# Patient Record
Sex: FEMALE | Race: WHITE | NOT HISPANIC OR LATINO | Employment: FULL TIME | URBAN - METROPOLITAN AREA
[De-identification: names, ages, dates, MRNs, and addresses within clinical notes are randomized per-mention and may not be internally consistent; named-entity substitution may affect disease eponyms.]

---

## 2023-04-18 LAB
ESTRADIOL (PG/ML) IN SER/PLAS: NORMAL
THYROTROPIN (MIU/L) IN SER/PLAS BY DETECTION LIMIT <= 0.05 MIU/L: NORMAL

## 2023-04-24 LAB
ESTRADIOL (PG/ML) IN SER/PLAS: 86 PG/ML
THYROTROPIN (MIU/L) IN SER/PLAS BY DETECTION LIMIT <= 0.05 MIU/L: 1.05 MIU/L (ref 0.44–3.98)

## 2023-05-15 ENCOUNTER — TELEPHONE (OUTPATIENT)
Dept: PRIMARY CARE | Facility: CLINIC | Age: 43
End: 2023-05-15
Payer: COMMERCIAL

## 2023-05-15 DIAGNOSIS — R39.9 UTI SYMPTOMS: Primary | ICD-10-CM

## 2023-05-15 NOTE — TELEPHONE ENCOUNTER
Patient has symptoms of a UTI and would like an order for a urinalysis. Please advise at home number.

## 2023-05-18 ENCOUNTER — LAB (OUTPATIENT)
Dept: LAB | Facility: LAB | Age: 43
End: 2023-05-18
Payer: COMMERCIAL

## 2023-05-18 DIAGNOSIS — R39.9 UTI SYMPTOMS: ICD-10-CM

## 2023-05-18 LAB
APPEARANCE, URINE: ABNORMAL
BACTERIA, URINE: ABNORMAL /HPF
BILIRUBIN, URINE: NEGATIVE
BLOOD, URINE: NEGATIVE
COLOR, URINE: ABNORMAL
GLUCOSE, URINE: NEGATIVE MG/DL
KETONES, URINE: NEGATIVE MG/DL
LEUKOCYTE ESTERASE, URINE: ABNORMAL
NITRITE, URINE: NEGATIVE
PH, URINE: 6 (ref 5–8)
PROTEIN, URINE: NEGATIVE MG/DL
RBC, URINE: 1 /HPF (ref 0–5)
SPECIFIC GRAVITY, URINE: 1 (ref 1–1.03)
SQUAMOUS EPITHELIAL CELLS, URINE: 2 /HPF
UROBILINOGEN, URINE: <2 MG/DL (ref 0–1.9)
WBC, URINE: 12 /HPF (ref 0–5)

## 2023-05-18 PROCEDURE — 87086 URINE CULTURE/COLONY COUNT: CPT

## 2023-05-18 PROCEDURE — 81001 URINALYSIS AUTO W/SCOPE: CPT

## 2023-05-19 LAB — URINE CULTURE: NORMAL

## 2023-05-22 ENCOUNTER — TELEPHONE (OUTPATIENT)
Dept: PRIMARY CARE | Facility: CLINIC | Age: 43
End: 2023-05-22
Payer: COMMERCIAL

## 2023-05-22 DIAGNOSIS — N39.0 URINARY TRACT INFECTION WITHOUT HEMATURIA, SITE UNSPECIFIED: Primary | ICD-10-CM

## 2023-05-22 NOTE — TELEPHONE ENCOUNTER
----- Message from Dougie Hartley DO sent at 5/21/2023  5:33 PM EDT -----  Can you call patient to see if still having UTI symptoms? I can send in an antibiotic if still symptomatic. Urinalysis mildly suggestive of UTI but urine culture has not grown anything.

## 2023-05-22 NOTE — TELEPHONE ENCOUNTER
Pt has questions on her urinary test results with the white blood count and wanted to know if she should be concerned. Please call

## 2023-06-21 PROBLEM — M54.12 CERVICAL RADICULOPATHY, ACUTE: Status: ACTIVE | Noted: 2023-06-21

## 2023-06-21 PROBLEM — R20.2 HAND TINGLING: Status: ACTIVE | Noted: 2023-06-21

## 2023-06-21 PROBLEM — R20.9 SKIN SENSATION DISTURBANCE: Status: ACTIVE | Noted: 2023-03-16

## 2023-06-21 PROBLEM — M54.50 LOW BACK PAIN: Status: ACTIVE | Noted: 2023-06-21

## 2023-06-21 PROBLEM — R20.2 TINGLING OF BOTH FEET: Status: ACTIVE | Noted: 2023-06-21

## 2023-06-21 PROBLEM — K59.89 OTHER SPECIFIED FUNCTIONAL INTESTINAL DISORDERS: Status: ACTIVE | Noted: 2023-03-29

## 2023-06-21 PROBLEM — R20.2 PARESTHESIA: Status: ACTIVE | Noted: 2023-03-29

## 2023-06-21 PROBLEM — M54.2 NECK PAIN: Status: ACTIVE | Noted: 2023-03-16

## 2023-06-21 PROBLEM — R76.8 ANA POSITIVE: Status: ACTIVE | Noted: 2023-03-29

## 2023-06-21 PROBLEM — E03.8 SUBCLINICAL HYPOTHYROIDISM: Status: ACTIVE | Noted: 2023-06-21

## 2023-06-21 PROBLEM — E78.5 HYPERLIPIDEMIA: Status: ACTIVE | Noted: 2023-06-21

## 2023-06-21 PROBLEM — E28.319 EARLY MENOPAUSE: Status: ACTIVE | Noted: 2023-06-21

## 2023-06-21 PROBLEM — E28.39 PREMATURE OVARIAN FAILURE: Status: ACTIVE | Noted: 2023-03-29

## 2023-06-21 PROBLEM — Z98.890 HISTORY OF LOOP ELECTRICAL EXCISION PROCEDURE (LEEP): Status: ACTIVE | Noted: 2017-07-27

## 2023-06-21 PROBLEM — F54 PSYCHOLOGICAL AND BEHAVIORAL FACTORS ASSOCIATED WITH DISORDERS OR DISEASES CLASSIFIED ELSEWHERE: Status: ACTIVE | Noted: 2023-06-21

## 2023-06-21 PROBLEM — R92.8 ABNORMAL MAMMOGRAM: Status: ACTIVE | Noted: 2023-06-21

## 2023-06-21 PROBLEM — F41.9 ANXIETY: Status: ACTIVE | Noted: 2023-06-21

## 2023-06-21 PROBLEM — M54.17 LUMBOSACRAL RADICULITIS: Status: ACTIVE | Noted: 2023-06-21

## 2023-06-21 PROBLEM — R39.15 URINARY URGENCY: Status: ACTIVE | Noted: 2023-06-21

## 2023-06-23 ENCOUNTER — OFFICE VISIT (OUTPATIENT)
Dept: PRIMARY CARE | Facility: CLINIC | Age: 43
End: 2023-06-23
Payer: COMMERCIAL

## 2023-06-23 VITALS
HEART RATE: 98 BPM | BODY MASS INDEX: 26.56 KG/M2 | OXYGEN SATURATION: 96 % | WEIGHT: 155.6 LBS | SYSTOLIC BLOOD PRESSURE: 126 MMHG | DIASTOLIC BLOOD PRESSURE: 82 MMHG | TEMPERATURE: 98.9 F | HEIGHT: 64 IN

## 2023-06-23 DIAGNOSIS — F40.243 ANXIETY WITH FLYING: Primary | ICD-10-CM

## 2023-06-23 PROBLEM — R20.2 TINGLING IN EXTREMITIES: Status: ACTIVE | Noted: 2023-03-29

## 2023-06-23 PROCEDURE — 1036F TOBACCO NON-USER: CPT | Performed by: NURSE PRACTITIONER

## 2023-06-23 PROCEDURE — 99213 OFFICE O/P EST LOW 20 MIN: CPT | Performed by: NURSE PRACTITIONER

## 2023-06-23 RX ORDER — ESTRADIOL 0.1 MG/D
1 FILM, EXTENDED RELEASE TRANSDERMAL 2 TIMES WEEKLY
COMMUNITY
End: 2023-09-08 | Stop reason: ALTCHOICE

## 2023-06-23 RX ORDER — LORAZEPAM 1 MG/1
1 TABLET ORAL AS NEEDED
COMMUNITY
Start: 2022-01-11 | End: 2023-06-23 | Stop reason: ALTCHOICE

## 2023-06-23 RX ORDER — VIT C/E/ZN/COPPR/LUTEIN/ZEAXAN 250MG-90MG
25 CAPSULE ORAL DAILY
COMMUNITY
Start: 2022-04-07

## 2023-06-23 RX ORDER — LEVOTHYROXINE SODIUM 50 UG/1
50 TABLET ORAL DAILY
COMMUNITY
End: 2023-10-11 | Stop reason: SDUPTHER

## 2023-06-23 RX ORDER — PROGESTERONE 200 MG/1
200 CAPSULE ORAL
COMMUNITY

## 2023-06-23 RX ORDER — LORAZEPAM 0.5 MG/1
0.5 TABLET ORAL AS NEEDED
Qty: 6 TABLET | Refills: 0 | Status: SHIPPED | OUTPATIENT
Start: 2023-06-23 | End: 2023-09-01 | Stop reason: SDUPTHER

## 2023-06-23 ASSESSMENT — PATIENT HEALTH QUESTIONNAIRE - PHQ9
SUM OF ALL RESPONSES TO PHQ9 QUESTIONS 1 AND 2: 0
2. FEELING DOWN, DEPRESSED OR HOPELESS: NOT AT ALL
1. LITTLE INTEREST OR PLEASURE IN DOING THINGS: NOT AT ALL

## 2023-06-23 NOTE — PROGRESS NOTES
"Subjective   Patient ID: Jocelyn Dee is a 43 y.o. female who presents for Follow up on medication for flying .    Fear of flying. Has a flight coming up on Monday.  Has used ativan in the past. Usually 1 mg for panic attacks. Has not required in the past year.           Review of Systems  otherwise negative aside from what was mentioned above in HPI.    Objective   /82 (BP Location: Right arm, Patient Position: Sitting, BP Cuff Size: Large adult)   Pulse 98   Temp 37.2 °C (98.9 °F) (Temporal)   Ht 1.626 m (5' 4\")   Wt 70.6 kg (155 lb 9.6 oz)   SpO2 96%   BMI 26.71 kg/m²     Physical Exam  Constitutional:       Appearance: Normal appearance.   Cardiovascular:      Rate and Rhythm: Normal rate.   Pulmonary:      Effort: Pulmonary effort is normal.   Abdominal:      General: Abdomen is flat.   Neurological:      General: No focal deficit present.      Mental Status: She is alert and oriented to person, place, and time. Mental status is at baseline.   Psychiatric:         Mood and Affect: Mood normal.         Behavior: Behavior normal.         Thought Content: Thought content normal.         Judgment: Judgment normal.         Assessment/Plan   Problem List Items Addressed This Visit    None  Visit Diagnoses       Anxiety with flying    -  Primary    Relevant Medications    LORazepam (Ativan) 0.5 mg tablet        Discussed use 30 mins prior to flight.       "

## 2023-07-17 LAB
ABO GROUP (TYPE) IN BLOOD: NORMAL
ANTIBODY SCREEN: NORMAL
RH FACTOR: NORMAL
SYPHILIS TOTAL AB: NONREACTIVE

## 2023-07-18 LAB
CHLAMYDIA TRACH., AMPLIFIED: NEGATIVE
HEPATITIS B VIRUS SURFACE AG PRESENCE IN SERUM: NONREACTIVE
HEPATITIS C VIRUS AB PRESENCE IN SERUM: NONREACTIVE
HIV 1/ 2 AG/AB SCREEN: NONREACTIVE
N. GONORRHEA, AMPLIFIED: NEGATIVE

## 2023-08-25 LAB
ESTRADIOL (PG/ML) IN SER/PLAS: 560 PG/ML
PROGESTERONE (NG/ML) IN SER/PLAS: 0.3 NG/ML

## 2023-08-28 LAB
ESTRADIOL (PG/ML) IN SER/PLAS: 506 PG/ML
PROGESTERONE (NG/ML) IN SER/PLAS: 0.2 NG/ML

## 2023-09-01 ENCOUNTER — TELEPHONE (OUTPATIENT)
Dept: PRIMARY CARE | Facility: CLINIC | Age: 43
End: 2023-09-01
Payer: COMMERCIAL

## 2023-09-01 DIAGNOSIS — F40.243 ANXIETY WITH FLYING: ICD-10-CM

## 2023-09-01 LAB
ESTRADIOL (PG/ML) IN SER/PLAS: 296 PG/ML
PROGESTERONE (NG/ML) IN SER/PLAS: 22.4 NG/ML

## 2023-09-01 RX ORDER — LORAZEPAM 0.5 MG/1
0.5 TABLET ORAL AS NEEDED
Qty: 6 TABLET | Refills: 0 | Status: SHIPPED | OUTPATIENT
Start: 2023-09-01 | End: 2024-05-06 | Stop reason: SDUPTHER

## 2023-09-01 NOTE — TELEPHONE ENCOUNTER
PT SET UP AN APPT ON 9/8/23 WITH DR RIVERA TO SEE HER SINCE THERE WAS AN OPENING. JUST AN FYI TO ADD TO THIS MSG.

## 2023-09-01 NOTE — TELEPHONE ENCOUNTER
PT OF DR RIVERA'S.   ATIVAN 10MG FOR AN UPCOMING FLIGHT FOR HER ANXIETY.  USING [facility] ON [facility] IN [place]   PT FLYING ON 9/12/23.  PLEASE ADVISE PT DOESN'T KNOW IF SHE IS DUE FOR AN APT OR NOT. SHE HAD LAST OFFICE VISIT ON 6/23/23 WITH CHHAYA.

## 2023-09-05 LAB — PROGESTERONE (NG/ML) IN SER/PLAS: 39.1 NG/ML

## 2023-09-08 ENCOUNTER — TELEMEDICINE (OUTPATIENT)
Dept: PRIMARY CARE | Facility: CLINIC | Age: 43
End: 2023-09-08
Payer: COMMERCIAL

## 2023-09-08 ENCOUNTER — TELEPHONE (OUTPATIENT)
Dept: PRIMARY CARE | Facility: CLINIC | Age: 43
End: 2023-09-08

## 2023-09-08 VITALS — WEIGHT: 150 LBS | BODY MASS INDEX: 25.75 KG/M2

## 2023-09-08 DIAGNOSIS — F41.9 ANXIETY: Primary | ICD-10-CM

## 2023-09-08 PROCEDURE — 99212 OFFICE O/P EST SF 10 MIN: CPT | Performed by: INTERNAL MEDICINE

## 2023-09-08 ASSESSMENT — PATIENT HEALTH QUESTIONNAIRE - PHQ9
1. LITTLE INTEREST OR PLEASURE IN DOING THINGS: NOT AT ALL
2. FEELING DOWN, DEPRESSED OR HOPELESS: NOT AT ALL
SUM OF ALL RESPONSES TO PHQ9 QUESTIONS 1 AND 2: 0

## 2023-09-08 NOTE — TELEPHONE ENCOUNTER
Pt of Dr Addison's. She missed her OV today in regards to filling a script for Ativan for her anxiety on flights. She is going to be gone 9/12-9/16 will be back for that short period of time and then gone again 9/19-9/21. Please advise if you would like pt to be added on. Thank you!

## 2023-09-08 NOTE — PROGRESS NOTES
"Subjective   Patient ID: Jocelyn Dee is a 43 y.o. female who presents for Ativan renewal .    Pt had emryo transfer on Tuesday and will be traveling on 9/12 for 1.5 hours,she recently had IVF and asking if she can take her Ativan,she feels nervous with traveling. I told her that she should not take it while pregnant(category D).  She will check with her OB and ask if she can take benadryl,It can also make her sleepy and \"little relaxed\",or do deep breathing and relaxation technics during the flight.         Review of Systems   Genitourinary:         As HPI.   Psychiatric/Behavioral:          As HPI.       Objective   Wt 68 kg (150 lb)   BMI 25.75 kg/m²     Physical Exam    Assessment/Plan   Problem List Items Addressed This Visit       Anxiety - Primary     It is unsafe to take Ativan during pregnancy.  Can take benadryl,but check with your OB.  Return for follow up on general medical condition.             Virtual or Telephone Consent    An interactive audio and video telecommunication system which permits real time communications between the patient (at the originating site) and provider (at the distant site) was utilized to provide this telehealth service.   Verbal consent was requested and obtained from Jocelyn Dee on this date, 09/08/23 for a telehealth visit.    "

## 2023-09-08 NOTE — ASSESSMENT & PLAN NOTE
It is unsafe to take Ativan during pregnancy.  Can take benadryl,but check with your OB.  Return for follow up on general medical condition.

## 2023-09-15 LAB — CHORIOGONADOTROPIN (MIU/ML) IN SER/PLAS: <2 MIU/ML

## 2023-09-26 ENCOUNTER — OFFICE VISIT (OUTPATIENT)
Dept: PRIMARY CARE | Facility: CLINIC | Age: 43
End: 2023-09-26
Payer: COMMERCIAL

## 2023-09-26 VITALS
DIASTOLIC BLOOD PRESSURE: 76 MMHG | HEIGHT: 64 IN | SYSTOLIC BLOOD PRESSURE: 124 MMHG | TEMPERATURE: 97.8 F | BODY MASS INDEX: 26.5 KG/M2 | OXYGEN SATURATION: 97 % | HEART RATE: 101 BPM | WEIGHT: 155.2 LBS

## 2023-09-26 DIAGNOSIS — E78.49 OTHER HYPERLIPIDEMIA: ICD-10-CM

## 2023-09-26 DIAGNOSIS — M54.12 CERVICAL RADICULOPATHY, ACUTE: ICD-10-CM

## 2023-09-26 DIAGNOSIS — F41.9 ANXIETY: Primary | ICD-10-CM

## 2023-09-26 PROCEDURE — 1036F TOBACCO NON-USER: CPT | Performed by: INTERNAL MEDICINE

## 2023-09-26 PROCEDURE — 99214 OFFICE O/P EST MOD 30 MIN: CPT | Performed by: INTERNAL MEDICINE

## 2023-09-26 RX ORDER — IVERMECTIN 10 MG/G
1 CREAM TOPICAL DAILY
COMMUNITY
Start: 2023-09-06

## 2023-09-26 RX ORDER — HYDROXYZINE HYDROCHLORIDE 25 MG/1
25 TABLET, FILM COATED ORAL 2 TIMES DAILY
Qty: 60 TABLET | Refills: 0 | Status: SHIPPED | OUTPATIENT
Start: 2023-09-26 | End: 2023-10-25 | Stop reason: SDUPTHER

## 2023-09-26 RX ORDER — NORGESTIMATE AND ETHINYL ESTRADIOL 0.25-0.035
1 KIT ORAL DAILY
COMMUNITY
Start: 2023-09-16 | End: 2023-11-08 | Stop reason: ALTCHOICE

## 2023-09-26 ASSESSMENT — PATIENT HEALTH QUESTIONNAIRE - PHQ9
1. LITTLE INTEREST OR PLEASURE IN DOING THINGS: NOT AT ALL
2. FEELING DOWN, DEPRESSED OR HOPELESS: SEVERAL DAYS
10. IF YOU CHECKED OFF ANY PROBLEMS, HOW DIFFICULT HAVE THESE PROBLEMS MADE IT FOR YOU TO DO YOUR WORK, TAKE CARE OF THINGS AT HOME, OR GET ALONG WITH OTHER PEOPLE: NOT DIFFICULT AT ALL
SUM OF ALL RESPONSES TO PHQ9 QUESTIONS 1 AND 2: 1

## 2023-09-26 NOTE — PROGRESS NOTES
"Subjective   Patient ID: Jocelyn Dee is a 43 y.o. female who presents for Follow-up.    Here to follow up on tingling legs,was seen by neurologist Dr Virgen and , Dr Dong and another one at [facility] and an I.D,had work up.records reviewed.  Pt has been seen at fertility clinic and has been feeling overwhelmed,has an embryo,has spent lots of money but unsure if she is ready now to attempt to get pregnant.  Has been anxious,was started on OC,has been very emotional.  She has been working at [employer],working 4 hours per day because unable to stand up for 8 hours.  She c/o neck pain,tingling of left arm and left sciatica.she will be going for PT and massage therapy.         Review of Systems   Neurological:         Tingling left arm and legs.       Objective   /76 (BP Location: Left arm, Patient Position: Sitting, BP Cuff Size: Adult)   Pulse 101   Temp 36.6 °C (97.8 °F) (Temporal)   Ht 1.626 m (5' 4\")   Wt 70.4 kg (155 lb 3.2 oz)   SpO2 97%   BMI 26.64 kg/m²     Physical Exam  Constitutional:       Appearance: Normal appearance.   HENT:      Head: Normocephalic and atraumatic.   Eyes:      Extraocular Movements: Extraocular movements intact.      Pupils: Pupils are equal, round, and reactive to light.   Cardiovascular:      Rate and Rhythm: Normal rate and regular rhythm.      Heart sounds: Normal heart sounds.   Pulmonary:      Effort: Pulmonary effort is normal.      Breath sounds: Normal breath sounds. No wheezing or rhonchi.   Abdominal:      General: Abdomen is flat. Bowel sounds are normal. There is no distension.      Palpations: Abdomen is soft.   Musculoskeletal:         General: Normal range of motion.      Cervical back: Normal range of motion and neck supple.      Right lower leg: No edema.      Left lower leg: No edema.   Skin:     General: Skin is warm.   Neurological:      General: No focal deficit present.      Mental Status: She is alert and oriented to person, place, " and time.   Psychiatric:         Mood and Affect: Mood normal.      Comments: Tearful at time.         Assessment/Plan   Problem List Items Addressed This Visit             ICD-10-CM    Anxiety - Primary F41.9    Relevant Medications    hydrOXYzine HCL (Atarax) 25 mg tablet    Cervical radiculopathy, acute M54.12     See PT.         Hyperlipidemia E78.5     Follow low fat diet.

## 2023-10-04 ENCOUNTER — TELEPHONE (OUTPATIENT)
Dept: ENDOCRINOLOGY | Facility: CLINIC | Age: 43
End: 2023-10-04
Payer: COMMERCIAL

## 2023-10-04 DIAGNOSIS — Z01.812 ENCOUNTER FOR PREPROCEDURAL LABORATORY EXAMINATION: ICD-10-CM

## 2023-10-04 DIAGNOSIS — N84.0 ENDOMETRIAL POLYP: Primary | ICD-10-CM

## 2023-10-04 NOTE — TELEPHONE ENCOUNTER
Called patient to review recent hysteroscopy findings  Reviewed with Dr. Chambers and we agreed she should proceed with hysteroscopy with Aveta biopsy vs. Endometrial biopsy with pipelle pending findings  She would like to do this awake to avoid anesthesia  She will stay on OCP until procedure; will have procedure team contact patient for scheduling.  SAMANTA HILLIARD on 10/4/23 at 12:45 PM.     Prior notes reviewed:    Procedure Note Dr. Chambers 9/29/2023  Findings:   Normally shaped cavity with diffusely irregular endometrial lining, no discrete lesions or adhesions  Bilateral normal appearing ostia seen    Patient and images reviewed with Dr. Hilliard, recommend endometrial biopsy with Aveta hysteroscopy/focal sampling. Dr. Hilliard will reach out to these patient regarding findings.    9/28/2023  Called patient to review possible endometrioma seen on US  She does endorse significant dysmenorrhea and nausea    Discussed options for testing and treatment for possible endometriosis  DIscussed laparoscopy, BCL6 biopsy as testing options  Laparoscopy, stopping HRT or adding Lupron/.Aygestin as treatment options    As patient already has POI and has been on HRT, Lupron is likely not necessary for endometriosis suppression  Recommend presumptive treatment with Aygestin 5 mg daily x 1 month  After Aygestin suppression can proceed with another programmed FET    If still no success can consider other diagnostic/treatment options for possible endometriosis.  Samanta Hilliard MD 09/28/2023 16:32     9/19/2023  Called patient following negative hCG in donor egg FET    She did have spotting during the cycle, resolved with P4    Recommend:  1. She is planning to start OCPs, okay to proceed with menses, schedule diagnostic hysteroscopy while on OCPs  2. For next cycle: Programmed FET with estrace patches and IM P4 100 mg (2mL) + vaginal progesterone 200 mg BID - to start morning after IM P4 start  Baby ASA to start with  FET  -transfer 1 blast

## 2023-10-05 ENCOUNTER — PREP FOR PROCEDURE (OUTPATIENT)
Dept: ENDOCRINOLOGY | Facility: CLINIC | Age: 43
End: 2023-10-05
Payer: COMMERCIAL

## 2023-10-05 ENCOUNTER — DOCUMENTATION (OUTPATIENT)
Dept: ENDOCRINOLOGY | Facility: CLINIC | Age: 43
End: 2023-10-05
Payer: COMMERCIAL

## 2023-10-05 DIAGNOSIS — N93.9 ABNORMAL UTERINE BLEEDING: Primary | ICD-10-CM

## 2023-10-05 RX ORDER — KETOROLAC TROMETHAMINE 30 MG/ML
30 INJECTION, SOLUTION INTRAMUSCULAR; INTRAVENOUS ONCE AS NEEDED
Status: CANCELLED | OUTPATIENT
Start: 2023-10-05 | End: 2023-10-10

## 2023-10-05 RX ORDER — ACETAMINOPHEN 325 MG/1
650 TABLET ORAL ONCE AS NEEDED
Status: CANCELLED | OUTPATIENT
Start: 2023-10-05

## 2023-10-06 ENCOUNTER — HOSPITAL ENCOUNTER (OUTPATIENT)
Dept: ENDOCRINOLOGY | Facility: CLINIC | Age: 43
End: 2023-10-06
Payer: COMMERCIAL

## 2023-10-06 ENCOUNTER — APPOINTMENT (OUTPATIENT)
Dept: ENDOCRINOLOGY | Facility: CLINIC | Age: 43
End: 2023-10-06
Payer: COMMERCIAL

## 2023-10-06 VITALS
WEIGHT: 156.09 LBS | RESPIRATION RATE: 17 BRPM | SYSTOLIC BLOOD PRESSURE: 135 MMHG | DIASTOLIC BLOOD PRESSURE: 89 MMHG | HEART RATE: 100 BPM | HEIGHT: 64 IN | OXYGEN SATURATION: 98 % | BODY MASS INDEX: 26.65 KG/M2 | TEMPERATURE: 97.5 F

## 2023-10-06 DIAGNOSIS — Z31.41 FERTILITY TESTING: Primary | ICD-10-CM

## 2023-10-06 DIAGNOSIS — N93.9 ABNORMAL UTERINE BLEEDING: ICD-10-CM

## 2023-10-06 LAB — PREGNANCY TEST URINE, POC: NEGATIVE

## 2023-10-06 PROCEDURE — 88305 TISSUE EXAM BY PATHOLOGIST: CPT | Performed by: PATHOLOGY

## 2023-10-06 PROCEDURE — 88305 TISSUE EXAM BY PATHOLOGIST: CPT | Mod: TC,SUR,CMCLAB | Performed by: STUDENT IN AN ORGANIZED HEALTH CARE EDUCATION/TRAINING PROGRAM

## 2023-10-06 PROCEDURE — 81025 URINE PREGNANCY TEST: CPT | Performed by: STUDENT IN AN ORGANIZED HEALTH CARE EDUCATION/TRAINING PROGRAM

## 2023-10-06 RX ORDER — KETOROLAC TROMETHAMINE 30 MG/ML
30 INJECTION, SOLUTION INTRAMUSCULAR; INTRAVENOUS ONCE AS NEEDED
Status: DISCONTINUED | OUTPATIENT
Start: 2023-10-06 | End: 2023-10-08 | Stop reason: HOSPADM

## 2023-10-06 RX ORDER — ACETAMINOPHEN 325 MG/1
650 TABLET ORAL ONCE AS NEEDED
Status: DISCONTINUED | OUTPATIENT
Start: 2023-10-06 | End: 2023-10-08 | Stop reason: HOSPADM

## 2023-10-06 ASSESSMENT — PAIN - FUNCTIONAL ASSESSMENT
PAIN_FUNCTIONAL_ASSESSMENT: 0-10
PAIN_FUNCTIONAL_ASSESSMENT: 0-10

## 2023-10-06 ASSESSMENT — PAIN SCALES - GENERAL
PAINLEVEL_OUTOF10: 0 - NO PAIN
PAINLEVEL_OUTOF10: 1

## 2023-10-06 NOTE — PROGRESS NOTES
Patient ID: Jocelyn Dee is a 43 y.o. female.    Hysteroscopy dilation and curettage    Date/Time: 10/6/2023 3:19 PM    Performed by: Adriana Johnson MD  Authorized by: Adriana Johnson MD    Consent:     Consent obtained:  Verbal and written    Consent given by:  Patient    Risks, benefits, and alternatives were discussed: yes      Risks discussed:  Bleeding, infection and pain  Universal protocol:     Procedure explained and questions answered to patient or proxy's satisfaction: yes      Relevant documents present and verified: yes      Test results available: yes      Imaging studies available: yes      Required blood products, implants, devices, and special equipment available: yes      Immediately prior to procedure, a time out was called: yes      Patient identity confirmed:  Verbally with patient, arm band and hospital-assigned identification number  Pre-procedure details:     Skin preparation:  Povidone-iodine  Sedation:     Sedation type:  None  Anesthesia:     Anesthesia method:  Local infiltration    Local anesthetic:  Lidocaine 1% w/o epi  Procedure specific details:      Procedure: Hysteroscopic Dilation and Curettage  Preop diagnosis: Fertility testing  Post op diagnosis: Same, sp hysteroscopic dilation and curettage  Attending: MD Trish  Assistant: Fellow    Anesthesia: None   IV: None   EBL: 3 cc  Specimens: None   Complications: None   Risks benefits and alternatives of the procedure explained to the patient and informed consent was obtained. Urine pregnancy test was performed and was negative. Time out was performed. The patient was placed in the dorsal lithotomy position and a sterile speculum was placed in the vagina. The cervix was sterilized with Betadine x3. Paracervical block with lidocaine 1% was administered.   Tenaculum: at 12 o'clock  Dilation: No  The hysteroscope was placed in the cervix and advanced into the uterine cavity. Normal saline was used for distension media. The Luis Eduardoeta  was placed in the uterus. The resector was used to remove the irregular proliferative endometrium. Images were obtained and findings noted as below.   All instruments were then removed. Good hemostasis was noted. Patient tolerated the procedure well returned to the recovery area in stable condition. .   Findings:   Cavity: irregular endometrium, now resected  Ostia: Bilateral tubal ostia visualized  Additional Notes: n/a    Adriana Johnson MD PGY 5  Reproductive Endocrinology and Infertility Fellow                Post-procedure details:     Procedure completion:  Tolerated well, no immediate complications

## 2023-10-09 ENCOUNTER — PHARMACY VISIT (OUTPATIENT)
Dept: PHARMACY | Facility: CLINIC | Age: 43
End: 2023-10-09
Payer: COMMERCIAL

## 2023-10-09 ENCOUNTER — TELEPHONE (OUTPATIENT)
Dept: ENDOCRINOLOGY | Facility: CLINIC | Age: 43
End: 2023-10-09
Payer: COMMERCIAL

## 2023-10-09 NOTE — PROGRESS NOTES
Phone call to patient to schedule hysteroscopic ploypectomy with anesthesia on 10/6/23 at 1030.     Instructed to arrive 15 minutes early with a full bladder, take ibuprofen or tylenol up to hour before procedure.     Questions and concerns addressed.

## 2023-10-09 NOTE — TELEPHONE ENCOUNTER
Returned call to patient regarding OCPs, patient will be on her last OCP tomorrow, plan was to start aygestin x1 month, patient was not sure if she should just switch to aygestin or stop her pills, get a period, and start aygestin with that period. Pt had biopsy done 10/6 and awaiting results. Will confirm with Dr. Chong and let patient know plan once I hear back.    10/09/23 at 5:13 PM - Catie Melchor RN

## 2023-10-09 NOTE — TELEPHONE ENCOUNTER
Patient would like a call back - she's about to run out of birth control and isn't sure if she should start a new pack, get her period, or switch to a different medication

## 2023-10-10 ENCOUNTER — TELEPHONE (OUTPATIENT)
Dept: ENDOCRINOLOGY | Facility: CLINIC | Age: 43
End: 2023-10-10
Payer: COMMERCIAL

## 2023-10-10 NOTE — TELEPHONE ENCOUNTER
Patient returned call, told patient Dr. Chong would like her to stop her birth control, get a period, then start Aygestin. Pt agreeable with plan, will touch base with us towards the end of the 30 days to discuss FET set up. Patient aware last med start date for FET is 11/20 prior to the temporary lab closure in December.    Per Dr. Chong, ok to go directly into FET from Aygestin suppression, will not need to get menses. Pending biopsy results may need to add in other medication if needed.    10/10/23 at 10:21 AM - Catie Melchor RN

## 2023-10-11 DIAGNOSIS — E03.8 SUBCLINICAL HYPOTHYROIDISM: Primary | ICD-10-CM

## 2023-10-11 RX ORDER — LEVOTHYROXINE SODIUM 50 UG/1
50 TABLET ORAL DAILY
Qty: 30 TABLET | Refills: 2 | Status: SHIPPED | OUTPATIENT
Start: 2023-10-11 | End: 2024-01-18 | Stop reason: SDUPTHER

## 2023-10-11 NOTE — TELEPHONE ENCOUNTER
Returned call to patient, needs refill of levothyroxine 50mcg, placed new order to be signed off by provider. Instructed to call if she does not get it by the weekend.    10/11/23 at 11:01 AM - Catie Melchor RN

## 2023-10-13 ENCOUNTER — TELEPHONE (OUTPATIENT)
Dept: ENDOCRINOLOGY | Facility: CLINIC | Age: 43
End: 2023-10-13
Payer: COMMERCIAL

## 2023-10-13 NOTE — TELEPHONE ENCOUNTER
Returned patient's phone call. Patient stated she received a message from the pharmacy stating her Synthroid will not be ready for  until 10/16 and she leaves to go OOT 10/15. Patient advised to call pharmacy and see if they are able to have it ready earlier, or if there is another pharmacy we can send Rx to so patient can have medications on her trip. Patient will call back to confirm.    PEPE LLAMAS on 10/13/23 at 2:10 PM.

## 2023-10-13 NOTE — TELEPHONE ENCOUNTER
Patient would like a call back - Patient's pharmacy won't have her meds ready until 10/16 and she goes out of town on 10/15

## 2023-10-16 NOTE — TELEPHONE ENCOUNTER
Called patient back. Patient notifying team that she will miss one dose of synthroid because she is traveling. Patient also wants to know if her results are back from her biopsy. Patient had hysteroscopic D&C with biopsy done on 10/6 and she also wants to know if she can submerge in water and resume normal activities 10 days post hysteroscopy. Once results are back will forward chart to patient's provider to discuss results.   SATISH PRICE on 10/16/23 at 4:49 PM.      Called patient back because results are still not back. Let patient know that she should get a call once results are in and if she doesn't hear anything by next week she should call back.   SATISH PRICE on 10/18/23 at 4:31 PM.

## 2023-10-19 LAB
LABORATORY COMMENT REPORT: NORMAL
PATH REPORT.FINAL DX SPEC: NORMAL
PATH REPORT.GROSS SPEC: NORMAL
PATH REPORT.RELEVANT HX SPEC: NORMAL
PATH REPORT.TOTAL CANCER: NORMAL

## 2023-10-23 ENCOUNTER — TELEPHONE (OUTPATIENT)
Dept: ENDOCRINOLOGY | Facility: CLINIC | Age: 43
End: 2023-10-23
Payer: COMMERCIAL

## 2023-10-23 NOTE — TELEPHONE ENCOUNTER
Patient is calling to talk to a nurse about her test result   She also wants to check if she can swim  Please call her

## 2023-10-23 NOTE — TELEPHONE ENCOUNTER
Returned call to patient, patient was hoping to discuss results of biopsy with Dr. Chong, briefly spoke with Dr. Johnson last week regarding results since Dr. Chong was out of the office. Patient also asking if she can swim now since she does that for exercise. Biopsy was 10/6- told patient she is ok to swim (pool). Patient requesting call from Dr. Chong to review when she has a few minutes, will send her a message to call patient. Patient currently on Aygestin (will be for 30 days prior t next FET set up).    10/23/23 at 11:36 AM - Catie Melchor RN

## 2023-10-25 DIAGNOSIS — F41.9 ANXIETY: ICD-10-CM

## 2023-10-25 RX ORDER — NORGESTIMATE AND ETHINYL ESTRADIOL 0.25-0.035
1 KIT ORAL DAILY
Qty: 84 TABLET | OUTPATIENT
Start: 2023-10-25

## 2023-10-25 RX ORDER — HYDROXYZINE HYDROCHLORIDE 25 MG/1
25 TABLET, FILM COATED ORAL 2 TIMES DAILY
Qty: 60 TABLET | Refills: 0 | Status: SHIPPED | OUTPATIENT
Start: 2023-10-25 | End: 2023-12-06 | Stop reason: SDUPTHER

## 2023-10-25 NOTE — TELEPHONE ENCOUNTER
Called patient to review recent hysteroscopy/endometrial biopsy results  Normal secretory endometrium, resection of endometrium performed.    Patient currently on Aygestin- will stay on this until FET set up    Plan for next FET:  Recommend:  Stop Aygestin when ready to proceed, do not need to wait for menses to start estrace patches  2. For next cycle: Programmed FET with estrace patches and IM P4 100 mg (2mL) + vaginal progesterone 200 mg BID - to start morning after IM P4 start  Baby ASA to start with FET  -transfer 1 SAMANTA Amos on 10/25/23 at 2:37 PM.

## 2023-10-25 NOTE — TELEPHONE ENCOUNTER
----- Message from Catie Melchor RN sent at 10/23/2023 11:30 AM EDT -----  Regarding: next steps  Hi Jocelyn Conrad knew you were out last week, she briefly spoke to Dr. Johnson while you were gone regarding her results but she was hoping to just chat with you real quick to discuss and what your thoughts were. She is currently on the Aygestin for 1 month prior to another FET, you  mentioned possibly adding something in pending these results. She was just requested a quick call from you when you have a few minutes.    Thank you

## 2023-10-26 ENCOUNTER — DOCUMENTATION (OUTPATIENT)
Dept: ENDOCRINOLOGY | Facility: CLINIC | Age: 43
End: 2023-10-26
Payer: COMMERCIAL

## 2023-10-26 NOTE — PROGRESS NOTES
Called patient to discuss timing of FET after talking to Dr. Chong, wanting to confirm when patient started Aygestin (Dr. Chong wanted at least 1 month), left voicemail letting patient know we would need to start FET meds by 11/20 in order to get in before the end of the year. Told patient to call back tomorrow or next week to touch base.    10/26/23 at 4:55 PM - Catie Melchor RN

## 2023-10-27 ENCOUNTER — TELEPHONE (OUTPATIENT)
Dept: ENDOCRINOLOGY | Facility: CLINIC | Age: 43
End: 2023-10-27
Payer: COMMERCIAL

## 2023-10-27 NOTE — TELEPHONE ENCOUNTER
Returned patient's phone call. Patient started her Aygestin on 10/14, and is aware she needs to be on for at least one month before FET cycle. Patient is going to call for Nica on 11/15 to discuss FET set up and dates, and when to switch over to Estrace patches. Patient is aware last date for FET med starts is on 11/20.    PEPE LLAMAS on 10/27/23 at 4:45 PM.

## 2023-10-31 NOTE — PROGRESS NOTES
[place] Interval FET Checklist    Age: 43 y.o.    Provider: Jaymie Chong MD  Primary RN: Catie Melchor  Reasons for Treatment: POI  Last BMI  10/06/23 : 26.79 kg/m²       Past Medical History:   Diagnosis Date    Papillomavirus as the cause of diseases classified elsewhere     HPV in female    Personal history of other complications of pregnancy, childbirth and the puerperium     History of spontaneous     Personal history of other diseases of the female genital tract     History of infertility    Personal history of other infectious and parasitic diseases     History of infection due to human papilloma virus (HPV)     Ectopic Risk: N    Date Done Consultation Results/Comments   10/25/23 Medication Protocol Programmed FET with estrace patches and IM P4 100 mg (2mL) + vaginal progesterone 200 mg BID - to start morning after IM P4 start  Baby ASA to start with FET   23 FET Treatment Form Enroll in EngagedMD: Yes (Catie Melchor RN)  Received and in chart: Yes (Catie Melchor RN)   23 IVF Consent Form Enroll in EngagedMD: Yes (Catie Melchor RN)  Received and in chart: Yes (Catie Melchor RN)     [place] (in) Form Enroll in EngagedMD: Yes (Catie Melchor RN)  Received and in chart: n/a-  Storage at this time    [employer] Transfer Authorization Form Enroll in EngagedMD: Yes (Catie Melchor RN)  Received and in chart: n/a- old consents    Embryo Ship In N/A    Insurance Coverage: NO   7/15/22 Financial Consult Yes    Genetic Carrier Screening Clearance Yes   N/A MFM Consult Okay to proceed? N/A   N/A Psych Consult Okay to proceed? Yes- when created embryos   N/A Genetics Consult Okay to proceed? N/A    Other    Date Done Female Labs Results/Comments   2023 T&S (Q 1 Year) ABO: A  Rh: POS  Antibody: NEG   2023 Hep B sAg NONREACTIVE   2023 Hep C AB NONREACTIVE   2023 HIV NONREACTIVE   2023 Syphilis NONREACTIVE   2023 GC/CT GC: NEGATIVE  CT: NEGATIVE    7/21/2022 Rubella (Q 5 Years) Equivocal  Booster date: 9/16/22 7/21/2022 Varicella (Q 5 Years) POSITIVE   4/24/2023 TSH 1.05 (Ref range: 0.44 - 3.98 mIU/L)   No results found for requested labs within last 365 days. HgbA1C No results found for requested labs within last 365 days.   10/6/23 Uterine Cavity Eval HSG: N/A    SIS: N/A    Hyster: (10/6/2023) Procedure: Hysteroscopic Dilation and Curettage  Preop diagnosis: Fertility testing  Post op diagnosis: Same, sp hysteroscopic dilation and curettage  Attending: MD Trish  Assistant: Fellow    Anesthesia: None   IV: None   EBL: 3 cc  Specimens: None   Complications: None   Risks benefits and alternatives of the procedure explained to the patient and informed consent was obtained. Urine pregnancy test was performed and was negative. Time out was performed. The patient was placed in the dorsal lithotomy position and a sterile speculum was placed in the vagina. The cervix was sterilized with Betadine x3. Paracervical block with lidocaine 1% was administered.   Tenaculum: at 12 o'clock  Dilation: No  The hysteroscope was placed in the cervix and advanced into the uterine cavity. Normal saline was used for distension media. The Aveta was placed in the uterus. The resector was used to remove the irregular proliferative endometrium. Images were obtained and findings noted as below.   All instruments were then removed. Good hemostasis was noted. Patient tolerated the procedure well returned to the recovery area in stable condition. .   Findings:   Cavity: irregular endometrium, now resected  Ostia: Bilateral tubal ostia visualized  Additional Notes: n/a    Adriana Johnson MD PGY 5  Reproductive Endocrinology and Infertility Fellow              Biopsy results:  Normal secretory endometrium, resection of endometrium performed   6/2021 Pap Smear (Q 5 Years) NEGATIVE FOR INTRAEPITHELIAL LESION OR MALIGNANCY   12/2022 Mammogram ( > 40) (Q 1 Year) NORMAL- repeat 1 yr   Date  Done Miscellaneous Results/Comments    BMI Checklist  BMI > 40 or < 18 Added to chart:   No    >= 45 Checklist  Added to chart:   No   **Does not need to be completed prior to placing on IVF calendar**

## 2023-11-08 ENCOUNTER — OFFICE VISIT (OUTPATIENT)
Dept: PRIMARY CARE | Facility: CLINIC | Age: 43
End: 2023-11-08
Payer: COMMERCIAL

## 2023-11-08 VITALS
TEMPERATURE: 97.7 F | OXYGEN SATURATION: 99 % | SYSTOLIC BLOOD PRESSURE: 127 MMHG | WEIGHT: 158 LBS | HEART RATE: 93 BPM | DIASTOLIC BLOOD PRESSURE: 89 MMHG | BODY MASS INDEX: 27.12 KG/M2

## 2023-11-08 DIAGNOSIS — R05.1 ACUTE COUGH: ICD-10-CM

## 2023-11-08 DIAGNOSIS — J22 ACUTE RESPIRATORY INFECTION: Primary | ICD-10-CM

## 2023-11-08 PROBLEM — R10.2 ACUTE PAIN IN FEMALE PELVIS: Status: ACTIVE | Noted: 2023-11-08

## 2023-11-08 PROBLEM — N97.9 FEMALE INFERTILITY: Status: ACTIVE | Noted: 2023-11-08

## 2023-11-08 PROBLEM — M47.812 CERVICAL SPONDYLOSIS WITHOUT MYELOPATHY: Status: ACTIVE | Noted: 2023-10-03

## 2023-11-08 PROBLEM — N94.6 DYSMENORRHEA: Status: ACTIVE | Noted: 2023-11-08

## 2023-11-08 PROBLEM — M47.817 LUMBOSACRAL SPONDYLOSIS WITHOUT MYELOPATHY: Status: ACTIVE | Noted: 2023-10-03

## 2023-11-08 PROBLEM — N83.8 DIMINISHED OVARIAN RESERVE DUE TO ADVANCED MATERNAL AGE: Status: ACTIVE | Noted: 2023-11-08

## 2023-11-08 PROCEDURE — 87637 SARSCOV2&INF A&B&RSV AMP PRB: CPT

## 2023-11-08 PROCEDURE — 99213 OFFICE O/P EST LOW 20 MIN: CPT | Performed by: INTERNAL MEDICINE

## 2023-11-08 PROCEDURE — 1036F TOBACCO NON-USER: CPT | Performed by: INTERNAL MEDICINE

## 2023-11-08 RX ORDER — DOXYCYCLINE 100 MG/1
100 CAPSULE ORAL 2 TIMES DAILY
Qty: 14 CAPSULE | Refills: 0 | Status: SHIPPED | OUTPATIENT
Start: 2023-11-08 | End: 2023-11-15

## 2023-11-08 RX ORDER — NORETHINDRONE 5 MG/1
5 TABLET ORAL DAILY
COMMUNITY
End: 2024-03-13 | Stop reason: ALTCHOICE

## 2023-11-08 RX ORDER — BENZONATATE 200 MG/1
200 CAPSULE ORAL 3 TIMES DAILY PRN
Qty: 42 CAPSULE | Refills: 0 | Status: SHIPPED | OUTPATIENT
Start: 2023-11-08 | End: 2023-12-08

## 2023-11-08 RX ORDER — FLUTICASONE PROPIONATE 50 MCG
1 SPRAY, SUSPENSION (ML) NASAL DAILY
Qty: 16 G | Refills: 5 | Status: SHIPPED | OUTPATIENT
Start: 2023-11-08 | End: 2024-06-10 | Stop reason: WASHOUT

## 2023-11-08 ASSESSMENT — PATIENT HEALTH QUESTIONNAIRE - PHQ9
2. FEELING DOWN, DEPRESSED OR HOPELESS: NOT AT ALL
1. LITTLE INTEREST OR PLEASURE IN DOING THINGS: NOT AT ALL
SUM OF ALL RESPONSES TO PHQ9 QUESTIONS 1 AND 2: 0

## 2023-11-08 NOTE — PROGRESS NOTES
Subjective   Patient ID: Jocelyn Dee is a 43 y.o. female who presents for URI, COVID negative , Headache, Fever, Low grade fever , Sore Throat, Cough, Green mucus , and Symptoms started Sunday morning .    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         Review of Systems   Constitutional:  Positive for fatigue and fever.   HENT:  Positive for congestion, ear pain, sinus pressure and sore throat.    Eyes:  Negative for discharge and redness.   Respiratory:  Positive for cough. Negative for shortness of breath.    Cardiovascular:  Negative for chest pain.   Gastrointestinal:  Negative for nausea and vomiting.   Neurological:  Positive for headaches.       Objective   /89 (BP Location: Left arm, Patient Position: Sitting, BP Cuff Size: Adult)   Pulse 93   Temp 36.5 °C (97.7 °F) (Temporal)   Wt 71.7 kg (158 lb)   SpO2 99%   BMI 27.12 kg/m²     Physical Exam  Constitutional:       General: She is not in acute distress.     Appearance: Normal appearance.   HENT:      Head: Normocephalic and atraumatic.      Right Ear: Tympanic membrane normal.      Left Ear: Tympanic membrane normal.      Ears:      Comments: Pain over maxillary sinus area with palpation.     Nose: Congestion present.      Mouth/Throat:      Pharynx: Posterior oropharyngeal erythema present. No oropharyngeal exudate.   Eyes:      General: No scleral icterus.     Extraocular Movements: Extraocular movements intact.      Pupils: Pupils are equal, round, and reactive to light.   Cardiovascular:      Rate and Rhythm: Normal rate and regular rhythm.      Heart sounds: Normal heart sounds.   Pulmonary:      Effort: Pulmonary effort is normal.      Breath  sounds: Normal breath sounds. No wheezing or rhonchi.   Abdominal:      General: Abdomen is flat. Bowel sounds are normal. There is no distension.      Palpations: Abdomen is soft.   Musculoskeletal:         General: Normal range of motion.      Cervical back: Normal range of motion and neck supple.      Right lower leg: No edema.      Left lower leg: No edema.   Skin:     General: Skin is warm.   Neurological:      General: No focal deficit present.      Mental Status: She is alert and oriented to person, place, and time.   Psychiatric:         Mood and Affect: Mood normal.         Behavior: Behavior normal.         Assessment/Plan   Problem List Items Addressed This Visit             ICD-10-CM    Acute respiratory infection - Primary J22     Suspect acute sinusitis but we will check for COVID RSV and flu swab was done today.  Rapid strep was negative.  Will treat with Docycycline,Flonase..Benzonates(Pt not pregnant).  Rest,push fluid.  Call in 4 days if not better         Relevant Medications    fluticasone (Flonase) 50 mcg/actuation nasal spray    doxycycline (Vibramycin) 100 mg capsule    Other Relevant Orders    Sars-CoV-2 PCR, Symptomatic (Completed)    RSV PCR (Completed)    Influenza A, and B PCR (Completed)    Acute cough R05.1    Relevant Medications    benzonatate (Tessalon) 200 mg capsule

## 2023-11-09 LAB
FLUAV RNA RESP QL NAA+PROBE: NOT DETECTED
FLUBV RNA RESP QL NAA+PROBE: NOT DETECTED
RSV RNA RESP QL NAA+PROBE: NOT DETECTED
SARS-COV-2 RNA RESP QL NAA+PROBE: NOT DETECTED

## 2023-11-09 ASSESSMENT — ENCOUNTER SYMPTOMS
FATIGUE: 1
EYE REDNESS: 0
SINUS PRESSURE: 1
SHORTNESS OF BREATH: 0
VOMITING: 0
NAUSEA: 0
HEADACHES: 1
COUGH: 1
FEVER: 1
EYE DISCHARGE: 0
SORE THROAT: 1

## 2023-11-09 NOTE — ASSESSMENT & PLAN NOTE
Suspect acute sinusitis but we will check for COVID RSV and flu swab was done today.  Rapid strep was negative.  Will treat with Docycycline,Flonase..Benzonates(Pt not pregnant).  Rest,push fluid.  Call in 4 days if not better

## 2023-11-14 ENCOUNTER — TELEPHONE (OUTPATIENT)
Dept: ENDOCRINOLOGY | Facility: CLINIC | Age: 43
End: 2023-11-14
Payer: COMMERCIAL

## 2023-11-14 NOTE — TELEPHONE ENCOUNTER
Returned patient's phone call. Patient was calling to discuss next steps for IVF cycle with Nica. Told patient Nica will be back in the office tomorrow. Will send RN a message, and patient will call tomorrow to review next steps.    PEPE LLAMAS on 11/14/23 at 12:57 PM.

## 2023-11-15 ENCOUNTER — DOCUMENTATION (OUTPATIENT)
Dept: ENDOCRINOLOGY | Facility: CLINIC | Age: 43
End: 2023-11-15
Payer: COMMERCIAL

## 2023-11-15 NOTE — PROGRESS NOTES
Called patient to discuss cycle after calling the office the other day. Patient currently on Aygestin for about a month now, per Dr. Chong, plan was to go directly into FET set up from last pill (does not need to wait for menses). Patient unsure at this time if she wants to proceed prior to the last start date 11/20, states she has a lot going on with work and getting over being sick and has some stressors going on. Told patient that she can continue this medication and can be on longer than a month if desired. Patient is going to discuss with  and let us know.    Discussed potential dates for FET set up.  If patient wants to proceed prior to  lab closure can stop aygestin 11/19 and start patches 11/20, will plan for FET on 12/13 with Dr. Chong. If patient wants to wait until January for transfer, could start Estrace patches 12/11-12/13 and plan for FET 1/2 or 1/4 with Dr. Chong.  Patient states the Aygestin has been helping with her bowel movements (wondering if she does have endometriosis, told patient it is possible it could be helping with those symptoms).    Patient aware she needs repeat mammogram in December when she is due, if she delays would need to get updated for a January cycle. Pt agreeable. Also aware if she waits until February when she is 44 there is additional testing she would need to complete.    11/15/23 at 11:41 AM - Catie Melchor RN

## 2023-11-20 ENCOUNTER — TELEPHONE (OUTPATIENT)
Dept: ENDOCRINOLOGY | Facility: CLINIC | Age: 43
End: 2023-11-20
Payer: COMMERCIAL

## 2023-11-20 DIAGNOSIS — Z01.812 ENCOUNTER FOR PREPROCEDURAL LABORATORY EXAMINATION: ICD-10-CM

## 2023-11-20 DIAGNOSIS — Z31.83 ENCOUNTER FOR ASSISTED REPRODUCTIVE FERTILITY CYCLE: ICD-10-CM

## 2023-11-20 DIAGNOSIS — N97.9 FEMALE INFERTILITY: ICD-10-CM

## 2023-11-20 NOTE — TELEPHONE ENCOUNTER
Returned call to patient- states she is going to hold off on her transfer until January, will plan to continue Aygestin for now and plan to take last pill 12/12 and start patches 12/13. Plan for FET on 1/4 with Dr. Chong. Can plan lining check 12/26 or 12/27.   Patient will call to touch base with us on 12/12 as a reminder that she is taking her last pill.  Patient is going to send us the medication her rheumatologist may want her to start on (safe in pregnancy but wanted to double check it with Dr. Chong, patient cannot remember the name, will send via [email or web address] and can send to Dr. Chong), states her provider is thinking she may  be having some autoimmune responses.    Patient will need to update mammogram in December.    11/20/23 at 10:20 AM - Catie Melchor RN

## 2023-12-04 ENCOUNTER — TELEPHONE (OUTPATIENT)
Dept: ENDOCRINOLOGY | Facility: CLINIC | Age: 43
End: 2023-12-04
Payer: COMMERCIAL

## 2023-12-04 DIAGNOSIS — Z31.69 ENCOUNTER FOR PRECONCEPTION CONSULTATION: Primary | ICD-10-CM

## 2023-12-04 NOTE — PROGRESS NOTES
Called patient to let her know Dr. Chong's recommendations. Patient stated she has never met with MFM, and does not necessarily want to take this medication. She would be interested in meeting with MFM to discuss. Order placed for referral. Patient is going to decide if she wants to schedule consult.    PEPE LLAMAS on 12/4/23 at 3:16 PM.        MD Pepe Swartz RN  Likely it will be okay for her to start with the following caveats: I would make sure the patient tells her physician that she is planning pregnancy.  Has she seen MFM before?  If not would recommend consult to review.  If she has I can send the MFM a note to confirm that they would be okay with Plaquenil      ----- Message -----  From: Pepe Llamas RN  Sent: 12/4/2023   2:51 PM EST  To: Jaymie Chong MD  Subject: Medication questions                            This patient is calling and wanted to know your recommendation of her being on Plaquenil. She stated it was a possibility to be placed on. She had a +LEELEE, and has lots of aches and pains she said that she could just take tylenol for, but wanted to know your recommendation of this medication with fertility.

## 2023-12-06 ENCOUNTER — SPECIALTY PHARMACY (OUTPATIENT)
Dept: PHARMACY | Facility: CLINIC | Age: 43
End: 2023-12-06

## 2023-12-06 DIAGNOSIS — F41.9 ANXIETY: ICD-10-CM

## 2023-12-06 PROCEDURE — RXMED WILLOW AMBULATORY MEDICATION CHARGE

## 2023-12-06 RX ORDER — HYDROXYZINE HYDROCHLORIDE 25 MG/1
25 TABLET, FILM COATED ORAL 2 TIMES DAILY
Qty: 60 TABLET | Refills: 0 | Status: SHIPPED | OUTPATIENT
Start: 2023-12-06 | End: 2024-02-15 | Stop reason: SDUPTHER

## 2023-12-11 ENCOUNTER — TELEPHONE (OUTPATIENT)
Dept: ENDOCRINOLOGY | Facility: CLINIC | Age: 43
End: 2023-12-11
Payer: COMMERCIAL

## 2023-12-11 ENCOUNTER — PHARMACY VISIT (OUTPATIENT)
Dept: PHARMACY | Facility: CLINIC | Age: 43
End: 2023-12-11
Payer: COMMERCIAL

## 2023-12-11 ENCOUNTER — TELEPHONE (OUTPATIENT)
Dept: ENDOCRINOLOGY | Facility: CLINIC | Age: 43
End: 2023-12-11

## 2023-12-11 NOTE — TELEPHONE ENCOUNTER
For Nica:: pt spoke to you, said the specialty pharmacy was to call her, she cannot get hold of them needs to start her meds tomorrow.

## 2023-12-11 NOTE — TELEPHONE ENCOUNTER
Called patient back, let her know that I reached out to Khadijah at [facility] and she will reach out to her today. Patient was planning on taking last aygestin tomorrow and starting patches Wednesday (will call us to set up cycle), hoping for FET 1/4. Will let me know if she has any issues getting her meds and need to delay cycle at all. Pt aware she does need mammogram this month, has been having issues getting ahold of the , will try again today. Told patient she should try to get in ASAP to have this done prior to transfer, patient agreeable.    12/11/23 at 11:05 AM - Catie Melchor RN      Spoke to patient again- is going to call mammogram office again to see what the hold up is-- patient aware we could just continue on Aygestin for now since we dont even know when she would be scheduled. Pt states her results have always come back very quickly and sometimes they do a follow up ultrasound same time as the mammogram. Told patient we would need to start patches by Friday to do FET on 1/4. Pt really wants to stick with these dates.  Also asking about acupuncture through , see's acupuncturist in [place] but day of transfer would like to see Pavithra Bryant if possible- will send email and inquire but told patient likely not since she is not a patient of her yet and short notice. Pt will update me regarding mammogram.    12/11/23 at 1:21 PM - Catie eMlchor RN

## 2023-12-12 ENCOUNTER — APPOINTMENT (OUTPATIENT)
Dept: MATERNAL FETAL MEDICINE | Facility: CLINIC | Age: 43
End: 2023-12-12
Payer: COMMERCIAL

## 2023-12-13 NOTE — PROGRESS NOTES
"Spoke to pt - she had her mammogram this AM    results in EPIC from today's visit 12/13/2023:  MAMMOGRAM FINDINGS: at [facility] -  \"The breast tissue is heterogeneously dense, which may obscure detection of small masses.    No suspicious masses, calcifications, architectural distortion or other abnormalities are seen in either breast.  The mammogram demonstrates that this patient has dense breasts. An additional screening examination with ABUS (automated breast ultrasound) may be considered to aid in the detection of mammographically occult cancers. ABUS exam should be obtained within 6 months of current screening mammogram unless patient is having ABUS per a high risk based screening protocol.  IMPRESSION:  There is no mammographic evidence of malignancy in either breast.  Routine screening mammogram in 1 year is recommended.\"    PT would like to start patch protocol today; nurse will review BP with Dr Chong; pt is on the calendar for 1/4/23 FET.   Nurse will call pt back to confirm start. Last Aygestin was last night.    12/13/23 at 10:59 AM - Rosie Rivero RN      "

## 2023-12-13 NOTE — PROGRESS NOTES
Boarding Pass Interval FET Checklist     Age: 43 y.o.    Provider: Jaymie Chong MD  Primary RN: Catie Melchor  Reasons for Treatment: POI  Last BMI  10/06/23 : 26.79 kg/m²         Medical History        Past Medical History:   Diagnosis Date    Papillomavirus as the cause of diseases classified elsewhere       HPV in female    Personal history of other complications of pregnancy, childbirth and the puerperium       History of spontaneous     Personal history of other diseases of the female genital tract       History of infertility    Personal history of other infectious and parasitic diseases       History of infection due to human papilloma virus (HPV)         Ectopic Risk: N     Date Done Consultation Results/Comments   10/25/23 Medication Protocol Programmed FET with estrace patches and IM P4 100 mg (2mL) + vaginal progesterone 200 mg BID - to start morning after IM P4 start  Baby ASA to start with FET   23 FET Treatment Form Enroll in EngagedMD: Yes (Catie Melchor RN)  Received and in chart: Yes (Catie Melchor RN)   23 IVF Consent Form Enroll in EngagedMD: Yes (Catie Melchor RN)  Received and in chart: Yes (Catie Melchor RN)      [place] (in) Form Enroll in EngagedMD: Yes (Catie Melchor RN)  Received and in chart: n/a-  Storage at this time     [employer] Transfer Authorization Form Enroll in EngagedMD: Yes (Catie Melchor RN)  Received and in chart: n/a- old consents     Embryo Ship In N/A     Insurance Coverage: NO   7/15/22 Financial Consult Yes     Genetic Carrier Screening Clearance Yes   N/A MFM Consult Okay to proceed? N/A   N/A Psych Consult Okay to proceed? Yes- when created embryos   N/A Genetics Consult Okay to proceed? N/A     Other     Date Done Female Labs Results/Comments   2023 T&S (Q 1 Year) ABO: A  Rh: POS  Antibody: NEG   2023 Hep B sAg NONREACTIVE   2023 Hep C AB NONREACTIVE   2023 HIV NONREACTIVE   2023 Syphilis NONREACTIVE    7/17/2023 GC/CT GC: NEGATIVE  CT: NEGATIVE   7/21/2022 Rubella (Q 5 Years) Equivocal  Booster date: 9/16/22 7/21/2022 Varicella (Q 5 Years) POSITIVE   4/24/2023 TSH 1.05 (Ref range: 0.44 - 3.98 mIU/L)   No results found for requested labs within last 365 days. HgbA1C No results found for requested labs within last 365 days.   10/6/23 Uterine Cavity Eval HSG: N/A     SIS: N/A     Hyster: (10/6/2023) Procedure: Hysteroscopic Dilation and Curettage  Preop diagnosis: Fertility testing  Post op diagnosis: Same, sp hysteroscopic dilation and curettage  Attending: MD Trish  Assistant: Fellow     Anesthesia: None   IV: None   EBL: 3 cc  Specimens: None   Complications: None   Risks benefits and alternatives of the procedure explained to the patient and informed consent was obtained. Urine pregnancy test was performed and was negative. Time out was performed. The patient was placed in the dorsal lithotomy position and a sterile speculum was placed in the vagina. The cervix was sterilized with Betadine x3. Paracervical block with lidocaine 1% was administered.   Tenaculum: at 12 o'clock  Dilation: No  The hysteroscope was placed in the cervix and advanced into the uterine cavity. Normal saline was used for distension media. The Aveta was placed in the uterus. The resector was used to remove the irregular proliferative endometrium. Images were obtained and findings noted as below.   All instruments were then removed. Good hemostasis was noted. Patient tolerated the procedure well returned to the recovery area in stable condition. .   Findings:   Cavity: irregular endometrium, now resected  Ostia: Bilateral tubal ostia visualized  Additional Notes: n/a     Adriana Johnson MD PGY 5  Reproductive Endocrinology and Infertility Fellow                    Biopsy results:  Normal secretory endometrium, resection of endometrium performed   6/2021 Pap Smear (Q 5 Years) NEGATIVE FOR INTRAEPITHELIAL LESION OR MALIGNANCY    12/2023  [facility] Mammogram ( > 40) (Q 1 Year) NORMAL- repeat 1 yr   Date Done Miscellaneous Results/Comments     BMI Checklist  BMI > 40 or < 18 Added to chart:   No     >= 45 Checklist  Added to chart:   No   **Does not need to be completed prior to placing on IVF calendar**  Reviewed and approved by JOHN BRIONES on 12/13/23 at 12:51 PM.

## 2023-12-13 NOTE — PROGRESS NOTES
"Spoke to pt - she had her mammogram this AM    results in EPIC from today's visit 12/13/2023:  MAMMOGRAM FINDINGS: at [facility] -  \"The breast tissue is heterogeneously dense, which may obscure detection of small masses.    No suspicious masses, calcifications, architectural distortion or other abnormalities are seen in either breast.  The mammogram demonstrates that this patient has dense breasts. An additional screening examination with ABUS (automated breast ultrasound) may be considered to aid in the detection of mammographically occult cancers. ABUS exam should be obtained within 6 months of current screening mammogram unless patient is having ABUS per a high risk based screening protocol.  IMPRESSION:  There is no mammographic evidence of malignancy in either breast.  Routine screening mammogram in 1 year is recommended.\"    PT would like to start patch protocol today; nurse will review BP with Dr Chong; pt is on the calendar for 1/4/23 FET.   Nurse will call pt back to confirm start. Last Aygestin was last night.    12/13/23 at 10:59 AM - Rosie Rivero RN    Patient for FET setup.   LMP: N/A (2 months of suppression)  Treatment Plan: For next cycle: Programmed FET with estrace patches and IM P4 100 mg (2mL) + vaginal progesterone 200 mg BID - to start morning after IM P4 start  Baby ASA to start with FET  Tentative lining check: 12/27/23  Tentative progesterone start: 12/30/23  Tentative transfer date: 1/4/24  Number of days of progesterone for transfer: 6 days  Treatment plan: Received by lab  Embryo # confirmed: 8 blastocysts (donor egg)  Embryo # to transfer: 1 blast  Reviewed and approved by JOHN BRIONES on 12/13/23 at 12:56 PM.    Reviewed and ok to proceed. Boarding pass signed.   MD John Espinal MD    TC to pt - reviewed MD plan above; reviewed patch administration and timing; Pt would like to move her IM ROBB sites at lining check - had issues with her sites after last " FET after about 3 weeks of injections, had pain in sciatic area and down her legs. May consider moving sites ventral and doing Z-track. Pt states that she has appt. With MFM on 12/20 for consult.  Pt expresses understanding of plan and is agreeable.    12/13/23 at 1:36 PM - Rosie Rivero RN

## 2023-12-20 ENCOUNTER — INITIAL PRENATAL (OUTPATIENT)
Dept: MATERNAL FETAL MEDICINE | Facility: CLINIC | Age: 43
End: 2023-12-20
Payer: COMMERCIAL

## 2023-12-20 VITALS
WEIGHT: 163.3 LBS | HEIGHT: 64 IN | HEART RATE: 90 BPM | DIASTOLIC BLOOD PRESSURE: 96 MMHG | BODY MASS INDEX: 27.88 KG/M2 | SYSTOLIC BLOOD PRESSURE: 135 MMHG

## 2023-12-20 DIAGNOSIS — Z31.69 ENCOUNTER FOR PRECONCEPTION CONSULTATION: ICD-10-CM

## 2023-12-20 LAB — TSH SERPL-ACNC: 1.54 MIU/L (ref 0.44–3.98)

## 2023-12-20 PROCEDURE — 36415 COLL VENOUS BLD VENIPUNCTURE: CPT | Performed by: STUDENT IN AN ORGANIZED HEALTH CARE EDUCATION/TRAINING PROGRAM

## 2023-12-20 PROCEDURE — 99215 OFFICE O/P EST HI 40 MIN: CPT | Performed by: STUDENT IN AN ORGANIZED HEALTH CARE EDUCATION/TRAINING PROGRAM

## 2023-12-20 PROCEDURE — 84443 ASSAY THYROID STIM HORMONE: CPT | Performed by: STUDENT IN AN ORGANIZED HEALTH CARE EDUCATION/TRAINING PROGRAM

## 2023-12-20 ASSESSMENT — PAIN - FUNCTIONAL ASSESSMENT: PAIN_FUNCTIONAL_ASSESSMENT: 0-10

## 2023-12-20 ASSESSMENT — PAIN SCALES - GENERAL: PAINLEVEL_OUTOF10: 6

## 2023-12-20 NOTE — PROGRESS NOTES
"Jocelyn Dee is a 44 yo  who presents for a preconception consult for advanced maternal age and subclinical hypothyroidism.    Jocelyn is currently being followed by [facility] with a plan for embryo transfer in the future (donor eggs).     Jocelyn is doing ok today. She reports she was diagnosed with subclinical hypothyroidism during her IVF work-up. She is currently taking Synthroid 50 mcg daily.     Jocelyn reports some anxiety due to the her recent failed IVF. She also has joint pain and is following Rheumatology but has no known diagnosis at this time.    PMHX: celiac disease  PSHX: tonsillectomy, hysteroscopy  OBHX: , EAB x1, SAB x1  GYN: denies a history of STIs, reports a hx of a LEEP and cold knife cone  MEDS: Synthroid 50 mcg  ALL: NKDA  SOCIAL: denies tobacco/alcohol/illicit drug use  FAMILY: reviewed and noncontributory     O: BP (!) 135/96 (BP Location: Right arm, Patient Position: Sitting)   Pulse 90   Ht 1.626 m (5' 4\")   Wt 74.1 kg (163 lb 4.8 oz)   BMI 28.03 kg/m²   Gen: NAD  Resp: nonlabored breathing  Cardiac: good peripheral perfusion  Psych: appropriate mood and affect    A/P: Jocelyn Dee is a 44 yo  who presents for a preconception consult for advanced maternal age and subclinical hypothyroidism.    Advanced maternal age: We discussed that there is an increased risk of chromosomal abnormalities for women who are conceiving at advanced maternal age, however, as Jocelyn is using a donor egg, we discussed that the risk of aneuploidy would be that of the age of the egg donor. We discussed screening and diagnostic modalities for chromosomal abnormalities that are available to all pregnant women regardless of age.       Joeclyn understands that the risk of several pregnancy complications such as preeclampsia and gestational diabetes increase with age. We would recommend monitoring for these cardiovascular and metabolic complications and to intervene as obstetrically " indicated. There is also data that suggests women over age 40 have an increased risk of stillbirth compared to a younger obstetric population. We discussed that there is no universal surveillance recommendations, but consideration could be given to obtaining growth ultrasounds at 30 and 36 weeks gestation and to begin weekly  testing at 34 weeks.     Subclinical Hypothyroidism: We discussed that subclinical hypothyroidism has not been associated with adverse effects in pregnancy. Treatment during pregnancy is generally not recommended, however, it is not unreasonable to continue her Synthroid following her IVF cycle.    Joint Pain: Jocelyn reports a history of joint pain of which she follows with Rheumatology. She has not been given a diagnosis but states her Rheumatologist is considering starting her on Plaquenil. We discussed that Plaquenil is considered to be safe in pregnancy and this would be reasonable to start taking.    Anxiety: I have recommended that Jocelyn be seen by Psychiatry. We will help coordinate an appointment for her.    Thank you for the consultation. We would be happy to see Jocelyn again once she becomes pregnant.    Darrin Kumar MD  Maternal-Fetal Medicine

## 2023-12-27 ENCOUNTER — ANCILLARY PROCEDURE (OUTPATIENT)
Dept: ENDOCRINOLOGY | Facility: CLINIC | Age: 43
End: 2023-12-27
Payer: COMMERCIAL

## 2023-12-27 DIAGNOSIS — N97.9 FEMALE INFERTILITY: ICD-10-CM

## 2023-12-27 LAB
ESTRADIOL SERPL-MCNC: 698 PG/ML
PROGEST SERPL-MCNC: 0.3 NG/ML

## 2023-12-27 PROCEDURE — 76857 US EXAM PELVIC LIMITED: CPT | Performed by: OBSTETRICS & GYNECOLOGY

## 2023-12-27 PROCEDURE — 84144 ASSAY OF PROGESTERONE: CPT

## 2023-12-27 PROCEDURE — 76857 US EXAM PELVIC LIMITED: CPT

## 2023-12-27 PROCEDURE — 82670 ASSAY OF TOTAL ESTRADIOL: CPT

## 2023-12-27 PROCEDURE — 36415 COLL VENOUS BLD VENIPUNCTURE: CPT

## 2023-12-27 RX ORDER — ESTRADIOL 0.1 MG/D
FILM, EXTENDED RELEASE TRANSDERMAL
Qty: 8 PATCH | Refills: 3 | Status: SHIPPED | OUTPATIENT
Start: 2023-12-27 | End: 2024-01-05 | Stop reason: SDUPTHER

## 2023-12-27 RX ORDER — PROGESTERONE 200 MG/1
200 CAPSULE ORAL 2 TIMES DAILY
Qty: 60 CAPSULE | Refills: 2 | Status: SHIPPED | OUTPATIENT
Start: 2023-12-27 | End: 2024-03-26

## 2023-12-27 RX ORDER — PROGESTERONE 50 MG/ML
100 INJECTION, SOLUTION INTRAMUSCULAR DAILY
Qty: 50 ML | Refills: 3
Start: 2023-12-27 | End: 2024-03-26

## 2023-12-27 RX ORDER — ESTRADIOL 0.1 MG/D
3 FILM, EXTENDED RELEASE TRANSDERMAL EVERY OTHER DAY
Qty: 48 PATCH | Refills: 2
Start: 2023-12-27 | End: 2024-01-05 | Stop reason: SDUPTHER

## 2023-12-27 NOTE — PROGRESS NOTES
CYCLING NOTE    Here for US and/or lab monitoring; relevant findings reviewed.    Patient stayed for nurse visit. Pain is 0/10 Patient had lining check today, discussed US. Patient will plan on starting progesterone in oil 100mg on 12/30 and will plan on vaginal progesterone 200 mg BID starting 12/31. Tentative transfer date on 1/4/24. Sites marked and ROBB instructions given.   Team will contact patient later today with results and plan.  Component      Latest Ref Rng 12/27/2023   Progesterone      ng/mL 0.3    Estradiol      pg/mL 698          Danielle Young  12/27/2023  8:22 AM    HUDDLE PROVIDER NOTE  Ultrasound and/or labs reviewed at [facility].   Results for orders placed or performed in visit on 12/27/23 (from the past 96 hour(s))   Progesterone   Result Value Ref Range    Progesterone 0.3 ng/mL   Estradiol   Result Value Ref Range    Estradiol 698 pg/mL     Sat 12/30 (Day 18)   progesterone injection: Inject 100 mg once daily into the muscle    Sun 12/31 (Day 19)   progesterone capsule: Insert 200 mg 2 times a day into the vagina   progesterone injection: Inject 100 mg once daily into the muscle    Mon 1/1 (Day 20)   progesterone capsule: Insert 200 mg 2 times a day into the vagina   progesterone injection: Inject 100 mg once daily into the muscle    Tue 1/2 (Day 21)   progesterone capsule: Insert 200 mg 2 times a day into the vagina   progesterone injection: Inject 100 mg once daily into the muscle    Wed 1/3 (Day 22)   progesterone capsule: Insert 200 mg 2 times a day into the vagina   progesterone injection: Inject 100 mg once daily into the muscle    Thu 1/4 (Day 23)   progesterone capsule: Insert 200 mg 2 times a day into the vagina   progesterone injection: Inject 100 mg once daily into the muscle    Continue estrace patch every other day as schedule.    RTC in  1/4/2023  for  Embryo transfer  and Progesterone.   Jaymie Chong  12/27/2023  1:04 PM    Called patient with plan. Patient will await  for a call from the lab for a transfer time and will drink two glasses of water prior to her transfer. Pt to start ROBB on 12/30 and start pv prog on 12/31 bid. Patient agreeable to plan. Patient will start baby asa with transfer.   SATISH PRICE on 12/27/23 at 1:56 PM.

## 2023-12-29 ENCOUNTER — TELEPHONE (OUTPATIENT)
Dept: ENDOCRINOLOGY | Facility: CLINIC | Age: 43
End: 2023-12-29
Payer: COMMERCIAL

## 2023-12-29 NOTE — TELEPHONE ENCOUNTER
Patient is calling to check with a nurse if she can swim if she is on Progesterone vag suppository Rx    She wanted to know if she could still swim while using the vaginal suppositories (yes as long as she is comfortable) and also wanted to know what time the lab would call her re: her FET time - typically in the morning before 9 or 0930. Patient verbalized understanding.   Priscila Mi RN 12/29/23 8:55 AM

## 2024-01-02 ENCOUNTER — TELEPHONE (OUTPATIENT)
Dept: ENDOCRINOLOGY | Facility: CLINIC | Age: 44
End: 2024-01-02
Payer: COMMERCIAL

## 2024-01-02 NOTE — TELEPHONE ENCOUNTER
TC returned to pt. She is on day #4 of ROBB injections and scheduled for FET on 1/4/24. Pt states that she is having numbness at the injection site and down her leg, only on the right side; reports that the left side is doing fine; she did have this problem last cycle, but not until after the injections ended.  Pt will come tomorrow @ 0640 AM for ROBB injection and assessment of sites;  is coming with her.     01/02/24 at 9:58 AM - Rosie Rivero RN

## 2024-01-03 ENCOUNTER — PREP FOR PROCEDURE (OUTPATIENT)
Dept: ENDOCRINOLOGY | Facility: CLINIC | Age: 44
End: 2024-01-03

## 2024-01-03 ENCOUNTER — CLINICAL SUPPORT (OUTPATIENT)
Dept: ENDOCRINOLOGY | Facility: CLINIC | Age: 44
End: 2024-01-03
Payer: COMMERCIAL

## 2024-01-03 RX ORDER — ACETAMINOPHEN 325 MG/1
650 TABLET ORAL ONCE AS NEEDED
Status: CANCELLED | OUTPATIENT
Start: 2024-01-03

## 2024-01-03 NOTE — PROGRESS NOTES
Pt here for ROBB injection site assessment. Pt reports onset of sciatica pain on right side which started after her 2nd injection on that side 2 days ago; states that she felt the pain at the time of the injection and now has pain and numbness down into her right thigh; pt does have a h/o lower back arthritis and the same issue with injections on the left side last cycle (which has now resolved). Injection sites without erythema; right side with small yellowing bruise (has done last two injections on the left side).      ROBB sites remarked with surgical pen; reviewed Z-track with  as option if new sites have same issues; 100 mg ROBB given to pt by this RN today in Left VG site; pt tolerated well (barely felt it).  Will plan tomorrow's injection in Left hip again and RN will review with MD today today and notify pt of any change to plan. Pt advised to call if no improvement in numbness/ pain of right side.    01/03/24 at 7:16 AM - Rosie Rivero RN    Reviewed at noon meeting- having pain and numbness on her right side. Had this on the left last time per RN. Has hx lower back pain and arthritis. Can do injections for a few days on the left side. Can consider ethyl oleate as next step. Can also cancel and try with a natural cycle. Rosie will reach out to discuss further and I can call her if needed.     01/03/24 at 12:40 PM - Catie Lira MD    D/w DR Chong and Pankaj - AMH undetectable, so stimmed FET not likely. Per Dr. Chong, she will review other options for luteal support with pt tomorrow at the FET - she does not want to cancel FET.   No change to plan as RN  left with pt this AM.    01/03/24 at 2:20 PM - Rosie Rivero RN

## 2024-01-04 ENCOUNTER — HOSPITAL ENCOUNTER (OUTPATIENT)
Dept: ENDOCRINOLOGY | Facility: CLINIC | Age: 44
Discharge: HOME | End: 2024-01-04
Payer: COMMERCIAL

## 2024-01-04 DIAGNOSIS — Z31.83 ENCOUNTER FOR ASSISTED REPRODUCTIVE FERTILITY CYCLE: ICD-10-CM

## 2024-01-04 DIAGNOSIS — N97.9 FEMALE INFERTILITY: ICD-10-CM

## 2024-01-04 DIAGNOSIS — Z32.00 UNCONFIRMED PREGNANCY: Primary | ICD-10-CM

## 2024-01-04 LAB — PROGEST SERPL-MCNC: 51 NG/ML

## 2024-01-04 PROCEDURE — 36415 COLL VENOUS BLD VENIPUNCTURE: CPT

## 2024-01-04 PROCEDURE — 58974 EMBRYO TRANSFER INTRAUTERINE: CPT | Performed by: OBSTETRICS & GYNECOLOGY

## 2024-01-04 PROCEDURE — 89255 PREPARE EMBRYO FOR TRANSFER: CPT | Performed by: OBSTETRICS & GYNECOLOGY

## 2024-01-04 PROCEDURE — 89253 EMBRYO HATCHING: CPT | Performed by: OBSTETRICS & GYNECOLOGY

## 2024-01-04 PROCEDURE — 36415 COLL VENOUS BLD VENIPUNCTURE: CPT | Performed by: OBSTETRICS & GYNECOLOGY

## 2024-01-04 PROCEDURE — 89352 THAWING CRYOPRESRVED EMBRYO: CPT | Performed by: OBSTETRICS & GYNECOLOGY

## 2024-01-04 PROCEDURE — 76998 US GUIDE INTRAOP: CPT | Performed by: OBSTETRICS & GYNECOLOGY

## 2024-01-04 PROCEDURE — 84144 ASSAY OF PROGESTERONE: CPT

## 2024-01-04 PROCEDURE — 84144 ASSAY OF PROGESTERONE: CPT | Performed by: OBSTETRICS & GYNECOLOGY

## 2024-01-04 RX ORDER — ACETAMINOPHEN 325 MG/1
650 TABLET ORAL ONCE AS NEEDED
Status: DISCONTINUED | OUTPATIENT
Start: 2024-01-04 | End: 2024-01-05 | Stop reason: HOSPADM

## 2024-01-04 NOTE — PROGRESS NOTES
Patient ID: Jocelyn Dee is a 43 y.o. female.    Embryo Transfer    Date/Time: 1/4/2024 12:20 PM    Performed by: Jaymie Chong MD  Authorized by: Jaymie Chong MD    Consent:     Consent obtained:  Written    Consent given by:  Patient    Procedure risks and benefits discussed: yes      Patient questions answered: yes      Patient agrees, verbalizes understanding, and wants to proceed: yes      Educational handouts given: yes      Instructions and paperwork completed: yes    Procedure:     Pelvic exam performed: No      Cervix cleaned and prepped: Yes      Speculum placed in vagina: Yes      Ultrasound guidance: Yes      Catheter type:  Sure View Balderrama    Uterine position:  Retroverted    Uterine visualization complete: Yes      Difficulty: easy      Estimated blood loss:  None    Embryos transferred:  1    Catheter navigation notes:  Bladder drained of 200 ml prior to transfer  Easy, Afterload  Post-procedure:     Patient tolerated procedure well: yes    Comments:      Preop diagnosis: Infertility  Post op diagnosis: Same  Assistant: Elizabeth  Depth: - cm  Curve: 30 deg  Distance from fundus: 14 mm  Embryo stage at day of transfer: day 5  Embryo notes: blastocyst   Additional Notes:  Anesthesia: None    IV Fluids: None  UOP: Not recorded  Specimens: None  Complications: None  This replaces an operative report.

## 2024-01-04 NOTE — DISCHARGE INSTRUCTIONS
[facility]  [address]. [place]  [address]  Tel: [phone]   Fax: [phone]    Home Going Instructions after Embryo Transfer:     After completing your embryo transfer please treat your body as though you are pregnant.  No smoking, alcohol, or recreational drugs.  Make sure you are taking a prenatal vitamin daily.   Continue all medications currently prescribed for embryo transfer until otherwise instructed by your physician, even if you experience spotting or bleeding and even if you have a negative home pregnancy test.   Medications to avoid in pregnancy: Advil, Motrin, Ibuprofen, Aleve, Excedrin, Miroslava-[place], Sudafed, and Pepto-Bismol. Avoid aspirin unless you are taking this daily at the direction of your physician.   Medications that are generally safe in pregnancy: Tylenol, Benadryl, Plain Robitussin, Zyrtec, Claritin, Pepcid, Tums, Rolaids, Mylanta, Nasonex.   Foods to avoid:   Seafood that is high in mercury; you can have canned tuna twice a week.   Deli meats, deli salads, hot dogs, and unpasteurized cheeses due to the risk of Listeria.  Activities to avoid:   No hot tubs, whirlpools, or saunas.  Avoid starting new exercise routines that you have not done previously.  Avoid lifting > 30 lbs.  No cleaning litter boxes.  No intercourse until you test for pregnancy.   You do not need to be on bed rest following the transfer. It is healthy, normal, and recommended to go about routine physical activity.   We advise against taking a home pregnancy test due to the possibility of false negative and false positive results.   You will test for pregnancy in approximately 10 days, at which point you will be about 4 weeks pregnant.   If blood work was drawn on the day of your transfer, you can expect a phone that day with the results of your bloodwork. We expect a progesterone level greater than or equal to 16. If you level is less than 16  we will adjust your progesterone dose and repeat your level in 2 days.     Analy Benitez    11:24 AM

## 2024-01-05 ENCOUNTER — TELEPHONE (OUTPATIENT)
Dept: ENDOCRINOLOGY | Facility: CLINIC | Age: 44
End: 2024-01-05
Payer: COMMERCIAL

## 2024-01-05 ENCOUNTER — PHARMACY VISIT (OUTPATIENT)
Dept: PHARMACY | Facility: CLINIC | Age: 44
End: 2024-01-05
Payer: MEDICARE

## 2024-01-05 ENCOUNTER — SPECIALTY PHARMACY (OUTPATIENT)
Dept: PHARMACY | Facility: CLINIC | Age: 44
End: 2024-01-05

## 2024-01-05 DIAGNOSIS — N97.9 FEMALE INFERTILITY: ICD-10-CM

## 2024-01-05 PROCEDURE — RXMED WILLOW AMBULATORY MEDICATION CHARGE

## 2024-01-05 RX ORDER — ESTRADIOL 0.1 MG/D
3 FILM, EXTENDED RELEASE TRANSDERMAL EVERY OTHER DAY
Qty: 135 PATCH | Refills: 0 | Status: SHIPPED | OUTPATIENT
Start: 2024-01-05 | End: 2024-03-13 | Stop reason: ALTCHOICE

## 2024-01-05 RX ORDER — ESTRADIOL 0.1 MG/D
3 FILM, EXTENDED RELEASE TRANSDERMAL EVERY OTHER DAY
Qty: 136 PATCH | Refills: 0 | Status: SHIPPED | OUTPATIENT
Start: 2024-01-05 | End: 2024-03-13 | Stop reason: ALTCHOICE

## 2024-01-05 RX ORDER — ESTRADIOL 0.1 MG/D
3 FILM, EXTENDED RELEASE TRANSDERMAL EVERY OTHER DAY
Qty: 135 PATCH | Refills: 0 | Status: SHIPPED | OUTPATIENT
Start: 2024-01-05 | End: 2024-01-05 | Stop reason: SDUPTHER

## 2024-01-05 NOTE — TELEPHONE ENCOUNTER
Returned patient's call. Patient is running low on her estrace patches, and would like a refill. Patient has it previously filled at [facility], but would like to try to order through [employer] 90 day supply as her insurance will cover that. Will verify with pharmacy if they have in stock, and send over so patient has it before the weekend.    PEPE LLAMAS on 1/5/24 at 1:56 PM.

## 2024-01-05 NOTE — PROGRESS NOTES
Pt here for ROBB injection site assessment. Pt reports onset of sciatica pain on right side which started after her 2nd injection on that side 2 days ago; states that she felt the pain at the time of the injection and now has pain and numbness down into her right thigh; pt does have a h/o lower back arthritis and the same issue with injections on the left side last cycle (which has now resolved). Injection sites without erythema; right side with small yellowing bruise (has done last two injections on the left side).      ROBB sites remarked with surgical pen; reviewed Z-track with  as option if new sites have same issues; 100 mg ROBB given to pt by this RN today in Left VG site; pt tolerated well (barely felt it).  Will plan tomorrow's injection in Left hip again and RN will review with MD today today and notify pt of any change to plan. Pt advised to call if no improvement in numbness/ pain of right side.    01/05/24 at 10:13 AM - Rosie Rivero RN    Reviewed at noon meeting- having pain and numbness on her right side. Had this on the left last time per RN. Has hx lower back pain and arthritis. Can do injections for a few days on the left side. Can consider ethyl oleate as next step. Can also cancel and try with a natural cycle. Rosie will reach out to discuss further and I can call her if needed.     01/05/24 at 10:13 AM - Rosie Rivero RN    D/w DR Chong and Pankaj - AMH undetectable, so stimmed FET not likely. Per Dr. Chong, she will review other options for luteal support with pt tomorrow at the FET - she does not want to cancel FET.   No change to plan as RN  left with pt this AM.    01/05/24 at 10:13 AM - Rosie Rivero RN

## 2024-01-05 NOTE — TELEPHONE ENCOUNTER
Returned patient's call. Patient tried calling [facility] pharmacy, and they do have estrace patches in stock, but no order was sent in yet. Message sent to NP of the day to sign off patches. Patient will order one Rx from [facility] to get in time before the weekend. Patient does have enough patches to replace on Sunday, but will need more next week. Patient aware if she does not get order sent in by today, to call on Monday to make sure she gets more before Tuesday.    PEPE LLAMAS on 1/5/24 at 3:20 PM.

## 2024-01-05 NOTE — TELEPHONE ENCOUNTER
Patient would like a call back - said she called earlier asking about getting her meds ordered and is concerned because she hasn't heard back yet.

## 2024-01-08 ENCOUNTER — TELEPHONE (OUTPATIENT)
Dept: ENDOCRINOLOGY | Facility: CLINIC | Age: 44
End: 2024-01-08
Payer: COMMERCIAL

## 2024-01-08 NOTE — TELEPHONE ENCOUNTER
Patient had transfer and she has a cold sore and wants to know what medication can she use for it

## 2024-01-11 DIAGNOSIS — E03.8 SUBCLINICAL HYPOTHYROIDISM: ICD-10-CM

## 2024-01-11 DIAGNOSIS — N97.9 FEMALE INFERTILITY: ICD-10-CM

## 2024-01-11 RX ORDER — LIDOCAINE AND PRILOCAINE 25; 25 MG/G; MG/G
CREAM TOPICAL
Qty: 60 G | Refills: 2 | Status: SHIPPED | OUTPATIENT
Start: 2024-01-11 | End: 2024-06-10 | Stop reason: WASHOUT

## 2024-01-11 NOTE — TELEPHONE ENCOUNTER
"Called patient back, patient asking if it is normal for her to \"lose pregnancy symptoms\" when using the estrace patches, asked patient if she has taken a pregnancy test or what she means by having pregnancy symptoms (no confirmed pregnancy yet, had FET a week ago), patient states she has not taken a test but she has been having some breast tenderness and other symptoms similar to pregnancy, reassured patient this is likely hormonal from the medications (estrace patches and Progesterone) and symptoms can fluctuate, symptoms for patient keep to disappear the day she switches her patches, told patient this may be because she is at her trough level when she is replacing the patches and that is why she may have a decrease in these symptoms. Patient verbalizes understanding and reassured, patient wanted to make sure she is not doing anything wrong.   Patient also states she has been having to give her injection in the same leg every day still due to not being able to tolerate on the other side, told patient this should be ok as long as the site looks ok. Will send in for refill of lidocaine. Has [facility] Monday.    01/11/24 at 12:52 PM - Catie Melchor RN    "

## 2024-01-15 ENCOUNTER — LAB (OUTPATIENT)
Dept: LAB | Facility: LAB | Age: 44
End: 2024-01-15
Payer: COMMERCIAL

## 2024-01-15 ENCOUNTER — TELEPHONE (OUTPATIENT)
Dept: ENDOCRINOLOGY | Facility: CLINIC | Age: 44
End: 2024-01-15

## 2024-01-15 DIAGNOSIS — Z32.00 UNCONFIRMED PREGNANCY: ICD-10-CM

## 2024-01-15 LAB — B-HCG SERPL-ACNC: <2 MIU/ML

## 2024-01-15 PROCEDURE — 36415 COLL VENOUS BLD VENIPUNCTURE: CPT

## 2024-01-15 PROCEDURE — 84702 CHORIONIC GONADOTROPIN TEST: CPT

## 2024-01-15 NOTE — PROGRESS NOTES
Initial HCG:  <2  Patient's [facility] Provider: Jaymie Chong MD  Infertility dx: Primary Ovarian Insufficiency  Achieved pregnancy by: Embryo transfer  Fertility medications used this cycle: luteal progesterone  LMP: Patient's last menstrual period was N/A- suppression prior to transfer (~few months)  EGA based on (IUI date, ET ): embryo transfer date 1- 4w2d    **September had negative BHCG post donor egg FET    Updated G/P with this pregnancy:   OB HX:    1. -ETOP age 33  2. 2018-SAB, 1st trimester, missed ab. Conceived easily. Passed with cytotec. Age 37  -Both of the above with different partners    Type & Screen: No results found for requested labs within last 365 days.  History of miscarriage: Yes, 2  History of pelvic/abdominal surgeries: No  History of STDs/PID: No  Hx of ectopic: No  Hx of tubal disease/ blockage: No    Risk for ectopic: No      Current medications:     Current Outpatient Medications:     cholecalciferol (Vitamin D-3) 25 MCG (1000 UT) capsule, Take 1 capsule (25 mcg) by mouth once daily., Disp: , Rfl:     estradiol (Vivelle-Dot) 0.1 mg/24 hr patch, Apply three (3) patches to lower stomach once every other day as directed per provider., Disp: 136 patch, Rfl: 0    estradiol (Vivelle-DOT) 0.1 mg/24 hr patch, Place 3 patches on the skin every other day., Disp: 135 patch, Rfl: 0    fluticasone (Flonase) 50 mcg/actuation nasal spray, Administer 1 spray into each nostril once daily. Shake gently. Before first use, prime pump. After use, clean tip and replace cap., Disp: 16 g, Rfl: 5    hydrOXYzine HCL (Atarax) 25 mg tablet, Take 1 tablet (25 mg) by mouth 2 times a day., Disp: 60 tablet, Rfl: 0    levothyroxine (Synthroid, Levoxyl) 50 mcg tablet, Take 1 tablet (50 mcg) by mouth once daily., Disp: 30 tablet, Rfl: 2    lidocaine-prilocaine (Emla) 2.5-2.5 % cream, APPLY TO TREATMENT AREA 1 HOUR PRIOR TO INJECTION., Disp: 60 g, Rfl: 2    LORazepam (Ativan) 0.5 mg tablet, Take 1  tablet (0.5 mg) by mouth if needed for anxiety (flight anxiety; take prior to flight) for up to 6 doses., Disp: 6 tablet, Rfl: 0    norethindrone (Aygestin) 5 mg tablet, Take 1 tablet (5 mg) by mouth once daily., Disp: , Rfl:     prenat.vits,shellie,min-iron-folic tablet, Take 1 tablet by mouth once daily., Disp: , Rfl:     progesterone (Prometrium) 200 mg capsule, Take 1 capsule (200 mg) by mouth. TAKE 1 CAPSULE BY MOUTH EVERY DAY FOR THE FIRST 12 DAYS OF EACH MONTH, Disp: , Rfl:     progesterone (Prometrium) 200 mg capsule, Insert 1 capsule (200 mg) into the vagina 2 times a day., Disp: 60 capsule, Rfl: 2    progesterone 50 mg/mL injection, DRAW UP 2 ML (100 MG) AND INJECT INTO THE MUSCLE AT THE SAME TIME EACH MORNING AS DIRECTED PER PROVIDER., Disp: 60 mL, Rfl: 2    progesterone 50 mg/mL injection, Inject 2 mL (100 mg) into the muscle once daily. Draw up 1.5 mL (75mg) and inject into the muscle at the same time each morning as directed per provider., Disp: 50 mL, Rfl: 3    Soolantra 1 % cream, 1 Application once daily. APPLY TOPICALLY TO FACE 1 TIME IN THE MORNING, Disp: , Rfl:     PNV: Yes  Vitamin D 2000 IUs: Yes  Luteal support: IM ROBB 100 mg daily  Additional medications: Estrace patches, 200mg Progesterone BID, 81mg Baby ASA    Labs ordered/Plan:   [facility] ordered. Team will reach out to patient with results and plan.   Discussed early pregnancy versus miscarriage versus ectopic. Precautions given.   Patient expresses understanding of plan and is agreeable.    Catie Ruiz  01/15/2024  9:37 AM    Neg HCG after donor egg embryo transfer. Did 3 months of aygestin suppression and this is her first transfer coming off suppression. She had neg HCG prior to this as well. Stop meds and anticipate a period. She has seven embryos left in cryo. Consider PGT of remaining embryos. Consider prednisone with a future ET given hx pos LEELEE.   Catie Lira MD  01/15/24      Called patient with results, told patient she can go  ahead and stop her medications and will get a period, will send message to Dr. Chong to follow up when she is back in town, asked patient if she would like a call from Dr. Lira and patient would like a call from her- will send her a message to reach out within the next couple days.    01/15/24 at 2:05 PM - Catie Melchor RN

## 2024-01-18 DIAGNOSIS — E03.8 SUBCLINICAL HYPOTHYROIDISM: ICD-10-CM

## 2024-01-18 RX ORDER — LEVOTHYROXINE SODIUM 50 UG/1
50 TABLET ORAL DAILY
Qty: 30 TABLET | Refills: 2 | Status: SHIPPED | OUTPATIENT
Start: 2024-01-18 | End: 2024-04-12

## 2024-01-18 RX ORDER — LEVOTHYROXINE SODIUM 50 UG/1
50 TABLET ORAL DAILY
Qty: 30 TABLET | Refills: 2 | Status: SHIPPED | OUTPATIENT
Start: 2024-01-18 | End: 2024-01-18 | Stop reason: SDUPTHER

## 2024-01-19 ENCOUNTER — DOCUMENTATION (OUTPATIENT)
Dept: ENDOCRINOLOGY | Facility: CLINIC | Age: 44
End: 2024-01-19
Payer: COMMERCIAL

## 2024-01-19 ENCOUNTER — TELEPHONE (OUTPATIENT)
Dept: ENDOCRINOLOGY | Facility: CLINIC | Age: 44
End: 2024-01-19
Payer: COMMERCIAL

## 2024-01-19 DIAGNOSIS — Z31.41 FERTILITY TESTING: ICD-10-CM

## 2024-01-19 DIAGNOSIS — N97.9 FEMALE INFERTILITY: Primary | ICD-10-CM

## 2024-01-19 NOTE — PROGRESS NOTES
Called patient to review negative hCG following donor egg FET.    Agency donor- 23 eggs, 17 mature, 15 2pn  9 blasts frozen    FET #1- Programmed FET with oral estrace and IM P4 75 mg--> had spotting prior to FET, negative hCG    FET #2- Aygestin pre-treatment x 1 month, Estrace patches and IM P4 100 mg+ vaginal prometrium 200 mg BID--> negative hCG    7 donor egg blasts remaining    We discussed several options for moving forward.  Plan:  This month plan mock cycle with Receptiva (BCL6 and ) as well as ERA testing.  For mock cycle- Estrace patches, IM P4 100 mg+ vaginal prometrium 200 mg BID (starting 1 day after IM P4)  Will address any abnormal testing found on above  Patient will consider starting plaqeunil for join pain and plans anti-inflammatory diet.  Will consider prednisone with next transfer pending Rheumatology recommendations.    SAMANTA HILLIARD on 1/19/24 at 2:55 PM.

## 2024-01-19 NOTE — TELEPHONE ENCOUNTER
----- Message from Jaymie Chong MD sent at 1/19/2024  2:55 PM EST -----  Regarding: RE: follow up after -BHCG  I spoke to her and we planned mock cycle this month- please see my note from today with plan and let me know if you have questions  ----- Message -----  From: Catie Melchor RN  Sent: 1/18/2024  10:59 AM EST  To: Jaymie Chong MD  Subject: follow up after -BHCG                            Hi Dr. Chong,    Welcome back!    Dr. Lira said she would follow up with this patient earlier this week while you were out since the patient was wanting to speak to someone, I dont think she was able to get to her, the patient said she hasn't spoken with anyone yet. Are you able to follow up with her today or tomorrow? I believe this is her second -BHCG post FET with donor egg embryos. She did a few months of Aygestin leading in. Not sure what next steps are.    I apologize if I sent you this already, I couldn't find it in my sent messages and wanted to make sure she got followed up on.      TC to pt:  Spoke to pt re:mock cycle. Will plan to go into with regular flow - pt is spotting; she will do patch protocol - has at home; will do med inventory and call on Monday to let us know what day she started her period to get scheduled for lining check. (EMB is for Era, BCL6 AND  - aware of cost.    01/19/24 at 4:37 PM - Rosie Rievro RN

## 2024-01-22 NOTE — TELEPHONE ENCOUNTER
Returned patient call, patient LMP 1/20, started estrace patches (3) and will remove and replace every other day. Will plan for MOCK cycle lining check on 2/1 and a ERA and Receptica ( and BCL6) biopsy with Sailaja Ibarra on 2/9 at [place].  Patient will touch base with rheumatologist regarding prednisone.  Patient asking if there are any other immune testing we would recommend her having done at this time, is willing to do any testing to avoid this from occurring again and has some immune issues so open to other testing. Will send message to Dr. Chong to find out her recommendation.    01/22/24 at 3:53 PM - Catie Melchor RN

## 2024-01-23 DIAGNOSIS — Z00.00 HEALTHCARE MAINTENANCE: ICD-10-CM

## 2024-01-23 DIAGNOSIS — N97.9 FEMALE INFERTILITY: ICD-10-CM

## 2024-01-23 DIAGNOSIS — Z31.83 ENCOUNTER FOR ASSISTED REPRODUCTIVE FERTILITY CYCLE: ICD-10-CM

## 2024-01-23 DIAGNOSIS — N96 RECURRENT PREGNANCY LOSS WITHOUT CURRENT PREGNANCY: ICD-10-CM

## 2024-01-23 DIAGNOSIS — Z11.3 SCREENING FOR STDS (SEXUALLY TRANSMITTED DISEASES): ICD-10-CM

## 2024-01-23 DIAGNOSIS — Z32.00 PREGNANCY EXAMINATION OR TEST, PREGNANCY UNCONFIRMED: ICD-10-CM

## 2024-01-23 DIAGNOSIS — Z13.6 SCREENING FOR HEART DISEASE: ICD-10-CM

## 2024-01-23 DIAGNOSIS — Z01.812 ENCOUNTER FOR PREPROCEDURAL LABORATORY EXAMINATION: ICD-10-CM

## 2024-01-23 DIAGNOSIS — Z13.1 SCREENING FOR DIABETES MELLITUS: ICD-10-CM

## 2024-01-23 DIAGNOSIS — Z31.41 FERTILITY TESTING: ICD-10-CM

## 2024-01-23 DIAGNOSIS — E03.8 SUBCLINICAL HYPOTHYROIDISM: Primary | ICD-10-CM

## 2024-01-23 NOTE — PROGRESS NOTES
Boarding Pass Interval FET Checklist     Age: 43 y.o.    Provider: Jaymie Chong MD  Primary RN: Catie Melchor  Reasons for Treatment: POI  Last BMI  10/06/23 : 26.79 kg/m²       Primary Ovarian Insufficiency  44 y.o.  , Primary Ovarian Insufficiency and the following pertinent medical issues:   Planning another donor egg FET; history of +LEELEE, +BCL6 failed donor egg FET x2,  .     Medical History           Past Medical History:   Diagnosis Date    Papillomavirus as the cause of diseases classified elsewhere       HPV in female    Personal history of other complications of pregnancy, childbirth and the puerperium       History of spontaneous     Personal history of other diseases of the female genital tract       History of infertility    Personal history of other infectious and parasitic diseases       History of infection due to human papilloma virus (HPV)         Ectopic Risk: N     Date Done Consultation Results/Comments   10/25/23 Medication Protocol Programmed FET with estrace patches and IM P4 100 mg (2mL) + vaginal progesterone 200 mg BID - to start morning after IM P4 start  Baby ASA to start with FET    ADDENDUM  9/3/2024  Fertility Plan Update:  Lupron + Letrozole x 2 months prior to FET- follow up P4 level today, if negatives start Provera 10 mg daily  x 7 days and start Lupron and Letrozole same time  For next FET-  Programmed FET  - Estrace patches, IM P4 100 mg+ vaginal prometrium 200 mg BID (starting 1 day after IM P4)  Prednisone  5 mg to start with progesterone per protocol (dose of 5 mg requested by rheumatology)   Stay on Plaquenil per rheumatology recommendations   Jaymie Chong  2024  1:10 PM   2024 FET Treatment Form Enroll in EngagedMD: Yes (Catie Melchor RN)  Received and in chart: Yes (Catie Melchor RN)     2024 IVF Consent Form Enroll in EngagedMD: Yes (Catie Melchor RN)  Received and in chart: Yes (Catie Melchor RN)    Updated:reminder sent  10/8/2024      [place]avier (in) Form Enroll in EngagedMD: Yes (Catie Melchor RN)  Received and in chart: n/a-  Storage at this time         [employer] Transfer Authorization Form Enroll in EngagedMD: Yes (Catie Melchor RN)  Received and in chart: n/a- old consents     Embryo Ship In N/A     embryos - at  per lab 10/8/24     Insurance Coverage: NO   2024 Financial Consult Yes     Genetic Carrier Screening Clearance Yes   2023 MFM Consult Okay to proceed? yes   N/A Psych Consult Okay to proceed? Yes- when created embryos   N/A Genetics Consult Okay to proceed? N/A     Other     Date Done Female Labs Results/Comments   2024 T&S (Q 1 Year) ABO: A  Rh: POS  Antibody: NEG   9/3/2024 Hep B sAg NONREACTIVE   9/3/2024 Hep C AB NONREACTIVE   9/3/2024 HIV NONREACTIVE   9/3/2024 Syphilis NONREACTIVE   9/3/2024 GC/CT GC: NEGATIVE  CT: NEGATIVE   2022 Rubella (Q 5 Years) Equivocal  Booster date: 22 Varicella (Q 5 Years) POSITIVE   9/3/2024 TSH 0.91 (Ref range: 0.44 - 3.98 mIU/L)   2024 HgbA1C 5.2%   10/6/23 Uterine Cavity Eval HS2024  Uterus:  normal contour without filling defects  Tubes:  bilateral patency with free spill of dye, normal tubal architecture noted, and no loculations present.  Based on these findings, my recommendation is: No further follow up required.      SIS: N/A     Hyster: (10/6/2023) Procedure: Hysteroscopic Dilation and Curettage  Preop diagnosis: Fertility testing  Post op diagnosis: Ford, cair hysteroscopic dilation and curettage  Attending: MD Trish  Assistant: Fellow     Anesthesia: None   IV: None   EBL: 3 cc  Specimens: None   Complications: None   Risks benefits and alternatives of the procedure explained to the patient and informed consent was obtained. Urine pregnancy test was performed and was negative. Time out was performed. The patient was placed in the dorsal lithotomy position and a sterile speculum was placed in the vagina. The cervix was  sterilized with Betadine x3. Paracervical block with lidocaine 1% was administered.   Tenaculum: at 12 o'clock  Dilation: No  The hysteroscope was placed in the cervix and advanced into the uterine cavity. Normal saline was used for distension media. The Aveta was placed in the uterus. The resector was used to remove the irregular proliferative endometrium. Images were obtained and findings noted as below.   All instruments were then removed. Good hemostasis was noted. Patient tolerated the procedure well returned to the recovery area in stable condition. .   Findings:   Cavity: irregular endometrium, now resected  Ostia: Bilateral tubal ostia visualized  Additional Notes: n/a     Adriana Johnson MD PGY 5  Reproductive Endocrinology and Infertility Fellow     Biopsy results 2/9/2024:  Normal secretory endometrium, resection of endometrium performed  BCL6: Positive for over expression  : Negative for endometritis     Pelvic MRI 6/27/2024  Narrative & Impression   Interpreted By:  Michael Jorge,  and Jann Tse   STUDY:  MR PELVIS W AND WO IV CONTRAST;  6/27/2024 10:04 am      INDICATION:  Signs/Symptoms:ENdometriosis protocol.      COMPARISON:  None.      ACCESSION NUMBER(S):  [id number]      ORDERING CLINICIAN:  NANCY BEASLEY      TECHNIQUE:  Multiplanar MRI of the pelvis was obtained including axial, sagittal  and coronal T2 weighted SSFSE, (para)axial, (para)coronal and  sagittal T2 FSE , axial DWI, pre and post gadolinium dynamic T1 GRE  sequences in 3 planes.  15 mL of Dotarem was administered intravenously without complication.      FINDINGS:  UTERUS:  The uterus is retroverted and measures 5.6 x 2.8 x 7.2 cm. There is a  subserosal fibroid in the anterior uterine wall near the fundus  measuring 1.8 x 1 cm. The thickness of the junctional zone is within  normal limits.   The endometrium has normal thickness and appearance.  There are small nabothian cysts. The vagina and vulva are  otherwise  unremarkable. Note is made of T2 hypointense asymmetric thickening of  the left round ligament. No additional T2 hypointense plaques or  thickening of the pelvic ligaments is noted.      OVARIES/ADNEXA:      RIGHT:  The right ovary is normal in size and appears unremarkable.   There  is no cystic or solid mass or endometriosis. There is  no  hydrosalpinx.      LEFT:  The left ovary is normal in size and appears unremarkable.   There is  no cystic or solid mass or endometriosis. There is  no hydrosalpinx.      PERITONEAL FLUID:  No  free or loculated fluid collections are evident in the pelvis.      PELVIC LYMPH NODES:  No abnormally enlarged pelvic lymph nodes are identified.      BOWEL:  Unremarkable.      BONES:  No focal lesions are noted in the bone.      IMPRESSION:  1.  T2 hypointense asymmetric thickening of the left round ligament.  Findings are nonspecific with deep infiltrating endometriosis not  excluded. However, no definite evidence of nodules or plaques in the  pelvis.  2. Note is made of a small subserosal fibroid in the anterior uterine  wall. Otherwise, grossly unremarkable uterine MRI    HYSTEROSCOPY:10/28/2024  Findings:   Cavity: Smooth cavity no lesions noted, findings suggestive of endometritis  Ostia: Bilateral tubal ostia visualized  Additional Notes:  doxycycline 100 mg bid x 14 days sent to patient's pharmacy          4/1/2024 Pap Smear (Q 5 Years) NEGATIVE FOR INTRAEPITHELIAL LESION OR MALIGNANCY  Endometrial cells are present   The presence of endometrial cells is reported at age 45 or older.  Their presence, particularly out of phase or after menopause,   may be associated with benign endometrium, hormonal alterations and less commonly, endometrial/uterine abnormalities.  Clinical correlation is recommended.     HPV: Negative    12/2023  METRO Mammogram ( > 40) (Q 1 Year) NORMAL- repeat 1 yr   due December 14 2024   Date Done Miscellaneous Results/Comments     BMI  Checklist  BMI > 40 or < 18 Added to chart:   No    2024 >= 45 Checklist  Added to chart:   YES  Boarding Pass 45 and Older Checklist    Date Done Testing Results   5/4/2024 CBC Plt: 351 (Ref range: 150 - 450 x10*3/uL)  Hct: 46.1 (H; Ref range: 36.0 - 46.0 %)   5/4/2024 CMP BUN: 9 (Ref range: 6 - 23 mg/dL)  Cre: 0.69 (Ref range: 0.50 - 1.05 mg/dL)  AST: 15 (Ref range: 9 - 39 U/L)  ALT: 17 (Ref range: 7 - 45 U/L)   4/22/2024 Lipid Panel Cholesterol: 212 (H; Ref range: 0 - 199 mg/dL)  HDL: 45.7  Cholesterol/HDL Ratio: 4.6  LDL: 150 (H; Ref range: <=99 mg/dL)  VLDL: 17 (Ref range: 0 - 40 mg/dL)  Triglycerides: 84 (Ref range: 0 - 149 mg/dL)  Non HDL Cholesterol: 166 (H; Ref range: 0 - 149 mg/dL)   4/22/2024 HgbA1C 5.2 (Ref range: see below %)   9/3/2024 TSH (with reflex to T4) TSH: 0.91 (Ref range: 0.44 - 3.98 mIU/L)  T4: No results found for requested labs within last 365 days.   5/6/2024 EKG Sinus Rhythm    12/14/2023 Mammogram ( > 40) (Q 1 Year) NORMAL- repeat 1 yr     12/20/23 MFM Clearance Clearance Letter-Provider Reviewed: TA    PCP/Internal Medicine Clearance (if required) Clearance Letter-Provider Reviewed: n/a    Colonoscopy No results found for this or any previous visit.    Transfer of Care (when pregnant) MFM      **Does not need to be completed prior to placing on IVF calendar**  Reviewed and approved by JOHN BRIONES on 12/13/23 at 12:51 PM.  9/3/2024 per RW:   MD Completion:  Ectopic Risk: No  Medically Complex: No    Approved to proceed with FET  Jaymie Chong 11/06/24 3:05 PM

## 2024-01-23 NOTE — PROGRESS NOTES
Patient called with menses for MOCK CYCLE RECEPTIVA ( AND BCL6) AND ERA    LMP: 1/20  Plan: Estrace patches, IM P4 100 mg+ vaginal prometrium 200 mg BID (starting 1 day after IM P4)   Tentative lining check: 2/2  Tentative progesterone start: 2/4  Tentative biopsy date: 2/9  Number of days of progesterone for transfer/biopsy: 6  Embryo # confirmed: n/a (7 donor egg embryos remaining)  Embryo # to transfer: n/a      01/23/24 at 10:14 AM - Catie Melchor RN      Plan for ERA and Receptiva (BCL6 and )  Reviewed and approved by SAMANTA HILLIARD on 1/23/24 at 12:54 PM.

## 2024-01-26 ENCOUNTER — SPECIALTY PHARMACY (OUTPATIENT)
Dept: PHARMACY | Facility: CLINIC | Age: 44
End: 2024-01-26

## 2024-01-26 ENCOUNTER — PHARMACY VISIT (OUTPATIENT)
Dept: PHARMACY | Facility: CLINIC | Age: 44
End: 2024-01-26
Payer: MEDICARE

## 2024-01-26 PROCEDURE — RXMED WILLOW AMBULATORY MEDICATION CHARGE

## 2024-02-02 ENCOUNTER — ANCILLARY PROCEDURE (OUTPATIENT)
Dept: ENDOCRINOLOGY | Facility: CLINIC | Age: 44
End: 2024-02-02
Payer: COMMERCIAL

## 2024-02-02 DIAGNOSIS — N97.9 FEMALE INFERTILITY: ICD-10-CM

## 2024-02-02 LAB
ESTRADIOL SERPL-MCNC: 966 PG/ML
PROGEST SERPL-MCNC: 0.1 NG/ML

## 2024-02-02 PROCEDURE — 76857 US EXAM PELVIC LIMITED: CPT | Performed by: OBSTETRICS & GYNECOLOGY

## 2024-02-02 PROCEDURE — 76857 US EXAM PELVIC LIMITED: CPT

## 2024-02-02 PROCEDURE — 82670 ASSAY OF TOTAL ESTRADIOL: CPT

## 2024-02-02 PROCEDURE — 84144 ASSAY OF PROGESTERONE: CPT

## 2024-02-02 PROCEDURE — 36415 COLL VENOUS BLD VENIPUNCTURE: CPT

## 2024-02-02 NOTE — PROGRESS NOTES
[facility] PROVIDER NOTE  Ultrasound and/or labs reviewed at [facility].   Results for orders placed or performed in visit on 02/02/24 (from the past 96 hour(s))   Estradiol   Result Value Ref Range    Estradiol 966 pg/mL   Progesterone   Result Value Ref Range    Progesterone 0.1 ng/mL     Sat 2/3 (Day 15)   estradiol patch: Place 3 patch every other day on the skin    Sun 2/4 (Day 16)   progesterone injection: Inject 100 mg once daily into the muscle    Mon 2/5 (Day 17)   progesterone capsule: Insert 200 mg 2 times a day into the vagina   progesterone injection: Inject 100 mg once daily into the muscle   estradiol patch: Place 3 patch every other day on the skin    Tue 2/6 (Day 18)   progesterone capsule: Insert 200 mg 2 times a day into the vagina   progesterone injection: Inject 100 mg once daily into the muscle    Wed 2/7 (Day 19)   progesterone capsule: Insert 200 mg 2 times a day into the vagina   progesterone injection: Inject 100 mg once daily into the muscle    Thu 2/8 (Day 20)   progesterone capsule: Insert 200 mg 2 times a day into the vagina   progesterone injection: Inject 100 mg once daily into the muscle   estradiol patch: Place 3 patch every other day on the skin    Fri 2/9 (Day 21)   progesterone capsule: Insert 200 mg 2 times a day into the vagina   progesterone injection: Inject 100 mg once daily into the muscle    RTC in  2/9  for  Endometrial biopsy  and  No labs .   Jaymie Chong  02/02/2024  1:20 PM    TC to pt  - LM for pt with detailed medication protocol and appointment for 3:00 PM on Friday 2/9  for EMB; does not need  blood work that day. Please call back if any questions.    02/02/24 at 2:07 PM - Rosie Rivero RN

## 2024-02-05 ENCOUNTER — SPECIALTY PHARMACY (OUTPATIENT)
Dept: PHARMACY | Facility: CLINIC | Age: 44
End: 2024-02-05

## 2024-02-06 ENCOUNTER — PHARMACY VISIT (OUTPATIENT)
Dept: PHARMACY | Facility: CLINIC | Age: 44
End: 2024-02-06
Payer: MEDICARE

## 2024-02-06 PROCEDURE — RXMED WILLOW AMBULATORY MEDICATION CHARGE

## 2024-02-09 ENCOUNTER — PROCEDURE VISIT (OUTPATIENT)
Dept: ENDOCRINOLOGY | Facility: CLINIC | Age: 44
End: 2024-02-09
Payer: COMMERCIAL

## 2024-02-09 DIAGNOSIS — Z31.41 FERTILITY TESTING: Primary | ICD-10-CM

## 2024-02-09 PROCEDURE — 58100 BIOPSY OF UTERUS LINING: CPT | Performed by: NURSE PRACTITIONER

## 2024-02-09 NOTE — PROGRESS NOTES
Urine pregancy test negative in office today prior to EMB.    02/09/24 at 4:06 PM - Rosie Rivero RN

## 2024-02-15 DIAGNOSIS — F41.9 ANXIETY: ICD-10-CM

## 2024-02-15 RX ORDER — HYDROXYZINE HYDROCHLORIDE 25 MG/1
25 TABLET, FILM COATED ORAL 2 TIMES DAILY
Qty: 180 TABLET | Refills: 3 | Status: SHIPPED | OUTPATIENT
Start: 2024-02-15 | End: 2024-06-10 | Stop reason: WASHOUT

## 2024-02-16 ENCOUNTER — TELEPHONE (OUTPATIENT)
Dept: ENDOCRINOLOGY | Facility: CLINIC | Age: 44
End: 2024-02-16
Payer: COMMERCIAL

## 2024-02-16 DIAGNOSIS — N93.9 ABNORMAL UTERINE BLEEDING: Primary | ICD-10-CM

## 2024-02-16 NOTE — TELEPHONE ENCOUNTER
TC to pt  - asking if needs to fast for her lab work - yes, 12 hours for her Lipid panel. She will plan to go next week.    02/16/24 at 10:14 AM - Rosie Rivero RN

## 2024-02-19 ENCOUNTER — TELEPHONE (OUTPATIENT)
Dept: ENDOCRINOLOGY | Facility: CLINIC | Age: 44
End: 2024-02-19
Payer: COMMERCIAL

## 2024-02-19 DIAGNOSIS — Z01.812 ENCOUNTER FOR PREPROCEDURAL LABORATORY EXAMINATION: ICD-10-CM

## 2024-02-19 DIAGNOSIS — Z31.41 FERTILITY TESTING: Primary | ICD-10-CM

## 2024-02-19 NOTE — TELEPHONE ENCOUNTER
CAROLINE PT  PT RECEIVED 2 SEPARATE E MAILS W CONTACT INFORMATION REGARDING A BIOPSY AND SHE WOULD LIKE TO KNOW WHICH ONE IS ACCURATE BC THEY BOTH CONTAIN DIFFERENT CONTACT INFORMATION

## 2024-02-19 NOTE — PROGRESS NOTES
Procedure: Endometrial Biopsy    Pt is a 44 year old female presenting today for endometrial biopsy.     The cervix was stabilized with a single toothed tenaculum. The cervix required no dilation. The endometrial biopsy was completed using standard technique. An adequate amount of blood and tissue were obtained.     Biopsy complete pt tolerated well without complications. Samples sent to [facility] and [facility].

## 2024-02-22 NOTE — TELEPHONE ENCOUNTER
Called patient to review Receptiva results    +BCL6  Negative     ERA pending    Reviewed options with patient-  Laparoscopy  Empiric treatment with Lupron- reviewed this may not make sense in her situation given POI.   Can consider no HRT x 2-3 months or Aygestin   HSG for tubal assessment    Plan:  HSG  Await ERA results  Aygestin  x 2 months  For next transfer will likely start Plaquenil and Prednisone as recommended by Rheumatologist     SAMANTA HILLIARD on 2/22/24 at 2:08 PM.

## 2024-03-13 ENCOUNTER — OFFICE VISIT (OUTPATIENT)
Dept: PRIMARY CARE | Facility: CLINIC | Age: 44
End: 2024-03-13
Payer: COMMERCIAL

## 2024-03-13 VITALS
SYSTOLIC BLOOD PRESSURE: 127 MMHG | HEART RATE: 92 BPM | BODY MASS INDEX: 28.08 KG/M2 | WEIGHT: 163.6 LBS | OXYGEN SATURATION: 95 % | DIASTOLIC BLOOD PRESSURE: 89 MMHG

## 2024-03-13 DIAGNOSIS — D17.24 LIPOMA OF LEFT LOWER EXTREMITY: Primary | ICD-10-CM

## 2024-03-13 DIAGNOSIS — M35.9 UNDIFFERENTIATED CONNECTIVE TISSUE DISEASE (MULTI): ICD-10-CM

## 2024-03-13 DIAGNOSIS — F54 PSYCHOLOGICAL AND BEHAVIORAL FACTORS ASSOCIATED WITH DISORDERS OR DISEASES CLASSIFIED ELSEWHERE: ICD-10-CM

## 2024-03-13 PROBLEM — E66.9 OBESITY: Status: ACTIVE | Noted: 2024-03-13

## 2024-03-13 PROBLEM — Z86.19 HISTORY OF INFECTION DUE TO HUMAN PAPILLOMA VIRUS (HPV): Status: ACTIVE | Noted: 2024-03-13

## 2024-03-13 PROBLEM — M25.50 ARTHRALGIA: Status: ACTIVE | Noted: 2024-03-13

## 2024-03-13 PROBLEM — R20.2 TINGLING OF SKIN: Status: ACTIVE | Noted: 2023-06-21

## 2024-03-13 PROBLEM — Z86.39 HISTORY OF ELEVATED LIPIDS: Status: ACTIVE | Noted: 2024-03-13

## 2024-03-13 PROBLEM — B97.7 HUMAN PAPILLOMA VIRUS (HPV) INFECTION: Status: ACTIVE | Noted: 2024-03-13

## 2024-03-13 PROCEDURE — 1036F TOBACCO NON-USER: CPT | Performed by: INTERNAL MEDICINE

## 2024-03-13 PROCEDURE — 99214 OFFICE O/P EST MOD 30 MIN: CPT | Performed by: INTERNAL MEDICINE

## 2024-03-13 ASSESSMENT — PATIENT HEALTH QUESTIONNAIRE - PHQ9
10. IF YOU CHECKED OFF ANY PROBLEMS, HOW DIFFICULT HAVE THESE PROBLEMS MADE IT FOR YOU TO DO YOUR WORK, TAKE CARE OF THINGS AT HOME, OR GET ALONG WITH OTHER PEOPLE: NOT DIFFICULT AT ALL
1. LITTLE INTEREST OR PLEASURE IN DOING THINGS: SEVERAL DAYS
SUM OF ALL RESPONSES TO PHQ9 QUESTIONS 1 AND 2: 2
2. FEELING DOWN, DEPRESSED OR HOPELESS: SEVERAL DAYS

## 2024-03-13 NOTE — PROGRESS NOTES
Subjective   Patient ID: Jocelyn Dee is a 44 y.o. female who presents for Lump behind leg  (Patient complains of a lump behind leg leg that is not painful.).    Pt noted a small lump back of her left thigh,no pain.    She has unchanged tingling legs,was seen by neurologist Dr Virgen and , Dr Dong and lately by Dr Chavez at [facility] and an I.D,had work up.records reviewed,who is recommending for her to start Plaquanil(told her OK to use for pregnant women).  Pt continue to be  seen at fertility clinic and has been feeling overwhelmed,has an egg donor,she is seeing their counselor,has upcoming appt this Thursday and will discuss her anxiety and to possibly starting Sertraline(which worked for her in past).  She has been working at [employer],working 4 hours per day because unable to stand up for 8 hours.  She c/o neck pain,tingling of left arm and left sciatica.had  PT and massage therapy.         Review of Systems   Neurological:         As HPI       Objective   /89 (BP Location: Left arm, Patient Position: Sitting, BP Cuff Size: Large adult)   Pulse 92   Wt 74.2 kg (163 lb 9.6 oz)   SpO2 95%   BMI 28.08 kg/m²     Physical Exam  Constitutional:       Appearance: Normal appearance.   HENT:      Head: Normocephalic and atraumatic.   Eyes:      Extraocular Movements: Extraocular movements intact.      Pupils: Pupils are equal, round, and reactive to light.   Cardiovascular:      Rate and Rhythm: Normal rate and regular rhythm.      Heart sounds: Normal heart sounds.   Pulmonary:      Effort: Pulmonary effort is normal.      Breath sounds: Normal breath sounds. No wheezing or rhonchi.   Abdominal:      General: Abdomen is flat. There is no distension.      Palpations: Abdomen is soft.   Musculoskeletal:      Cervical back: Normal range of motion and neck supple.      Right lower leg: No edema.      Left lower leg: No edema.      Comments: No calf or thigh tenderness with  palpation.  Suspects small tiny lipoma right posterior lower thigh, no pain with palpation.   Neurological:      General: No focal deficit present.      Mental Status: She is alert and oriented to person, place, and time.   Psychiatric:         Mood and Affect: Mood normal.      Comments: Worried and anxious         Assessment/Plan   Problem List Items Addressed This Visit             ICD-10-CM    Psychological and behavioral factors associated with disorders or diseases classified elsewhere F54     Patient will be talking to the counselor at fertility clinic.  Consider starting sertraline.         Undifferentiated connective tissue disease (CMS/HCC) M35.9     Managed by rheumatologist might start Plaquenil soon.         Lipoma of left lower extremity - Primary D17.24     Patient was reassured continue to monitor area.

## 2024-03-14 ENCOUNTER — TELEMEDICINE (OUTPATIENT)
Dept: BEHAVIORAL HEALTH | Facility: CLINIC | Age: 44
End: 2024-03-14
Payer: COMMERCIAL

## 2024-03-14 DIAGNOSIS — F43.23 ADJUSTMENT DISORDER WITH MIXED ANXIETY AND DEPRESSED MOOD: Primary | ICD-10-CM

## 2024-03-14 PROBLEM — D17.24 LIPOMA OF LEFT LOWER EXTREMITY: Status: ACTIVE | Noted: 2024-03-14

## 2024-03-14 PROCEDURE — 90834 PSYTX W PT 45 MINUTES: CPT | Performed by: PSYCHOLOGIST

## 2024-03-14 NOTE — PROGRESS NOTES
Start Time: 5  End Time: 5:45  Documentation time 5    Focus of treatment:   Modality of treatment: CBT  Frequency of treatment: every 2-4 weeks    Diagnosis: adjustment disorder    Treatment plan: cbt    Symptoms: hot flashes, anxiety, rumination    Prognosis: very good    Progress to date: First session since consult she has POI and is perimenopausal and having symptoms of menopause, and anxious about infertility and treatments along with worries about autoimmune disorders.  Worked with her to reduce her all or nothing thinking and her negative hypothesis guided searches.  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

## 2024-03-15 DIAGNOSIS — F41.1 GENERALIZED ANXIETY DISORDER: Primary | ICD-10-CM

## 2024-03-15 RX ORDER — SERTRALINE HYDROCHLORIDE 25 MG/1
25 TABLET, FILM COATED ORAL DAILY
Qty: 30 TABLET | Refills: 1 | Status: SHIPPED | OUTPATIENT
Start: 2024-03-15 | End: 2024-04-02 | Stop reason: DRUGHIGH

## 2024-03-29 ENCOUNTER — TELEMEDICINE (OUTPATIENT)
Dept: BEHAVIORAL HEALTH | Facility: CLINIC | Age: 44
End: 2024-03-29
Payer: COMMERCIAL

## 2024-03-29 DIAGNOSIS — F41.8 ANXIETY ABOUT HEALTH: ICD-10-CM

## 2024-03-29 DIAGNOSIS — F41.9 ANXIETY: Primary | ICD-10-CM

## 2024-03-29 PROCEDURE — 90837 PSYTX W PT 60 MINUTES: CPT | Performed by: PSYCHOLOGIST

## 2024-03-29 NOTE — PROGRESS NOTES
Start Time: 9  End Time: 9:55  Documentation time 5    Focus of treatment:   Modality of treatment: CBT  Frequency of treatment: monthly    Diagnosis: anxiety and anxiety about health    Treatment plan: cbt    Symptoms: anxiety, rumination, hsdd, physcial symptoms    Prognosis: very good    Progress to date: PCP started her on 25 zoloft and she has improved--may talk to her about increase to 50 which she was on before. Ability to challenge her catastrophic thinking is easier. Focus today on HSDD. She has noticed sig drop in drive that may be related to her anxiety, to perimenopause and to worry about health (symptoms she worries may be MS).  Donovan is hurt and now she is the initiator but I gave education about loss of drive and discrepant desire. Sent 2 papers and rec 2 books and will see the couple. Also referred her to Lupe Barragan for possible IC symptoms.

## 2024-04-02 ENCOUNTER — PATIENT MESSAGE (OUTPATIENT)
Dept: PRIMARY CARE | Facility: CLINIC | Age: 44
End: 2024-04-02

## 2024-04-02 ENCOUNTER — CONSULT (OUTPATIENT)
Dept: ALLERGY | Facility: CLINIC | Age: 44
End: 2024-04-02
Payer: COMMERCIAL

## 2024-04-02 VITALS
BODY MASS INDEX: 27.83 KG/M2 | DIASTOLIC BLOOD PRESSURE: 74 MMHG | TEMPERATURE: 97.6 F | HEIGHT: 64 IN | HEART RATE: 76 BPM | RESPIRATION RATE: 16 BRPM | SYSTOLIC BLOOD PRESSURE: 110 MMHG | WEIGHT: 163 LBS | OXYGEN SATURATION: 96 %

## 2024-04-02 DIAGNOSIS — Z12.11 SCREENING FOR COLON CANCER: Primary | ICD-10-CM

## 2024-04-02 DIAGNOSIS — B99.9 CHRONIC INFECTION: ICD-10-CM

## 2024-04-02 DIAGNOSIS — J32.4 CHRONIC PANSINUSITIS: Primary | ICD-10-CM

## 2024-04-02 DIAGNOSIS — F41.9 ANXIETY: Primary | ICD-10-CM

## 2024-04-02 PROCEDURE — 99204 OFFICE O/P NEW MOD 45 MIN: CPT | Performed by: ALLERGY & IMMUNOLOGY

## 2024-04-02 RX ORDER — SERTRALINE HYDROCHLORIDE 50 MG/1
50 TABLET, FILM COATED ORAL DAILY
Qty: 30 TABLET | Refills: 1 | Status: SHIPPED | OUTPATIENT
Start: 2024-04-02 | End: 2024-05-31 | Stop reason: SDUPTHER

## 2024-04-02 NOTE — PROGRESS NOTES
Patient ID: Jocelyn Dee is a 44 y.o. female.     Chief Complaint: follow up  History Of Present Illness  Jocelyn Dee is a 44 y.o. female with PMx concerns for immune issues presenting for consultation. Self referred.     Patient has had some failed IVF and would like to have evaluation for this.    Food Allergy  No  Onions cause her mouth to feel dry  Has a history of celiac gene carrier.  Feels better when she does not consume gluten    Eczema/ Atopic Dermatitis  As a child had eczema    Asthma  As a child had mild asthma, no symptoms now    Rhinoconjunctivitis  History of sinus issues  Tonsils out at 36 yo, but feels since then has had more sinus issues.  Recent sinus infection over the past winter.  C/O sinus pain, pnd.    Drug Allergy   None reported    Insect Allergy   No    Infections  No history of frequent or recurrent infections      Review of Systems    Pertinent positives and negatives have been assessed in the HPI. All other systems have been reviewed and are negative except as noted in the HPI.    Allergies  Patient has no known allergies.    Past Medical History  She has a past medical history of Papillomavirus as the cause of diseases classified elsewhere, Personal history of other complications of pregnancy, childbirth and the puerperium, Personal history of other diseases of the female genital tract, and Personal history of other infectious and parasitic diseases.    Family History  Family History   Problem Relation Name Age of Onset    Breast cancer Mother      Diabetes Other      Drug abuse Other      Heart disease Other         No history of food allergy or atopic disease in the family.    Surgical History  She has a past surgical history that includes Other surgical history (01/11/2022); Other surgical history (01/11/2022); and Other surgical history (04/07/2022).    Social/Environmental History  She reports that she has never smoked. She has never used smokeless tobacco. She reports  current alcohol use. She reports that she does not use drugs.    Home: Lives in a house   Floors: Wood and carpeting mixed  Air Conditioning: Central  Smoker: No Mother smoked growing up.  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    MEDICATIONS  Current Outpatient Medications on File Prior to Visit   Medication Sig Dispense Refill    cholecalciferol (Vitamin D-3) 25 MCG (1000 UT) capsule Take 1 capsule (25 mcg) by mouth once daily.      hydrOXYzine HCL (Atarax) 25 mg tablet Take 1 tablet (25 mg) by mouth 2 times a day. (Patient taking differently: Take 1 tablet (25 mg) by mouth 2 times a day as needed.) 180 tablet 3    levothyroxine (Synthroid, Levoxyl) 50 mcg tablet Take 1 tablet (50 mcg) by mouth once daily. 30 tablet 2    LORazepam (Ativan) 0.5 mg tablet Take 1 tablet (0.5 mg) by mouth if needed for anxiety (flight anxiety; take prior to flight) for up to 6 doses. 6 tablet 0    prenat.vits,shellie,min-iron-folic tablet Take 1 tablet by mouth once daily.      Soolantra 1 % cream 1 Application once daily. APPLY TOPICALLY TO FACE 1 TIME IN THE MORNING      fluticasone (Flonase) 50 mcg/actuation nasal spray Administer 1 spray into each nostril once daily. Shake gently. Before first use, prime pump. After use, clean tip and replace cap. (Patient not taking: Reported on 2024) 16 g 5    lidocaine-prilocaine (Emla) 2.5-2.5 % cream APPLY TO TREATMENT AREA 1 HOUR PRIOR TO INJECTION. (Patient not taking: Reported on 2024) 60 g 2    progesterone (Prometrium) 200 mg capsule Take 1 capsule (200 mg) by mouth. TAKE 1 CAPSULE BY MOUTH EVERY DAY FOR THE FIRST 12 DAYS OF EACH MONTH      [] progesterone (Prometrium) 200 mg capsule Insert 1 capsule (200 mg) into the vagina 2 times a day. 60 capsule 2    progesterone 50 mg/mL injection DRAW UP 2 ML (100 MG) AND INJECT INTO THE MUSCLE AT THE SAME TIME EACH MORNING AS DIRECTED PER PROVIDER. (Patient not  "taking: Reported on 2024) 60 mL 2    [] progesterone 50 mg/mL injection Inject 2 mL (100 mg) into the muscle once daily. Draw up 1.5 mL (75mg) and inject into the muscle at the same time each morning as directed per provider. 50 mL 3    [DISCONTINUED] sertraline (Zoloft) 25 mg tablet Take 1 tablet (25 mg) by mouth once daily. 30 tablet 1     No current facility-administered medications on file prior to visit.         Physical Exam  Visit Vitals  Ht 1.626 m (5' 4\")   Wt 73.9 kg (163 lb)   BMI 27.98 kg/m²   Smoking Status Never   BSA 1.83 m²       Wt Readings from Last 1 Encounters:   24 73.9 kg (163 lb)       Physical Exam    General: Well appearing, no acute distress  Head: Normocephalic, atraumatic, neck supple without lymphadenopathy  Eyes: EOMI, non-injected  Ears: nml Tm's  Nose: No nasal crease, nares patent,minimal discharge  Throat: Normal dentition, no erythema  Heart: Regular rate and rhythm  Lungs: Clear to auscultation bilaterally, effort normal  Abdomen: Soft, non-tender, normal bowel sounds  Extremities: Moves all extremities symmetrically, no edema  Skin: No rashes/lesions  Psych: normal mood and affect    LAB RESULTS:  CBC:  Recent Labs     23  1140   WBC 5.8   HGB 14.7   HCT 43.0      MCV 93   EOSABS 0.09       CMP:  Recent Labs     23  1140      K 3.7      CO2 27   ANIONGAP 12   BUN 8   CREATININE 0.66   MG 2.30     Recent Labs     23  1140   ALBUMIN 4.4   ALKPHOS 44   ALT 24   AST 25   BILITOT 0.5         Recent Labs     23  1140   EOSABS 0.09       ABO:   Recent Labs     23  1531   ABO A       HEME/ENDO:  Recent Labs     23  0913 23  1714 23  1547 22  1727 22  1040   TSH 1.54 1.05 CANCELED   < >  --    HGBA1C  --   --   --   --  5.4    < > = values in this interval not displayed.         Assessment/Plan   Assessment:  Dayana is a 44 you with a history of infertility who desires an immune evaluation " as she explores reasons for infertility. She does report chronic sinusitis.      Plan:    Obtain labs and I will call results in order to determine follow up.      Valeri Newton, DO

## 2024-04-03 ENCOUNTER — OFFICE VISIT (OUTPATIENT)
Dept: UROLOGY | Facility: CLINIC | Age: 44
End: 2024-04-03
Payer: COMMERCIAL

## 2024-04-03 VITALS
HEART RATE: 92 BPM | WEIGHT: 163 LBS | BODY MASS INDEX: 27.98 KG/M2 | SYSTOLIC BLOOD PRESSURE: 126 MMHG | DIASTOLIC BLOOD PRESSURE: 78 MMHG | TEMPERATURE: 97.7 F

## 2024-04-03 DIAGNOSIS — N32.89 BLADDER SPASMS: ICD-10-CM

## 2024-04-03 DIAGNOSIS — R30.0 DYSURIA: Primary | ICD-10-CM

## 2024-04-03 DIAGNOSIS — M62.89 HIGH-TONE PELVIC FLOOR DYSFUNCTION IN FEMALE: ICD-10-CM

## 2024-04-03 DIAGNOSIS — N95.8 GENITOURINARY SYNDROME OF MENOPAUSE: ICD-10-CM

## 2024-04-03 DIAGNOSIS — E28.319 EARLY MENOPAUSE: Primary | ICD-10-CM

## 2024-04-03 LAB
POC APPEARANCE, URINE: CLEAR
POC BILIRUBIN, URINE: NEGATIVE
POC BLOOD, URINE: NEGATIVE
POC COLOR, URINE: YELLOW
POC GLUCOSE, URINE: NEGATIVE MG/DL
POC KETONES, URINE: NEGATIVE MG/DL
POC LEUKOCYTES, URINE: ABNORMAL
POC NITRITE,URINE: NEGATIVE
POC PH, URINE: 7.5 PH
POC PROTEIN, URINE: NEGATIVE MG/DL
POC SPECIFIC GRAVITY, URINE: 1.01
POC UROBILINOGEN, URINE: 0.2 EU/DL

## 2024-04-03 PROCEDURE — 81003 URINALYSIS AUTO W/O SCOPE: CPT | Performed by: NURSE PRACTITIONER

## 2024-04-03 PROCEDURE — 99204 OFFICE O/P NEW MOD 45 MIN: CPT | Performed by: NURSE PRACTITIONER

## 2024-04-03 PROCEDURE — 1036F TOBACCO NON-USER: CPT | Performed by: NURSE PRACTITIONER

## 2024-04-03 RX ORDER — METHENAMINE, SODIUM PHOSPHATE, MONOBASIC, MONOHYDRATE, PHENYL SALICYLATE, METHYLENE BLUE, AND HYOSCYAMINE SULFATE 118; 40.8; 36; 10; .12 MG/1; MG/1; MG/1; MG/1; MG/1
1 CAPSULE ORAL 3 TIMES DAILY PRN
Qty: 30 CAPSULE | Refills: 2 | Status: SHIPPED | OUTPATIENT
Start: 2024-04-03

## 2024-04-03 RX ORDER — ESTRADIOL 0.1 MG/G
CREAM VAGINAL
Qty: 42.5 G | Refills: 5 | Status: SHIPPED | OUTPATIENT
Start: 2024-04-03 | End: 2024-06-10 | Stop reason: WASHOUT

## 2024-04-03 NOTE — PATIENT INSTRUCTIONS
Pelvic floor physical therapy Alia Cm or colleague high tone pelvic floor  Estrogen cream   Uribel as needed up to 3 x per day, would like just once a day or none only if needed  Discussed can't use when trying to get pregnant but not planning on pregnancy for another few mos  Monitor bladder irritants as discussed  Nurse line [phone]

## 2024-04-03 NOTE — PROGRESS NOTES
"04/03/24   [id number]    Chief Complaint   Patient presents with    Bladder Problem      Subjective      HPI Jocelyn Dee is a 44 y.o. female who presents for urinary frequency. Symptoms of UTI pinchy in bladder urethra area; After last round of IVF brought it on, menses used to make sensitive, some bladder irritants; trying to get pregnant now; taking a break;     Kindly referred by Dr. Hartley, seeing her w IVF    Often sx of UTI but culture negative  DTF 6 x, NTF 1 x, \"hurt\"  Constipation intermittently, IBS  No urgency typically, no UUI, no bothersome PAKO  Dysparuenia w urethra sensitivity    Birth control pills in past intermittently    Now in menopause per patient, estrogen patches and progesterone injections and inserts;     PVR 72 ml, UA trace leuk    Orgasm can almost be painful from urethra  Now on zoloft so wonders    PMH thyroid, infertility, anxiety, disc issues  PSH tonsils  FH no urological or female cancers  SH never smoked, works as , previously ;         Objective     /78   Pulse 92   Temp 36.5 °C (97.7 °F)   Wt 73.9 kg (163 lb)   BMI 27.98 kg/m²    Physical Exam  Genitourinary:     Comments: No vulvar lesions, neg Qtip tenderness, mild atrophy, + tenderness at clitoris  Neg CST lying down, Positive levator ani tenderness or tightness Left> right  Mild cystocele, no rectocele or uterine prolapse  Non tender ovaries/uterus, difficult exam d/t  body habitus   No rectal exam done        General: Appears comfortable and in no apparent distress, well nourished  Head: Normocephalic, atraumatic  Neck: trachea midline  Respiratory: respirations unlabored, no wheezes, and no use of accessory muscles  Cardiovascular: at rest no dyspnea, well perfused  Skin: no visible rashes or lesions  Neurologic: grossly intact, oriented to person, place, and time  Psychiatric: mood and affect appropriate  Musculoskeletal: in chair for appt. no difficulty w upper body " movement        Assessment/Plan   Problem List Items Addressed This Visit    None  Visit Diagnoses       Dysuria    -  Primary    Relevant Orders    Post-Void Residual (Completed)    POCT UA Automated manually resulted (Completed)    Bladder spasms              Orders Placed This Encounter   Procedures    Post-Void Residual    POCT UA Automated manually resulted     Order Specific Question:   Release result to [facility]     Answer:   Immediate [1]             Lupe L Barragan, APRN-CNP  Lab Results   Component Value Date    GLUCOSE 85 01/11/2023    CALCIUM 9.3 01/11/2023     01/11/2023    K 3.7 01/11/2023    CO2 27 01/11/2023     01/11/2023    BUN 8 01/11/2023    CREATININE 0.66 01/11/2023

## 2024-04-09 ENCOUNTER — TELEPHONE (OUTPATIENT)
Dept: UROLOGY | Facility: CLINIC | Age: 44
End: 2024-04-09
Payer: COMMERCIAL

## 2024-04-09 NOTE — TELEPHONE ENCOUNTER
Loc, pharmacist from [facility] #[phone] left message to get approval from you in reference to the uribel. Patient currently on [place] Blue and Sertraline which is contraindicated SSRI.  Saved message on voicemail.  Thanks!

## 2024-04-10 NOTE — TELEPHONE ENCOUNTER
Per Lupe verbal, ok to take PRN with SSRI. Called pharmacy and spoke with Loc the pharmacist and advised him to fill the Uribel rx.

## 2024-04-12 ENCOUNTER — TELEPHONE (OUTPATIENT)
Dept: ENDOCRINOLOGY | Facility: CLINIC | Age: 44
End: 2024-04-12
Payer: COMMERCIAL

## 2024-04-12 ENCOUNTER — DOCUMENTATION (OUTPATIENT)
Dept: ENDOCRINOLOGY | Facility: CLINIC | Age: 44
End: 2024-04-12
Payer: COMMERCIAL

## 2024-04-12 DIAGNOSIS — E03.8 SUBCLINICAL HYPOTHYROIDISM: ICD-10-CM

## 2024-04-12 RX ORDER — LEVOTHYROXINE SODIUM 50 UG/1
50 TABLET ORAL DAILY
Qty: 30 TABLET | Refills: 2 | Status: SHIPPED | OUTPATIENT
Start: 2024-04-12 | End: 2024-07-11

## 2024-04-12 NOTE — TELEPHONE ENCOUNTER
Spoke to pt - was prescribed a vaginal cream by a urologist and wanted to  make sure OK with DR. Chong. Also need Synthroid refill. Note to Dr. Nelson re: both.  Rosie Rivero 04/12/24 12:15 PM

## 2024-04-12 NOTE — TELEPHONE ENCOUNTER
Reason for call: Call Back  Notes: Pt would like a call from RN. She has questions regarding her new medications and testing.

## 2024-04-12 NOTE — PROGRESS NOTES
"TC to pt to review MD response re: vaginal cream. She will let us know what she decides to do re: taking it or not and if taking, is she getting relief.  Rosie Rivero 04/12/24 2:10 PM      MD Rosie Swartz RN  It's tricky since we are trying to limit estrogen right now due her +BCL6.  She can do a small amount a few times a week but if she can wait until after her transfer even better          Previous Messages       ----- Message -----  From: Rosie Rivero RN  Sent: 4/12/2024  12:14 PM EDT  To: Jaymie Chong MD  Subject: Med questions                                    Hi,  I spoke with Laura today. She went to see a urologist as she often feels like she has a UTI;  The NP that she saw prescribed:    \"estradiol (Estrace) 0.01 % (0.1 mg/gram) vaginal cream [[id number]]    Order Details  Dose, Route, Frequency: As Directed  Dispense Quantity: 42.5 g Refills: 5     Sig: Apply daily 1 gram apply for vagina for 3 weeks, then 3 times per week.  "

## 2024-04-17 ENCOUNTER — HOSPITAL ENCOUNTER (OUTPATIENT)
Dept: RADIOLOGY | Facility: CLINIC | Age: 44
Discharge: HOME | End: 2024-04-17
Payer: COMMERCIAL

## 2024-04-17 DIAGNOSIS — E28.319 EARLY MENOPAUSE: ICD-10-CM

## 2024-04-17 PROCEDURE — 77080 DXA BONE DENSITY AXIAL: CPT

## 2024-04-18 DIAGNOSIS — Z12.11 COLON CANCER SCREENING: Primary | ICD-10-CM

## 2024-04-18 RX ORDER — POLYETHYLENE GLYCOL 3350, SODIUM SULFATE, SODIUM CHLORIDE, POTASSIUM CHLORIDE, SODIUM ASCORBATE, AND ASCORBIC ACID 7.5-2.691G
KIT ORAL
Qty: 1 EACH | Refills: 0 | Status: SHIPPED | OUTPATIENT
Start: 2024-04-18 | End: 2024-06-10 | Stop reason: WASHOUT

## 2024-04-19 ENCOUNTER — OFFICE VISIT (OUTPATIENT)
Dept: PRIMARY CARE | Facility: CLINIC | Age: 44
End: 2024-04-19
Payer: COMMERCIAL

## 2024-04-19 ENCOUNTER — TELEPHONE (OUTPATIENT)
Dept: SCHEDULING | Age: 44
End: 2024-04-19

## 2024-04-19 VITALS
TEMPERATURE: 97.6 F | WEIGHT: 162.6 LBS | HEART RATE: 77 BPM | SYSTOLIC BLOOD PRESSURE: 128 MMHG | OXYGEN SATURATION: 97 % | DIASTOLIC BLOOD PRESSURE: 84 MMHG | BODY MASS INDEX: 27.91 KG/M2

## 2024-04-19 DIAGNOSIS — F41.9 ANXIETY: Primary | ICD-10-CM

## 2024-04-19 DIAGNOSIS — K90.0 CELIAC DISEASE (HHS-HCC): ICD-10-CM

## 2024-04-19 DIAGNOSIS — K58.2 IRRITABLE BOWEL SYNDROME WITH BOTH CONSTIPATION AND DIARRHEA: ICD-10-CM

## 2024-04-19 DIAGNOSIS — E03.8 SUBCLINICAL HYPOTHYROIDISM: ICD-10-CM

## 2024-04-19 DIAGNOSIS — M54.2 NECK PAIN: ICD-10-CM

## 2024-04-19 PROCEDURE — 99214 OFFICE O/P EST MOD 30 MIN: CPT | Performed by: INTERNAL MEDICINE

## 2024-04-19 NOTE — PROGRESS NOTES
Detail Level: Detailed Subjective   Patient ID: Jocelyn Dee is a 44 y.o. female who presents for 1 month follow up.    Patient seen to follow-up on her anxiety.  Was started on sertraline some improvement she continues to see counseling she is still worried and has multiple concerns about her chronic neck pain , some tingling IBS, she has symptoms constipation or diarrhea and she will be having colonoscopy soon,  She continues to see if there is a clinic and gynecologist.  She was also started on thyroid medication a year ago and a lot of question for me which I went over her lab result and discuss it with her, advised her to continue taking it.  There is suspicion for celiac from previous workup.         Review of Systems   Respiratory: Negative.     Cardiovascular: Negative.    Gastrointestinal:         As HPI   Musculoskeletal:         As HPI       Objective   /84 (BP Location: Left arm, Patient Position: Sitting, BP Cuff Size: Adult)   Pulse 77   Temp 36.4 °C (97.6 °F) (Temporal)   Wt 73.8 kg (162 lb 9.6 oz)   SpO2 97%   BMI 27.91 kg/m²     Physical Exam  Constitutional:       Appearance: Normal appearance.   HENT:      Head: Normocephalic and atraumatic.   Eyes:      Extraocular Movements: Extraocular movements intact.      Pupils: Pupils are equal, round, and reactive to light.   Cardiovascular:      Rate and Rhythm: Normal rate and regular rhythm.      Heart sounds: Normal heart sounds.   Pulmonary:      Effort: Pulmonary effort is normal.      Breath sounds: Normal breath sounds. No wheezing or rhonchi.   Abdominal:      General: Abdomen is flat. Bowel sounds are normal. There is no distension.      Palpations: Abdomen is soft.   Musculoskeletal:      Cervical back: Normal range of motion and neck supple.      Right lower leg: No edema.      Left lower leg: No edema.      Comments: Paraspinal neck pain with palpation and movement of neck   Lymphadenopathy:      Cervical: No cervical adenopathy.   Skin:     General:  Skin is warm.   Neurological:      General: No focal deficit present.      Mental Status: She is alert and oriented to person, place, and time.   Psychiatric:         Mood and Affect: Mood normal.         Behavior: Behavior normal.         Assessment/Plan   Problem List Items Addressed This Visit             ICD-10-CM    Anxiety - Primary F41.9     Improving with Sertraline and Hydroxyzine as needed(helping her insomnia),continue counseling.         Neck pain M54.2     Recommend stretching exercises and massage.         Subclinical hypothyroidism E03.8     Continue levothyroxine, recent TSH was therapeutic level.         Irritable bowel syndrome with both constipation and diarrhea K58.2     Add Benefiber.  Can take MiraLAX as needed for constipation.  Refer to nutritionist for possible need for gluten-free diet.         Relevant Orders    Referral to Gastroenterology    Referral to Nutrition Services    Celiac disease ([facility]) K90.0    Relevant Orders    Referral to Nutrition Services

## 2024-04-20 PROBLEM — K90.0 CELIAC DISEASE (HHS-HCC): Status: ACTIVE | Noted: 2024-04-20

## 2024-04-20 PROBLEM — E66.9 OBESITY: Status: RESOLVED | Noted: 2024-03-13 | Resolved: 2024-04-20

## 2024-04-20 PROBLEM — K58.2 IRRITABLE BOWEL SYNDROME WITH BOTH CONSTIPATION AND DIARRHEA: Status: ACTIVE | Noted: 2024-04-20

## 2024-04-20 ASSESSMENT — ENCOUNTER SYMPTOMS
RESPIRATORY NEGATIVE: 1
CARDIOVASCULAR NEGATIVE: 1
ROS GI COMMENTS: AS HPI

## 2024-04-21 NOTE — ASSESSMENT & PLAN NOTE
Add Benefiber.  Can take MiraLAX as needed for constipation.  Refer to nutritionist for possible need for gluten-free diet.

## 2024-04-21 NOTE — ASSESSMENT & PLAN NOTE
>>ASSESSMENT AND PLAN FOR SUBCLINICAL HYPOTHYROIDISM WRITTEN ON 4/20/2024 10:25 PM BY MADHU RIVERA MD    Continue levothyroxine, recent TSH was therapeutic level.

## 2024-04-22 ENCOUNTER — LAB (OUTPATIENT)
Dept: LAB | Facility: LAB | Age: 44
End: 2024-04-22
Payer: COMMERCIAL

## 2024-04-22 DIAGNOSIS — B99.9 CHRONIC INFECTION: ICD-10-CM

## 2024-04-22 DIAGNOSIS — Z00.00 HEALTHCARE MAINTENANCE: ICD-10-CM

## 2024-04-22 DIAGNOSIS — Z13.1 SCREENING FOR DIABETES MELLITUS: ICD-10-CM

## 2024-04-22 DIAGNOSIS — J32.4 CHRONIC PANSINUSITIS: ICD-10-CM

## 2024-04-22 DIAGNOSIS — N96 RECURRENT PREGNANCY LOSS WITHOUT CURRENT PREGNANCY: ICD-10-CM

## 2024-04-22 DIAGNOSIS — N93.9 ABNORMAL UTERINE BLEEDING: ICD-10-CM

## 2024-04-22 LAB
ALBUMIN SERPL BCP-MCNC: 4.5 G/DL (ref 3.4–5)
ALP SERPL-CCNC: 33 U/L (ref 33–110)
ALT SERPL W P-5'-P-CCNC: 20 U/L (ref 7–45)
ANION GAP SERPL CALC-SCNC: 12 MMOL/L (ref 10–20)
AST SERPL W P-5'-P-CCNC: 15 U/L (ref 9–39)
B2 GLYCOPROT1 IGA SER-ACNC: 0.6 U/ML
B2 GLYCOPROT1 IGG SER-ACNC: <1.4 U/ML
B2 GLYCOPROT1 IGM SER-ACNC: 1.8 U/ML
BASOPHILS # BLD AUTO: 0.04 X10*3/UL (ref 0–0.1)
BASOPHILS NFR BLD AUTO: 0.6 %
BILIRUB SERPL-MCNC: 0.5 MG/DL (ref 0–1.2)
BUN SERPL-MCNC: 8 MG/DL (ref 6–23)
CALCIUM SERPL-MCNC: 9.6 MG/DL (ref 8.6–10.6)
CARDIOLIPIN IGA SERPL-ACNC: 0.8 APL U/ML
CARDIOLIPIN IGG SER IA-ACNC: <1.6 GPL U/ML
CARDIOLIPIN IGM SER IA-ACNC: 1.2 MPL U/ML
CHLORIDE SERPL-SCNC: 105 MMOL/L (ref 98–107)
CHOLEST SERPL-MCNC: 212 MG/DL (ref 0–199)
CHOLESTEROL/HDL RATIO: 4.6
CO2 SERPL-SCNC: 26 MMOL/L (ref 21–32)
CREAT SERPL-MCNC: 0.76 MG/DL (ref 0.5–1.05)
EGFRCR SERPLBLD CKD-EPI 2021: >90 ML/MIN/1.73M*2
EOSINOPHIL # BLD AUTO: 0.14 X10*3/UL (ref 0–0.7)
EOSINOPHIL NFR BLD AUTO: 2 %
ERYTHROCYTE [DISTWIDTH] IN BLOOD BY AUTOMATED COUNT: 12.2 % (ref 11.5–14.5)
ERYTHROCYTE [DISTWIDTH] IN BLOOD BY AUTOMATED COUNT: 12.2 % (ref 11.5–14.5)
EST. AVERAGE GLUCOSE BLD GHB EST-MCNC: 103 MG/DL
GLUCOSE SERPL-MCNC: 97 MG/DL (ref 74–99)
HBA1C MFR BLD: 5.2 %
HCT VFR BLD AUTO: 41.9 % (ref 36–46)
HCT VFR BLD AUTO: 41.9 % (ref 36–46)
HDLC SERPL-MCNC: 45.7 MG/DL
HGB BLD-MCNC: 13.9 G/DL (ref 12–16)
HGB BLD-MCNC: 13.9 G/DL (ref 12–16)
IGA SERPL-MCNC: 138 MG/DL (ref 70–400)
IGG SERPL-MCNC: 933 MG/DL (ref 700–1600)
IGM SERPL-MCNC: 134 MG/DL (ref 40–230)
IMM GRANULOCYTES # BLD AUTO: 0.01 X10*3/UL (ref 0–0.7)
IMM GRANULOCYTES NFR BLD AUTO: 0.1 % (ref 0–0.9)
LDLC SERPL CALC-MCNC: 150 MG/DL
LYMPHOCYTES # BLD AUTO: 2.45 X10*3/UL (ref 1.2–4.8)
LYMPHOCYTES NFR BLD AUTO: 34.4 %
MCH RBC QN AUTO: 30.7 PG (ref 26–34)
MCH RBC QN AUTO: 30.7 PG (ref 26–34)
MCHC RBC AUTO-ENTMCNC: 33.2 G/DL (ref 32–36)
MCHC RBC AUTO-ENTMCNC: 33.2 G/DL (ref 32–36)
MCV RBC AUTO: 93 FL (ref 80–100)
MCV RBC AUTO: 93 FL (ref 80–100)
MONOCYTES # BLD AUTO: 0.4 X10*3/UL (ref 0.1–1)
MONOCYTES NFR BLD AUTO: 5.6 %
NEUTROPHILS # BLD AUTO: 4.08 X10*3/UL (ref 1.2–7.7)
NEUTROPHILS NFR BLD AUTO: 57.3 %
NON HDL CHOLESTEROL: 166 MG/DL (ref 0–149)
NRBC BLD-RTO: 0 /100 WBCS (ref 0–0)
NRBC BLD-RTO: 0 /100 WBCS (ref 0–0)
PLATELET # BLD AUTO: 348 X10*3/UL (ref 150–450)
PLATELET # BLD AUTO: 348 X10*3/UL (ref 150–450)
POTASSIUM SERPL-SCNC: 4.4 MMOL/L (ref 3.5–5.3)
PROT SERPL-MCNC: 7.3 G/DL (ref 6.4–8.2)
RBC # BLD AUTO: 4.53 X10*6/UL (ref 4–5.2)
RBC # BLD AUTO: 4.53 X10*6/UL (ref 4–5.2)
SODIUM SERPL-SCNC: 139 MMOL/L (ref 136–145)
THYROPEROXIDASE AB SERPL-ACNC: 43 IU/ML
TRIGL SERPL-MCNC: 84 MG/DL (ref 0–149)
VLDL: 17 MG/DL (ref 0–40)
WBC # BLD AUTO: 7.1 X10*3/UL (ref 4.4–11.3)
WBC # BLD AUTO: 7.1 X10*3/UL (ref 4.4–11.3)

## 2024-04-22 PROCEDURE — 36415 COLL VENOUS BLD VENIPUNCTURE: CPT

## 2024-04-22 PROCEDURE — 83520 IMMUNOASSAY QUANT NOS NONAB: CPT

## 2024-04-22 PROCEDURE — 86317 IMMUNOASSAY INFECTIOUS AGENT: CPT

## 2024-04-22 PROCEDURE — 88187 FLOWCYTOMETRY/READ 2-8: CPT | Performed by: ALLERGY & IMMUNOLOGY

## 2024-04-22 PROCEDURE — 80061 LIPID PANEL: CPT

## 2024-04-22 PROCEDURE — 82784 ASSAY IGA/IGD/IGG/IGM EACH: CPT

## 2024-04-22 PROCEDURE — 85025 COMPLETE CBC W/AUTO DIFF WBC: CPT

## 2024-04-22 PROCEDURE — 86146 BETA-2 GLYCOPROTEIN ANTIBODY: CPT

## 2024-04-22 PROCEDURE — 80053 COMPREHEN METABOLIC PANEL: CPT

## 2024-04-22 PROCEDURE — 85613 RUSSELL VIPER VENOM DILUTED: CPT

## 2024-04-22 PROCEDURE — 86147 CARDIOLIPIN ANTIBODY EA IG: CPT

## 2024-04-22 PROCEDURE — 88184 FLOWCYTOMETRY/ TC 1 MARKER: CPT

## 2024-04-22 PROCEDURE — 86376 MICROSOMAL ANTIBODY EACH: CPT

## 2024-04-22 PROCEDURE — 88185 FLOWCYTOMETRY/TC ADD-ON: CPT

## 2024-04-22 PROCEDURE — 83036 HEMOGLOBIN GLYCOSYLATED A1C: CPT

## 2024-04-22 PROCEDURE — 85730 THROMBOPLASTIN TIME PARTIAL: CPT

## 2024-04-22 PROCEDURE — 86162 COMPLEMENT TOTAL (CH50): CPT

## 2024-04-24 ENCOUNTER — NUTRITION (OUTPATIENT)
Dept: PRIMARY CARE | Facility: CLINIC | Age: 44
End: 2024-04-24
Payer: COMMERCIAL

## 2024-04-24 DIAGNOSIS — K90.0 CELIAC DISEASE (HHS-HCC): ICD-10-CM

## 2024-04-24 DIAGNOSIS — K58.2 IRRITABLE BOWEL SYNDROME WITH BOTH CONSTIPATION AND DIARRHEA: Primary | ICD-10-CM

## 2024-04-24 LAB
C TETANI TOXOID IGG SERPL IA-ACNC: 1.3 IU/ML
CD19 CELLS # BLD: 0.37 X10E9/L
CD19 CELLS NFR BLD: 15 %
CD3 CELLS # BLD: 1.67 X10E9/L
CD3 CELLS NFR SPEC: 68 %
CD3+CD4+ CELLS # BLD: 1.2 X10E9/L
CD3+CD4+ CELLS # BLD: 1201 /MM3
CD3+CD4+ CELLS NFR BLD: 49 %
CD3+CD4+ CELLS/CD3+CD8+ CLL BLD: 2.88 %
CD3+CD4-CD8-CD45+ CELLS NFR BLD: 2 %
CD3+CD8+ CELLS # BLD: 0.42 X10E9/L
CD3+CD8+ CELLS NFR BLD: 17 %
CD3-CD16+CD56+ CELLS # BLD: 0.42 X10E9/L
CD3-CD16+CD56+ CELLS NFR BLD: 17 %
CH50 SERPL-ACNC: 42.8 U/ML (ref 38.7–89.9)
FLOW CYTOMETRY SPECIALIST REVIEW: NORMAL
LYMPHOCYTES # SPEC AUTO: 2.45 X10*3/UL
PATH REVIEW, IMMUNODEFICIENCY PROFILE: NORMAL

## 2024-04-24 PROCEDURE — 97802 MEDICAL NUTRITION INDIV IN: CPT | Performed by: DIETITIAN, REGISTERED

## 2024-04-24 NOTE — PROGRESS NOTES
Reason for Nutrition Visit:  Pt is a 44 y.o. female being seen at [facility] in [place] referred for   1. Irritable bowel syndrome with both constipation and diarrhea        2. Celiac disease ([facility])           Medication Documentation Review Audit       Reviewed by Chanda Addison MD (Physician) on 04/19/24 at 0855      Medication Order Taking? Sig Documenting Provider Last Dose Status   cholecalciferol (Vitamin D-3) 25 MCG (1000 UT) capsule [id number] Yes Take 1 capsule (25 mcg) by mouth once daily. Historical Provider, MD Taking Active   estradiol (Estrace) 0.01 % (0.1 mg/gram) vaginal cream [id number] Yes Apply daily 1 gram apply for vagina for 3 weeks, then 3 times per week. SIL Brand Taking Active   fluticasone (Flonase) 50 mcg/actuation nasal spray [id number] Yes Administer 1 spray into each nostril once daily. Shake gently. Before first use, prime pump. After use, clean tip and replace cap. Chanda Addison MD Taking Active   hydrOXYzine HCL (Atarax) 25 mg tablet [id number] Yes Take 1 tablet (25 mg) by mouth 2 times a day.   Patient taking differently: Take 1 tablet (25 mg) by mouth 2 times a day as needed.    Chanda Addison MD Taking Active   levothyroxine (Synthroid, Levoxyl) 50 mcg tablet [id number] Yes Take 1 tablet (50 mcg) by mouth once daily. Jaymie Chong MD Taking Active   lidocaine-prilocaine (Emla) 2.5-2.5 % cream [id number] Yes APPLY TO TREATMENT AREA 1 HOUR PRIOR TO INJECTION. SIL Ulloa Taking Active   LORazepam (Ativan) 0.5 mg tablet [id number] Yes Take 1 tablet (0.5 mg) by mouth if needed for anxiety (flight anxiety; take prior to flight) for up to 6 doses. Chanda Addison MD Taking Active   methen-m.blue-s.phos-phsal-hyo (UribeL) 118-10-40.8-36 mg capsule [id number] Yes Take 1 capsule by mouth 3 times a day as needed (PRN bladder spasm). Lupe L Barragan, APRN-CNP Taking Active   peg-sod sul-NaCl-KCl-asb-C (MoviPrep) 100-7.5-2.691 gram solution  [id number] Yes Schedule the initial dose the evening before colonoscopy, followed by a second dose the morning of procedure (2 day regimen) or ~ 90 minutes after starting dose (1 day regimen). Elodia Zhu MD Taking Active   prenat.vits,shellie,min-iron-folic tablet [id number] Yes Take 1 tablet by mouth once daily. Historical Provider, MD Taking Active   progesterone (Prometrium) 200 mg capsule [id number] Yes Take 1 capsule (200 mg) by mouth. TAKE 1 CAPSULE BY MOUTH EVERY DAY FOR THE FIRST 12 DAYS OF EACH MONTH Historical Provider, MD Taking Active   progesterone 50 mg/mL injection [id number] Yes DRAW UP 2 ML (100 MG) AND INJECT INTO THE MUSCLE AT THE SAME TIME EACH MORNING AS DIRECTED PER PROVIDER. Jaymie Chong MD Taking Active   sertraline (Zoloft) 50 mg tablet [id number] Yes Take 1 tablet (50 mg) by mouth once daily. Chanda Addison MD Taking Active   Soolantra 1 % cream [id number] Yes 1 Application once daily. APPLY TOPICALLY TO FACE 1 TIME IN THE MORNING Historical Provider, MD Taking Active                   Past Medical Hx:  Patient Active Problem List   Diagnosis    Abnormal mammogram    LEELEE positive    Anxiety    Cervical radiculopathy, acute    Early menopause    Hand tingling    History of loop electrical excision procedure (LEEP)    Hyperlipidemia    Low back pain    Lumbosacral radiculitis    Neck pain    Other specified functional intestinal disorders    Paresthesia    Premature ovarian failure    Psychological and behavioral factors associated with disorders or diseases classified elsewhere    Seborrheic dermatitis, unspecified    Skin sensation disturbance    Subclinical hypothyroidism    Tingling of both feet    Urinary urgency    Tingling in extremities    Acute pain in female pelvis    Cervical spondylosis without myelopathy    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    Dysmenorrhea    Female infertility    Lumbosacral spondylosis without myelopathy    Acute respiratory infection  "   Acute cough    Arthralgia    History of elevated lipids    History of infection due to human papilloma virus (HPV)    Human papilloma virus (HPV) infection    Tingling of skin    Undifferentiated connective tissue disease (Multi)    Lipoma of left lower extremity    Chronic pansinusitis    Irritable bowel syndrome with both constipation and diarrhea    Celiac disease ([facility])      Weight change:    162# (4/19/24)  150# (lowest weight, 9/2023)  153-156# (1/2023-6/2023)  161# (12/2022)  166# (11/2022)  168-171# (1/2022-4/2022)  Significant Weight Change: No    Lab Results   Component Value Date    HGBA1C 5.2 04/22/2024    CHOL 212 (H) 04/22/2024    LDLCALC 150 (H) 04/22/2024    TRIG 84 04/22/2024    HDL 45.7 04/22/2024    LDLF 109 (H) 01/11/2023      Food and Nutrition Hx:  The consult was sent for IBS/celiac.   Patient reports she had an EGD which was negative for celiac disease, and that she had a genetic test that revealed she is a carrier for a celiac gene. She doesn't report that there is a clear association between her GI symptoms and gluten consumption.   She has done an elimination diet in the past, in which she eliminated dairy, sugar, gluten, alcohol. The elimination diet was not a comprehensive low-FODMAP diet. She reports the re-introduction phase of the diet was not structured / effective at helping her know how much she can tolerate of the eliminated foods.      She noted her cholesterol was elevated she'd like to eat healthy to promote lower cholesterol level.   She has done a diet in the past that generated about 10# weight loss, \"Maliha's\" The plan included fasting, zero sugar, zero fat. She brought along the diet book to show me.     Has had tingling sensation in her body.   Feels that she has joint pain after eating processed meats. Also feels pain if she eats an ingredient with MSG.     Enjoys spicy foods.     She lives with her . She has been trying to conceive. She said her health " "issues became greater after having a miscarriage. She said she started going through early menopause at age 37. She has a therapist.   She is interested in a holistic approach toward health management, she visits a chiropractor who has recommended herbal supplements (see supplement list).     24 Diet Recall:  Typically eats about 2 times per day, not into snacking.   Wakes up 5:30-7:30  Meal 1: morning/breakfast food habits have changed   - had been typically fasting in the morning (not eating until 10), but recently realized she would prefer to eat.   - juicing  - likes eggs  - had been making homemade granola, cottage cheese & fruit with it  Meal 2:  - nalini spinach salad and quiche with broccoli  Meal 3:   - last night ate Greek gennaro wrap for dinner (chicken, lettuce, gennaro bread - not gluten-free).   Last food of the day is typically not after 7 but sometimes has a snack at night.   Beverages: juicing lately, minimal EtOH (used to drink more EtOH but has intentionally reduced consumption to occasionally 1 glass wine).     Allergies: None  Intolerance: unclear what her precise intolerances are, but she reports feeling better when she is avoiding lactose, sugar, alcohol, and perhaps gluten.   GI Symptoms : loose stools more often than constipation. Reports her stools have never been regular, dating back to childhood. Denies reflux.    Swallowing Difficulty: No problems with swallowing  Chewing: no problems chewing    Food Preparation: Patient, she enjoys cooking. Her  doesn't enjoy food as much as she does.   Grocery Shopping: Patient  No difficulty affording food,   Dining out: did not discuss    Relationship with Food:   Appetite: Good but she states she doesn't feel hungry until she feels too hungry.   Binging: Frequently per her descirption, \"I always eat until I'm too full\" but what she described might not be technically binge eating. She described eating quickly, and feeling too hungry at the onset of " "a meal. She said she thinks she needs to eat more slowly.   She reports feeling tired after she eats.     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 Enjoys swimming but hasn't been doing it lately - PT exercises for her back, leisure swimming, sauna.     Sleep: did not discuss, although she states she feels tired after eating.     Dietary Supplements: Multizyme (includes pancreatin, amylase, cellulase, lipase and other ingredients), Tumeric/fenugreek, Methyl B12, Cyruta (contains 3 mg vitamin C and herbal supplements), EZMg - she said she could not continue taking this because she had an \"anxiety attack\" when she took it. She also takes an iron-free prenatal vitamin.       Nutrition Focused Physical Exam:  Performed/Deferred: Deferred as pt visually appears well-nourished with no signs of malnutrition    Nutrition Diagnosis:  Diagnosis Statement 1:  Diagnosis Status: New  Diagnosis : Altered GI function  related to  unclear etiology  as evidenced by  patient reports irregular stool consistency/frequency.     Nutrition Interventions:  Via teach back method patient verbalized understanding of the following topics:  Nutrition education on the following diet topic(s): Gluten Free , Low FODMAP, and Timing of Meals  Nutrition counseling using the following strategy: Nutrition Counseling: Motivational Interviewing  Coordination of Care: None    Education:  Discussed zooming out to see the big picture of her diet first instead of focusing on little details. Talked about her typical meal pattern has not been to eat at regular intervals during the day, and that she often is too hungry when starting a meal. Encouraged her to eat the first meal of the day within about 2-3 hours of waking up. Eat food at least every 5 hours. If it will be longer than 5 hours between meals, consider adding a snack between the meals. Eating at regular " intervals can help prevent becoming overly hungry. Discussed that better pacing of meals through the day could have a positive impact on her digestion. Talked about how mood/emotions can impact digestive function, it is great she is already working with a therapist, we talked about how a therapist can be helpful with discussing relationship with food. Getting more restrictive might not be the answer to her digestive issues.     Instructed patient on adhering to a gluten-free diet:  - Explained the mechanism of celiac disease and which foods a person with celiac disease cannot tolerate: wheat, barley, rye  - Encouraged patient to optimize intake of foods that are naturally gluten-free, such as dairy, eggs, fish, seafood, fruits, legumes (beans, peas, and lentils), meat, poultry, nuts, seeds, and vegetables.  - Provided a list of food ideas that can be used as substitutes for gluten-containing items, including foods like pizza crust, baking mixes, gluten-free flours, snacks, etc.  - Provided a list of ingredients that can be derived from wheat, barley or rye. Encouraged label reading of the ingredients panel to avoid these ingredients.  - discussed that since she states she hasn't had a positive test confirming that she has celiac disease and she isn't sure if she feels better when following a gluten free diet, she could consider focusing on FODMAPS instead of gluten if she desires.     3. Advised patient that the low-FODMAP diet can help with IBS-related GI symptoms.   - Foods that are rich in FODMAPs are carbohydrate-based foods that can be difficult for some people to digest. Some of these foods can ferment in the GI tract. The poor digestion and/or fermentation of these foods can lead to symptoms like gas, abdominal pain, diarrhea, constipation or other digestive symptoms.   - Explained that a FODMAP elimination diet can be helpful to learn which of the 6 categories are difficult to digest.   - The low-FODMAP diet  is not intended to be followed for an extended period of time.   - Instructed patient on the elimination phase of the diet. Explained the 6 categories of high-FODMAP foods. Advised that the low-FODMAP food list will be used to create meals for 2 weeks. The list of high-FODMAP ingredients will help to illustrate ingredients that will be avoided during the elimination period. Provided sample meals ideas. Explained the [facility] FODMAP shikha that can be helpful to determine if ingredients are high/low in FODMAP. GI symptoms should be monitored daily for the duration of the elimination phase. If symptoms improve on a low-FODMAP diet, then the re-introduction phase can follow.   - Instructed patient on the re-introduction phase. Advised that it can be helpful to have a follow up with the dietitian during this phase to discuss the experience and receive support with meal planning. The re-introduction phase will involve spending 6 weeks re-introducing the high-FODMAP categories of foods one at a time.      Monitoring & Evaluation:  Monitoring & Evaluation: Amount of Food - Estimated, Types of Food, Meal/Snack Pattern - Estimated, and Diet Experience: Food Intolerance    Nutrition Goals:  She will prioritize eating at regular intervals so that she is not feeling overly hungry for meals.   She will follow a gluten-free diet if she desires, especially if she feels better avoiding gluten.   She will do a trial of low-FODMAP diet for 2 weeks (she can choose when to do the trial, it doesn't need to be now) and will follow the re-introduction schedule if the evaluation phase alleviates her symptoms.   She will continue to be engaged in stress management, and if she feels that it would be helpful to connect with a therapist who specializes in relationship with food she could pursue that.       Handouts:  [facility] Gluten-free , FODMAP elimination / re-introduction packets, hand-written notes from our discussion    Follow up:    5/29 at 10:30    Readiness to Change : Good  Level of Understanding : Good  Anticipated Compliant : Good

## 2024-04-24 NOTE — PATIENT INSTRUCTIONS
Discussed zooming out to see the big picture of her diet first instead of focusing on little details. Talked about her typical meal pattern has not been to eat at regular intervals during the day, and that she often is too hungry when starting a meal. Encouraged her to eat the first meal of the day within about 2-3 hours of waking up. Eat food at least every 5 hours. If it will be longer than 5 hours between meals, consider adding a snack between the meals. Eating at regular intervals can help prevent becoming overly hungry. Discussed that better pacing of meals through the day could have a positive impact on her digestion. Talked about how mood/emotions can impact digestive function, it is great she is already working with a therapist, we talked about how a therapist can be helpful with discussing relationship with food. Getting more restrictive might not be the answer to her digestive issues.     Instructed patient on adhering to a gluten-free diet:  - Explained the mechanism of celiac disease and which foods a person with celiac disease cannot tolerate: wheat, barley, rye  - Encouraged patient to optimize intake of foods that are naturally gluten-free, such as dairy, eggs, fish, seafood, fruits, legumes (beans, peas, and lentils), meat, poultry, nuts, seeds, and vegetables.  - Provided a list of food ideas that can be used as substitutes for gluten-containing items, including foods like pizza crust, baking mixes, gluten-free flours, snacks, etc.  - Provided a list of ingredients that can be derived from wheat, barley or rye. Encouraged label reading of the ingredients panel to avoid these ingredients.  - discussed that since she states she hasn't had a positive test confirming that she has celiac disease and she isn't sure if she feels better when following a gluten free diet, she could consider focusing on FODMAPS instead of gluten if she desires.     3. Advised patient that the low-FODMAP diet can help with  IBS-related GI symptoms.   - Foods that are rich in FODMAPs are carbohydrate-based foods that can be difficult for some people to digest. Some of these foods can ferment in the GI tract. The poor digestion and/or fermentation of these foods can lead to symptoms like gas, abdominal pain, diarrhea, constipation or other digestive symptoms.   - Explained that a FODMAP elimination diet can be helpful to learn which of the 6 categories are difficult to digest.   - The low-FODMAP diet is not intended to be followed for an extended period of time.   - Instructed patient on the elimination phase of the diet. Explained the 6 categories of high-FODMAP foods. Advised that the low-FODMAP food list will be used to create meals for 2 weeks. The list of high-FODMAP ingredients will help to illustrate ingredients that will be avoided during the elimination period. Provided sample meals ideas. Explained the [facility] FODMAP shikha that can be helpful to determine if ingredients are high/low in FODMAP. GI symptoms should be monitored daily for the duration of the elimination phase. If symptoms improve on a low-FODMAP diet, then the re-introduction phase can follow.   - Instructed patient on the re-introduction phase. Advised that it can be helpful to have a follow up with the dietitian during this phase to discuss the experience and receive support with meal planning. The re-introduction phase will involve spending 6 weeks re-introducing the high-FODMAP categories of foods one at a time.

## 2024-04-25 LAB
DRVVT SCREEN TO CONFIRM RATIO: 1.06 RATIO
DRVVT/DRVVT CFM NRMLZD PPP-RTO: 0.89 RATIO
DRVVT/DRVVT CFM P DOAC NEUT NORM PPP-RTO: 1.19 RATIO
LA 2 SCREEN W REFLEX-IMP: NORMAL
NORMALIZED SCT PPP-RTO: 1.12 RATIO
S PN DA SERO 19F IGG SER-MCNC: 2.78 UG/ML
S PNEUM DA 1 IGG SER-MCNC: 0.42 UG/ML
S PNEUM DA 10A IGG SER-MCNC: 1.49 UG/ML
S PNEUM DA 11A IGG SER-MCNC: 0.93 UG/ML
S PNEUM DA 12F IGG SER-MCNC: 0.13 UG/ML
S PNEUM DA 14 IGG SER-MCNC: 0.21 UG/ML
S PNEUM DA 15B IGG SER-MCNC: 0.33 UG/ML
S PNEUM DA 17F IGG SER-MCNC: 0.46 UG/ML
S PNEUM DA 18C IGG SER-MCNC: 0.69 UG/ML
S PNEUM DA 19A IGG SER-MCNC: 1.19 UG/ML
S PNEUM DA 2 IGG SER-MCNC: 0.44 UG/ML
S PNEUM DA 20A IGG SER-MCNC: 1.97 UG/ML
S PNEUM DA 22F IGG SER-MCNC: 0.07 UG/ML
S PNEUM DA 23F IGG SER-MCNC: 0.21 UG/ML
S PNEUM DA 3 IGG SER-MCNC: 0.64 UG/ML
S PNEUM DA 33F IGG SER-MCNC: 5.91 UG/ML
S PNEUM DA 4 IGG SER-MCNC: 0.98 UG/ML
S PNEUM DA 5 IGG SER-MCNC: 0.63 UG/ML
S PNEUM DA 6B IGG SER-MCNC: 0.28 UG/ML
S PNEUM DA 7F IGG SER-MCNC: 0.32 UG/ML
S PNEUM DA 8 IGG SER-MCNC: 0.22 UG/ML
S PNEUM DA 9N IGG SER-MCNC: 0.29 UG/ML
S PNEUM DA 9V IGG SER-MCNC: 0.8 UG/ML
S PNEUM SEROTYPE IGG SER-IMP: NORMAL
SILICA CLOTTING TIME CONFIRMATION: 0.84 RATIO
SILICA CLOTTING TIME SCREEN: 0.94 RATIO

## 2024-04-27 NOTE — RESULT ENCOUNTER NOTE
Reviewed labs, all wnl with exception of lipid panel. Sent [employer] message to patient advising to repeat if obtained when not fasting or to follow up with PCP if this was a true fasting level.   Aye Teresa 04/27/24 11:05 AM

## 2024-04-30 ENCOUNTER — TELEPHONE (OUTPATIENT)
Dept: ENDOCRINOLOGY | Facility: CLINIC | Age: 44
End: 2024-04-30

## 2024-04-30 ENCOUNTER — APPOINTMENT (OUTPATIENT)
Dept: PRIMARY CARE | Facility: CLINIC | Age: 44
End: 2024-04-30
Payer: COMMERCIAL

## 2024-04-30 LAB — MANNOSE-BP SER-MCNC: 51 NG/ML

## 2024-04-30 NOTE — TELEPHONE ENCOUNTER
Returned patient call regarding an update on her care for Dr. Chong. The following message was sent to Dr. Chong, IVF fellows and primary RN.     1. This RN reviewed with patient my chart sent by Dr. Teresa regarding her recent blood work. She state she does not recall if she had fasted for her lipid panel but is going to follow up with her PCP. She believes that it is diet related and is working with her nutritionist (who is not concerned about her cholesterol).     2. She is going to follow up with her PCP regarding her EKG which she has not yet completed.     3. Can she call at any point to schedule her HSG? She does not get menses and is on x2 months of Aygestin started beginning of April.     4. She states that she started her Aygestin beginning of April and takes before bed. She states recently that she has been waking up at night feeling burning/discomfort in her gallbladder/liver area (under right rib cage) that has become more prominent. She will continue taking aygestin but wanted to mention it.     5. She wanted to updated Dr. Chong that she has not been taking estrogen cream prescribed by urologist.     6. She states that she has an infection and is requiring a root canal next Tuesday.     7. She states she has been taking Zoloft prescribed by Dr. Byrnes and it has been helping.     8. She states she has a colonoscopy scheduled for July.     Patient instructed RN will follow up with response. Patient agreeable. Patient denies further questions or concerns.   ARTHUR AMSO on 4/30/24 at 10:21 AM.

## 2024-05-01 ENCOUNTER — TELEPHONE (OUTPATIENT)
Dept: ALLERGY | Facility: CLINIC | Age: 44
End: 2024-05-01
Payer: COMMERCIAL

## 2024-05-01 NOTE — TELEPHONE ENCOUNTER
Reviewed labs with patient. Low MBL. Would like to repeat.  Recommend Pneumovax. Patient to consider.  Having dental work done and will follow up after.

## 2024-05-04 ENCOUNTER — HOSPITAL ENCOUNTER (EMERGENCY)
Facility: HOSPITAL | Age: 44
Discharge: HOME | End: 2024-05-04
Attending: EMERGENCY MEDICINE
Payer: COMMERCIAL

## 2024-05-04 ENCOUNTER — APPOINTMENT (OUTPATIENT)
Dept: RADIOLOGY | Facility: HOSPITAL | Age: 44
End: 2024-05-04
Payer: COMMERCIAL

## 2024-05-04 VITALS
SYSTOLIC BLOOD PRESSURE: 141 MMHG | HEIGHT: 64 IN | RESPIRATION RATE: 18 BRPM | DIASTOLIC BLOOD PRESSURE: 84 MMHG | TEMPERATURE: 98.2 F | BODY MASS INDEX: 27.31 KG/M2 | WEIGHT: 160 LBS | HEART RATE: 84 BPM | OXYGEN SATURATION: 97 %

## 2024-05-04 DIAGNOSIS — K08.89 PAIN, DENTAL: ICD-10-CM

## 2024-05-04 DIAGNOSIS — N39.0 URINARY TRACT INFECTION WITHOUT HEMATURIA, SITE UNSPECIFIED: Primary | ICD-10-CM

## 2024-05-04 DIAGNOSIS — K29.70 GASTRITIS WITHOUT BLEEDING, UNSPECIFIED CHRONICITY, UNSPECIFIED GASTRITIS TYPE: ICD-10-CM

## 2024-05-04 LAB
ABO GROUP (TYPE) IN BLOOD: NORMAL
ALBUMIN SERPL BCP-MCNC: 4.7 G/DL (ref 3.4–5)
ALP SERPL-CCNC: 31 U/L (ref 33–110)
ALT SERPL W P-5'-P-CCNC: 17 U/L (ref 7–45)
ANION GAP SERPL CALC-SCNC: 15 MMOL/L (ref 10–20)
ANTIBODY SCREEN: NORMAL
APPEARANCE UR: ABNORMAL
AST SERPL W P-5'-P-CCNC: 15 U/L (ref 9–39)
BACTERIA #/AREA URNS AUTO: ABNORMAL /HPF
BASOPHILS # BLD AUTO: 0.03 X10*3/UL (ref 0–0.1)
BASOPHILS NFR BLD AUTO: 0.2 %
BILIRUB SERPL-MCNC: 0.4 MG/DL (ref 0–1.2)
BILIRUB UR STRIP.AUTO-MCNC: NEGATIVE MG/DL
BUN SERPL-MCNC: 9 MG/DL (ref 6–23)
CALCIUM SERPL-MCNC: 9.8 MG/DL (ref 8.6–10.3)
CHLORIDE SERPL-SCNC: 105 MMOL/L (ref 98–107)
CO2 SERPL-SCNC: 23 MMOL/L (ref 21–32)
COLOR UR: ABNORMAL
CREAT SERPL-MCNC: 0.69 MG/DL (ref 0.5–1.05)
EGFRCR SERPLBLD CKD-EPI 2021: >90 ML/MIN/1.73M*2
EOSINOPHIL # BLD AUTO: 0.09 X10*3/UL (ref 0–0.7)
EOSINOPHIL NFR BLD AUTO: 0.5 %
ERYTHROCYTE [DISTWIDTH] IN BLOOD BY AUTOMATED COUNT: 12.3 % (ref 11.5–14.5)
GLUCOSE SERPL-MCNC: 97 MG/DL (ref 74–99)
GLUCOSE UR STRIP.AUTO-MCNC: NORMAL MG/DL
HCG UR QL IA.RAPID: NEGATIVE
HCT VFR BLD AUTO: 46.1 % (ref 36–46)
HGB BLD-MCNC: 15.5 G/DL (ref 12–16)
IMM GRANULOCYTES # BLD AUTO: 0.07 X10*3/UL (ref 0–0.7)
IMM GRANULOCYTES NFR BLD AUTO: 0.4 % (ref 0–0.9)
INR PPP: 1.2 (ref 0.9–1.1)
KETONES UR STRIP.AUTO-MCNC: ABNORMAL MG/DL
LACTATE SERPL-SCNC: 0.7 MMOL/L (ref 0.4–2)
LEUKOCYTE ESTERASE UR QL STRIP.AUTO: ABNORMAL
LIPASE SERPL-CCNC: 13 U/L (ref 9–82)
LYMPHOCYTES # BLD AUTO: 2.31 X10*3/UL (ref 1.2–4.8)
LYMPHOCYTES NFR BLD AUTO: 12.3 %
MCH RBC QN AUTO: 30.6 PG (ref 26–34)
MCHC RBC AUTO-ENTMCNC: 33.6 G/DL (ref 32–36)
MCV RBC AUTO: 91 FL (ref 80–100)
MONOCYTES # BLD AUTO: 1.44 X10*3/UL (ref 0.1–1)
MONOCYTES NFR BLD AUTO: 7.6 %
MUCOUS THREADS #/AREA URNS AUTO: ABNORMAL /LPF
NEUTROPHILS # BLD AUTO: 14.91 X10*3/UL (ref 1.2–7.7)
NEUTROPHILS NFR BLD AUTO: 79 %
NITRITE UR QL STRIP.AUTO: NEGATIVE
NRBC BLD-RTO: 0 /100 WBCS (ref 0–0)
PH UR STRIP.AUTO: 6.5 [PH]
PLATELET # BLD AUTO: 351 X10*3/UL (ref 150–450)
POTASSIUM SERPL-SCNC: 3.5 MMOL/L (ref 3.5–5.3)
PROT SERPL-MCNC: 7.7 G/DL (ref 6.4–8.2)
PROT UR STRIP.AUTO-MCNC: NEGATIVE MG/DL
PROTHROMBIN TIME: 13.7 SECONDS (ref 9.8–12.8)
RBC # BLD AUTO: 5.06 X10*6/UL (ref 4–5.2)
RBC # UR STRIP.AUTO: NEGATIVE /UL
RBC #/AREA URNS AUTO: ABNORMAL /HPF
RH FACTOR (ANTIGEN D): NORMAL
SODIUM SERPL-SCNC: 139 MMOL/L (ref 136–145)
SP GR UR STRIP.AUTO: 1.02
SQUAMOUS #/AREA URNS AUTO: ABNORMAL /HPF
UROBILINOGEN UR STRIP.AUTO-MCNC: NORMAL MG/DL
WBC # BLD AUTO: 18.9 X10*3/UL (ref 4.4–11.3)
WBC #/AREA URNS AUTO: ABNORMAL /HPF

## 2024-05-04 PROCEDURE — 96365 THER/PROPH/DIAG IV INF INIT: CPT | Mod: 59

## 2024-05-04 PROCEDURE — 36415 COLL VENOUS BLD VENIPUNCTURE: CPT | Performed by: STUDENT IN AN ORGANIZED HEALTH CARE EDUCATION/TRAINING PROGRAM

## 2024-05-04 PROCEDURE — 74177 CT ABD & PELVIS W/CONTRAST: CPT

## 2024-05-04 PROCEDURE — 2550000001 HC RX 255 CONTRASTS: Performed by: EMERGENCY MEDICINE

## 2024-05-04 PROCEDURE — 96375 TX/PRO/DX INJ NEW DRUG ADDON: CPT

## 2024-05-04 PROCEDURE — 99285 EMERGENCY DEPT VISIT HI MDM: CPT | Mod: 25

## 2024-05-04 PROCEDURE — 83605 ASSAY OF LACTIC ACID: CPT | Performed by: STUDENT IN AN ORGANIZED HEALTH CARE EDUCATION/TRAINING PROGRAM

## 2024-05-04 PROCEDURE — 83605 ASSAY OF LACTIC ACID: CPT | Performed by: EMERGENCY MEDICINE

## 2024-05-04 PROCEDURE — 86900 BLOOD TYPING SEROLOGIC ABO: CPT

## 2024-05-04 PROCEDURE — 36415 COLL VENOUS BLD VENIPUNCTURE: CPT

## 2024-05-04 PROCEDURE — 85610 PROTHROMBIN TIME: CPT

## 2024-05-04 PROCEDURE — 85025 COMPLETE CBC W/AUTO DIFF WBC: CPT | Performed by: STUDENT IN AN ORGANIZED HEALTH CARE EDUCATION/TRAINING PROGRAM

## 2024-05-04 PROCEDURE — 81001 URINALYSIS AUTO W/SCOPE: CPT | Performed by: EMERGENCY MEDICINE

## 2024-05-04 PROCEDURE — 2500000004 HC RX 250 GENERAL PHARMACY W/ HCPCS (ALT 636 FOR OP/ED): Performed by: EMERGENCY MEDICINE

## 2024-05-04 PROCEDURE — 83690 ASSAY OF LIPASE: CPT | Performed by: STUDENT IN AN ORGANIZED HEALTH CARE EDUCATION/TRAINING PROGRAM

## 2024-05-04 PROCEDURE — 80053 COMPREHEN METABOLIC PANEL: CPT | Performed by: EMERGENCY MEDICINE

## 2024-05-04 PROCEDURE — 87086 URINE CULTURE/COLONY COUNT: CPT | Mod: STJLAB | Performed by: EMERGENCY MEDICINE

## 2024-05-04 PROCEDURE — 74177 CT ABD & PELVIS W/CONTRAST: CPT | Performed by: STUDENT IN AN ORGANIZED HEALTH CARE EDUCATION/TRAINING PROGRAM

## 2024-05-04 PROCEDURE — 84075 ASSAY ALKALINE PHOSPHATASE: CPT | Performed by: STUDENT IN AN ORGANIZED HEALTH CARE EDUCATION/TRAINING PROGRAM

## 2024-05-04 PROCEDURE — 76705 ECHO EXAM OF ABDOMEN: CPT

## 2024-05-04 PROCEDURE — 2500000004 HC RX 250 GENERAL PHARMACY W/ HCPCS (ALT 636 FOR OP/ED)

## 2024-05-04 PROCEDURE — 81025 URINE PREGNANCY TEST: CPT | Performed by: EMERGENCY MEDICINE

## 2024-05-04 PROCEDURE — 99285 EMERGENCY DEPT VISIT HI MDM: CPT | Performed by: EMERGENCY MEDICINE

## 2024-05-04 PROCEDURE — 76705 ECHO EXAM OF ABDOMEN: CPT | Performed by: RADIOLOGY

## 2024-05-04 PROCEDURE — 83690 ASSAY OF LIPASE: CPT | Performed by: EMERGENCY MEDICINE

## 2024-05-04 PROCEDURE — 85025 COMPLETE CBC W/AUTO DIFF WBC: CPT | Performed by: EMERGENCY MEDICINE

## 2024-05-04 RX ORDER — CEFTRIAXONE 1 G/50ML
1 INJECTION, SOLUTION INTRAVENOUS ONCE
Status: COMPLETED | OUTPATIENT
Start: 2024-05-04 | End: 2024-05-04

## 2024-05-04 RX ORDER — AMOXICILLIN AND CLAVULANATE POTASSIUM 875; 125 MG/1; MG/1
1 TABLET, FILM COATED ORAL 2 TIMES DAILY
Qty: 14 TABLET | Refills: 0 | Status: SHIPPED | OUTPATIENT
Start: 2024-05-04 | End: 2024-05-11

## 2024-05-04 RX ORDER — MORPHINE SULFATE 4 MG/ML
4 INJECTION, SOLUTION INTRAMUSCULAR; INTRAVENOUS ONCE
Status: DISCONTINUED | OUTPATIENT
Start: 2024-05-04 | End: 2024-05-04

## 2024-05-04 RX ORDER — ONDANSETRON HYDROCHLORIDE 2 MG/ML
4 INJECTION, SOLUTION INTRAVENOUS ONCE
Status: COMPLETED | OUTPATIENT
Start: 2024-05-04 | End: 2024-05-04

## 2024-05-04 RX ORDER — KETOROLAC TROMETHAMINE 30 MG/ML
30 INJECTION, SOLUTION INTRAMUSCULAR; INTRAVENOUS ONCE
Status: DISCONTINUED | OUTPATIENT
Start: 2024-05-04 | End: 2024-05-04

## 2024-05-04 RX ORDER — PANTOPRAZOLE SODIUM 20 MG/1
40 TABLET, DELAYED RELEASE ORAL 2 TIMES DAILY
Qty: 56 TABLET | Refills: 0 | Status: SHIPPED | OUTPATIENT
Start: 2024-05-04 | End: 2024-05-24

## 2024-05-04 RX ADMIN — ONDANSETRON 4 MG: 2 INJECTION INTRAMUSCULAR; INTRAVENOUS at 17:58

## 2024-05-04 RX ADMIN — IOHEXOL 75 ML: 350 INJECTION, SOLUTION INTRAVENOUS at 18:34

## 2024-05-04 RX ADMIN — SODIUM CHLORIDE 1000 ML: 9 INJECTION, SOLUTION INTRAVENOUS at 17:58

## 2024-05-04 RX ADMIN — CEFTRIAXONE SODIUM 1 G: 1 INJECTION, SOLUTION INTRAVENOUS at 20:06

## 2024-05-04 ASSESSMENT — LIFESTYLE VARIABLES
HAVE PEOPLE ANNOYED YOU BY CRITICIZING YOUR DRINKING: NO
EVER FELT BAD OR GUILTY ABOUT YOUR DRINKING: NO
TOTAL SCORE: 0
HAVE YOU EVER FELT YOU SHOULD CUT DOWN ON YOUR DRINKING: NO
EVER HAD A DRINK FIRST THING IN THE MORNING TO STEADY YOUR NERVES TO GET RID OF A HANGOVER: NO

## 2024-05-04 ASSESSMENT — PAIN DESCRIPTION - PAIN TYPE: TYPE: ACUTE PAIN

## 2024-05-04 ASSESSMENT — PAIN SCALES - GENERAL
PAINLEVEL_OUTOF10: 0 - NO PAIN
PAINLEVEL_OUTOF10: 5 - MODERATE PAIN
PAINLEVEL_OUTOF10: 6
PAINLEVEL_OUTOF10: 0 - NO PAIN

## 2024-05-04 ASSESSMENT — COLUMBIA-SUICIDE SEVERITY RATING SCALE - C-SSRS
1. IN THE PAST MONTH, HAVE YOU WISHED YOU WERE DEAD OR WISHED YOU COULD GO TO SLEEP AND NOT WAKE UP?: NO
2. HAVE YOU ACTUALLY HAD ANY THOUGHTS OF KILLING YOURSELF?: NO
6. HAVE YOU EVER DONE ANYTHING, STARTED TO DO ANYTHING, OR PREPARED TO DO ANYTHING TO END YOUR LIFE?: NO

## 2024-05-04 ASSESSMENT — PAIN - FUNCTIONAL ASSESSMENT: PAIN_FUNCTIONAL_ASSESSMENT: 0-10

## 2024-05-04 NOTE — ED NOTES
Introduced myself, got patient another blanket. Antibiotics ordered - will start once approved by pharmacy.     Anisha Costello RN  05/04/24 [id number]

## 2024-05-05 LAB — HOLD SPECIMEN: NORMAL

## 2024-05-05 NOTE — ED PROVIDER NOTES
HPI   Chief Complaint   Patient presents with    Abdominal Pain     Right sided.       Dental Pain       HPI       44-year-old female patient arriving with left upper molar pain with a prior root canal along with right-sided upper abdominal pain after she eats fatty foods.  She ate ice cream and then had a coffee after which she began having right upper quadrant abdominal pain.  She also complained of pale stools.             No data recorded                     Patient History   Past Medical History:   Diagnosis Date    Papillomavirus as the cause of diseases classified elsewhere     HPV in female    Personal history of other complications of pregnancy, childbirth and the puerperium     History of spontaneous     Personal history of other diseases of the female genital tract     History of infertility    Personal history of other infectious and parasitic diseases     History of infection due to human papilloma virus (HPV)     Past Surgical History:   Procedure Laterality Date    OTHER SURGICAL HISTORY  2022    Tonsillectomy    OTHER SURGICAL HISTORY  2022    Cervical surgery    OTHER SURGICAL HISTORY  2022    Dilation and curettage     Family History   Problem Relation Name Age of Onset    Breast cancer Mother      Diabetes Other      Drug abuse Other      Heart disease Other       Social History     Tobacco Use    Smoking status: Never    Smokeless tobacco: Never   Vaping Use    Vaping status: Never Used   Substance Use Topics    Alcohol use: Yes    Drug use: Never       Physical Exam   ED Triage Vitals [24 1639]   Temperature Heart Rate Respirations BP   36.1 °C (97 °F) 95 16 137/89      Pulse Ox Temp Source Heart Rate Source Patient Position   99 % Temporal Monitor Sitting      BP Location FiO2 (%)     Right arm --       Physical Exam  General: Alert, no acute distress, well developed, Well hydrated  Head: NCAT  Eyes: Clear conjunctiva, EOMI  ENT: Moist mucosa, no  lymphadenopathy  Respiratory: Normal respiratory effort. CTAB. Symmetric aeration. No wheezes, rales, or Rhonchi.  CV: Normal rhythm, Normal Rate, pulses present bilaterally  GI: Soft, NT, no guarding, BS normoactive, No distention, No hernia, No palpable mass.  : no flank tenderness, no cva tenderness  MSK: Atraumatic. Full ROM in extremities. Bilateral symmetric intact strength. No pedal edema.  Skin: Warm, c/d/i  Neuro: AOx3, facial symmetry,   sensorimotor intact in extremities,   CN intact, Nonfocal neurological exam    ED Course & MDM   ED Course as of 05/17/24 1047   Sat May 04, 2024   1932 1. Mild bladder wall thickening, possibly due to incomplete  distention, although correlation with urinalysis is recommended to  exclude any underlying cystitis.  2. Otherwise, unremarkable CT of the abdomen and pelvis. No acute  abnormality is identified in the right upper quadrant.   []   2204 US shows: LIVER: 14.3 cm. Normal echogenicity, and echotexture. No focal  abnormalities.      BILE DUCTS: No intrahepatic or extrahepatic bile duct dilatation.  Common Bile Duct =   4 mm.      GALLBLADDER: No stones, pericholecystic fluid, wall thickening, or  localized tenderness.      PANCREAS: Visualized portions are within normal limits.      RIGHT KIDNEY: 10.8 cm. Normal echogenicity. Mild fullness of the  right renal pelvis.      PERITONEUM: No upper abdominal ascites.   []   2204 1. Patient's given two weeks of protonix 40 BID for gastritis.   2. Augmentin Rx sent for UTI   3. Orthodontist followup advised for her L upper molar pain []   2228 Umbilical hernia noted on imaging []      ED Course User Index  [] Ginny Alas MD         Diagnoses as of 05/17/24 1047   Urinary tract infection without hematuria, site unspecified   Gastritis without bleeding, unspecified chronicity, unspecified gastritis type   Pain, dental       Medical Decision Making  /84   Pulse 84   Temp 36.8 °C (98.2 °F)   Resp 18    "Ht 1.626 m (5' 4\")   Wt 72.6 kg (160 lb)   SpO2 97%   BMI 27.46 kg/m²   US gallbladder   Final Result   Mild fullness of the right renal pelvis. Otherwise, no acute   sonographic abnormality.        MACRO:   None.        Signed by: Evan Finkelstein 5/4/2024 9:34 PM   Dictation workstation:   [id number]      CT abdomen pelvis w IV contrast   Final Result   1. Mild bladder wall thickening, possibly due to incomplete   distention, although correlation with urinalysis is recommended to   exclude any underlying cystitis.   2. Otherwise, unremarkable CT of the abdomen and pelvis. No acute   abnormality is identified in the right upper quadrant.        MACRO:   None.        Signed by: Tona Camara 5/4/2024 7:19 PM   Dictation workstation:   [id number]        Labs Reviewed   URINE CULTURE - Abnormal       Result Value    Urine Culture 20,000 - 80,000 Staphylococcus epidermidis (*)    CBC WITH AUTO DIFFERENTIAL - Abnormal    WBC 18.9 (*)     nRBC 0.0      RBC 5.06      Hemoglobin 15.5      Hematocrit 46.1 (*)     MCV 91      MCH 30.6      MCHC 33.6      RDW 12.3      Platelets 351      Neutrophils % 79.0      Immature Granulocytes %, Automated 0.4      Lymphocytes % 12.3      Monocytes % 7.6      Eosinophils % 0.5      Basophils % 0.2      Neutrophils Absolute 14.91 (*)     Immature Granulocytes Absolute, Automated 0.07      Lymphocytes Absolute 2.31      Monocytes Absolute 1.44 (*)     Eosinophils Absolute 0.09      Basophils Absolute 0.03     COMPREHENSIVE METABOLIC PANEL - Abnormal    Glucose 97      Sodium 139      Potassium 3.5      Chloride 105      Bicarbonate 23      Anion Gap 15      Urea Nitrogen 9      Creatinine 0.69      eGFR >90      Calcium 9.8      Albumin 4.7      Alkaline Phosphatase 31 (*)     Total Protein 7.7      AST 15      Bilirubin, Total 0.4      ALT 17     URINALYSIS WITH REFLEX CULTURE AND MICROSCOPIC - Abnormal    Color, Urine Light-Yellow      Appearance, Urine Turbid (*)     " Specific Gravity, Urine 1.017      pH, Urine 6.5      Protein, Urine NEGATIVE      Glucose, Urine Normal      Blood, Urine NEGATIVE      Ketones, Urine TRACE (*)     Bilirubin, Urine NEGATIVE      Urobilinogen, Urine Normal      Nitrite, Urine NEGATIVE      Leukocyte Esterase, Urine 75 Ale/µL (*)    PROTIME-INR - Abnormal    Protime 13.7 (*)     INR 1.2 (*)    MICROSCOPIC ONLY, URINE - Abnormal    WBC, Urine 6-10 (*)     RBC, Urine 3-5      Squamous Epithelial Cells, Urine 1-9 (SPARSE)      Bacteria, Urine 1+ (*)     Mucus, Urine FEW     LIPASE - Normal    Lipase 13      Narrative:     Venipuncture immediately after or during the administration of Metamizole may lead to falsely low results. Testing should be performed immediately prior to Metamizole dosing.   LACTATE - Normal    Lactate 0.7      Narrative:     Venipuncture immediately after or during the administration of Metamizole may lead to falsely low results. Testing should be performed immediately  prior to Metamizole dosing.   HCG, URINE, QUALITATIVE - Normal    HCG, Urine NEGATIVE     URINALYSIS WITH REFLEX CULTURE AND MICROSCOPIC    Narrative:     The following orders were created for panel order Urinalysis with Reflex Culture and Microscopic.  Procedure                               Abnormality         Status                     ---------                               -----------         ------                     Urinalysis with Reflex C...[[id number]]  Abnormal            Final result               Extra Urine Gray Tube[[id number]]                            Final result                 Please view results for these tests on the individual orders.   EXTRA URINE GRAY TUBE    Extra Tube Hold for add-ons.     TYPE AND SCREEN    ABO TYPE A      Rh TYPE POS      ANTIBODY SCREEN NEG         Vital signs reassuring.  Patient is nontoxic-appearing.  Patient is in no acute distress.  Type and screen she has a positive blood.  PT/INR elevated with INR of 1.2.  UA  significant for positive leukocyte esterase and bacteriuria and pyuria for which Rocephin was given here along with Augmentin prescription for her UTI.  Lactate unremarkable at 0.7.  CBC significant for elevated leukocytosis of 18.9 consistent with her UTI.  Neutrophilia 14.91 as well.  No anemia no thrombocytopenia.  CT abdomen pelvis with IV contrast on ultrasound gallbladder done for assessment of the right upper quadrant pain.  She was found to have no choledocholithiasis and no cholecystitis.  No cholelith was noted.  US shows Normal echogenicity, and echotexture of the liver. No focal abnormalities. Bile ducts: no intrahepatic or extrahepatic bile duct dilatation. Common bile duct =   4 mm. Gallbladder: no stones, pericholecystic fluid, wall thickening, or localized tenderness. Pancreas: visualized portions are within normal limits. Right kidney: 10.8 cm. Normal echogenicity. Mild fullness of the right renal pelvis CT was significant for bladder wall thickening which is also consistent with her UTI/cystitis.  As she does not have a flagrant cholecystitis or even an observable cholelithiasis on UltraSound or CT, her symptoms of increased abdominal pain after fatty food are more consistent with gastritis for which she is given dietary guidance verbally here to avoid fatty foods avoid acidic foods and told to go to her pharmacy to  a prescription of Protonix 40 twice daily for 2 weeks.  She was advised to follow-up with her orthodontist as well for her left upper molar pain.  1. Patient's given two weeks of protonix 40 BID for gastritis.   2. Augmentin Rx sent for UTI   3. Orthodontist followup advised for her L upper molar pain  Patient is given PCP and orthodontist follow-up and discharged in stable condition with return precautions and anticipatory guidance. She expressed agreement with the plan.      External Records Reviewed: I reviewed recent and relevant outside records including: prior  Independent  Interpretation of Studies: I independently interpreted: As above  Social Determinants Affecting Care: Diagnostic testing considered: As above    I reviewed the case with the attending ER physician. Patient and/or patient´s representative was counseled regarding labs, imaging, likely diagnosis, and plan.     Ginny Alas MD MS  PGY-1, Emergency Medicine    The above documentation was completed with the use of speech recognition software. It may contain dictation errors secondary to limitations of the software.        Ginny Alas MD  Resident  05/04/24 7984    The patient was seen by the resident/fellow.  I have personally performed a substantive portion of the encounter.  I have seen and examined the patient; agree with the workup, evaluation, MDM, management and diagnosis.  The care plan has been discussed with the resident; I have reviewed the resident’s note and agree with the documented findings.       Cody Gayle, DO  05/17/24 104

## 2024-05-05 NOTE — DISCHARGE INSTRUCTIONS
It was a pleasure meeting you today  1. Begin two weeks of protonix 40 BID for gastritis.   2. Augmentin prescription is sent for urinary tract infection.   Discuss both medications with your pharmacist.  3. Follow up with your orthodontist for the Left upper molar pain.  Follow up with your PCP and return to the ER for any persistent or worsening symptoms.

## 2024-05-06 ENCOUNTER — OFFICE VISIT (OUTPATIENT)
Dept: PRIMARY CARE | Facility: CLINIC | Age: 44
End: 2024-05-06
Payer: COMMERCIAL

## 2024-05-06 VITALS
WEIGHT: 163.2 LBS | SYSTOLIC BLOOD PRESSURE: 110 MMHG | DIASTOLIC BLOOD PRESSURE: 60 MMHG | OXYGEN SATURATION: 98 % | HEART RATE: 87 BPM | HEIGHT: 64 IN | BODY MASS INDEX: 27.86 KG/M2

## 2024-05-06 DIAGNOSIS — R11.0 NAUSEA: ICD-10-CM

## 2024-05-06 DIAGNOSIS — F40.243 ANXIETY WITH FLYING: ICD-10-CM

## 2024-05-06 DIAGNOSIS — K42.9 UMBILICAL HERNIA WITHOUT OBSTRUCTION AND WITHOUT GANGRENE: Primary | ICD-10-CM

## 2024-05-06 DIAGNOSIS — Z00.00 ROUTINE HEALTH MAINTENANCE: ICD-10-CM

## 2024-05-06 PROCEDURE — 99214 OFFICE O/P EST MOD 30 MIN: CPT | Performed by: NURSE PRACTITIONER

## 2024-05-06 PROCEDURE — 93000 ELECTROCARDIOGRAM COMPLETE: CPT | Performed by: NURSE PRACTITIONER

## 2024-05-06 RX ORDER — LORAZEPAM 0.5 MG/1
0.5 TABLET ORAL AS NEEDED
Qty: 6 TABLET | Refills: 0 | Status: SHIPPED | OUTPATIENT
Start: 2024-05-06 | End: 2024-06-10 | Stop reason: WASHOUT

## 2024-05-06 RX ORDER — ONDANSETRON 8 MG/1
8 TABLET, ORALLY DISINTEGRATING ORAL EVERY 8 HOURS PRN
Qty: 20 TABLET | Refills: 1 | Status: SHIPPED | OUTPATIENT
Start: 2024-05-06 | End: 2024-05-13

## 2024-05-06 ASSESSMENT — PATIENT HEALTH QUESTIONNAIRE - PHQ9
1. LITTLE INTEREST OR PLEASURE IN DOING THINGS: NOT AT ALL
SUM OF ALL RESPONSES TO PHQ9 QUESTIONS 1 AND 2: 0
2. FEELING DOWN, DEPRESSED OR HOPELESS: NOT AT ALL

## 2024-05-06 NOTE — PROGRESS NOTES
"Subjective   Patient ID: Jocelyn Dee is a 44 y.o. female who presents for anxiety when flying and RUQ pain (Patient was previously seen in ER for what she thought was a gallbladder attack. ).    Fear of flying. Uses xanxa sparingly. Had been need 1 mg PRN for flights and while in new location. Only uses 6 pills per year. Will need to increase dose. She will take 2 tabs PRN.    Was in the ER and found to have UTI. Urine culture is not back. Is on Augmentin.  Having reflux symptoms since being on hormones for planned IVF for the past month. Is talking with GYN about this.           Review of Systems  otherwise negative aside from what was mentioned above in HPI.    Objective   /60 (BP Location: Left arm, Patient Position: Sitting)   Pulse 87   Ht 1.626 m (5' 4\")   Wt 74 kg (163 lb 3.2 oz)   SpO2 98%   BMI 28.01 kg/m²     Physical Exam  Constitutional:       Appearance: Normal appearance.   Cardiovascular:      Rate and Rhythm: Normal rate and regular rhythm.   Pulmonary:      Effort: Pulmonary effort is normal.      Breath sounds: Normal breath sounds.   Abdominal:      General: Abdomen is flat.   Musculoskeletal:         General: Normal range of motion.   Skin:     General: Skin is warm.   Neurological:      General: No focal deficit present.      Mental Status: She is alert and oriented to person, place, and time. Mental status is at baseline.   Psychiatric:         Mood and Affect: Mood normal.         Behavior: Behavior normal.         Thought Content: Thought content normal.         Judgment: Judgment normal.         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 Needs to be on for 1 more month. Providing Zofran to use PRN.   She will discuss with fertility provider.         Relevant Medications    ondansetron ODT (Zofran-ODT) 8 mg disintegrating tablet     Other Visit Diagnoses         Codes    Routine health maintenance     Z00.00    Relevant Orders    ECG 12 lead (Clinic Performed) (Completed)

## 2024-05-06 NOTE — ASSESSMENT & PLAN NOTE
Likely from medications. Needs to be on for 1 more month. Providing Zofran to use PRN.   She will discuss with fertility provider.

## 2024-05-07 ENCOUNTER — DOCUMENTATION (OUTPATIENT)
Dept: ENDOCRINOLOGY | Facility: CLINIC | Age: 44
End: 2024-05-07
Payer: COMMERCIAL

## 2024-05-07 NOTE — PROGRESS NOTES
NEGAR Berg MD; Adriana Johnson MD; Aye Teresa MD; Rosie Rivero, RN; Catie Melchor, RN  Thank you, I updated the patient.  ARTHUR ROSE on 5/3/24 at 7:41 AM.             Previous Messages       ----- Message -----  From: Jaymie Chong MD  Sent: 5/2/2024   1:33 PM EDT  To: Aye Teresa MD; Rosie Rivero, RN; *  Subject: RE: Update                                      Thanks for the update.    She can schedule her HSG at any time.    Would recommend that she bring up her upper abdominal pain with her PCP or GI--if worsens we may need to stop Aygestin if they are temporally related    She should follow up after everything is complete so we can move forward with treatment    ----- Message -----  From: Arthur Rose RN  Sent: 4/30/2024  10:14 AM EDT  To: Aye Teresa MD; Jaymie Chong MD; *  Subject: Update                                          Jocelyn called today to give the team an update. Including primary RN and fellows (as Dr. Chong I out). She wanted Dr. Chong to be aware of all of this information.    1. I reviewed with her the my chart sent by Dr. Teresa regarding her recent blood work. She state she does not recall if she had fasted for her lipid panel but is going to follow up with her PCP. She believes that it is diet related and is working with her nutritionist (who is not concerned about her cholesterol).    2. She is going to follow up with her PCP regarding her EKG which she has not yet completed.    3. Can she call at any point to schedule her HSG? She does not get menses and is on x2 months of Aygestin started beginning of April.    4. She states that she started her Aygestin beginning of April and takes before bed. She states recently that she has been waking up at night feeling burning/discomfort in her gallbladder/liver area (under right rib cage) that has become more prominent. She will continue taking aygestin but  wanted to mention it. Unsure if this is even related.    5. She wanted to updated Dr. Chong that she has not been taking estrogen cream prescribed by urologist.    6. She states that she has an infection and is requiring a root canal next Tuesday.    7. She states she has been taking Zoloft prescribed by Dr. Byrnes and it has been helping.    8. She states she has a colonoscopy scheduled for July.    Please let me know if any of the above areas need following up by myself.  ARTHUR AMOS on 4/30/24 at 10:14 AM.

## 2024-05-08 LAB — BACTERIA UR CULT: ABNORMAL

## 2024-05-09 ENCOUNTER — TELEPHONE (OUTPATIENT)
Dept: PHARMACY | Facility: HOSPITAL | Age: 44
End: 2024-05-09
Payer: COMMERCIAL

## 2024-05-09 NOTE — PROGRESS NOTES
EDPD Note: Antibiotics Reviewed and Warranted    Contacted Ms. Jocelyn Dee regarding a positive urine culture that was taken during their recent emergency room visit. I completed education with patient . The patient is being treated appropriately. She was discharged from the ED with Augmentin 875-125 mg BID x 7 days. However, she recently had dental work done and is on a course of amoxicillin, which is also appropriate per susceptibilities. She did report that she was having urinary symptoms and that they are improving. She will follow up with her PCP if her symptoms do not resolve after completion of amoxicillin regimen.     Susceptibility data from last 90 days.  Collected Specimen Info Organism Nitrofurantoin Oxacillin Trimethoprim/Sulfamethoxazole Vancomycin   05/04/24 Urine from Clean Catch/Voided Staphylococcus epidermidis S S S S        No further follow up needed from EDPD Team.     Joy Pathak, PharmD

## 2024-05-21 ENCOUNTER — APPOINTMENT (OUTPATIENT)
Dept: PRIMARY CARE | Facility: CLINIC | Age: 44
End: 2024-05-21
Payer: COMMERCIAL

## 2024-05-24 ENCOUNTER — OFFICE VISIT (OUTPATIENT)
Dept: PRIMARY CARE | Facility: CLINIC | Age: 44
End: 2024-05-24
Payer: COMMERCIAL

## 2024-05-24 VITALS
HEART RATE: 72 BPM | DIASTOLIC BLOOD PRESSURE: 82 MMHG | WEIGHT: 164 LBS | SYSTOLIC BLOOD PRESSURE: 125 MMHG | OXYGEN SATURATION: 96 % | BODY MASS INDEX: 28.15 KG/M2

## 2024-05-24 DIAGNOSIS — B37.31 VAGINAL CANDIDIASIS: ICD-10-CM

## 2024-05-24 DIAGNOSIS — R39.9 UTI SYMPTOMS: ICD-10-CM

## 2024-05-24 DIAGNOSIS — R39.15 URINARY URGENCY: Primary | ICD-10-CM

## 2024-05-24 PROBLEM — M62.89 PELVIC FLOOR DYSFUNCTION IN FEMALE: Status: ACTIVE | Noted: 2024-05-24

## 2024-05-24 PROBLEM — B99.9: Status: ACTIVE | Noted: 2024-04-22

## 2024-05-24 PROBLEM — N32.89 BLADDER SPASM: Status: ACTIVE | Noted: 2024-05-24

## 2024-05-24 PROBLEM — N96 RECURRENT PREGNANCY LOSS: Status: ACTIVE | Noted: 2024-04-22

## 2024-05-24 LAB
POC APPEARANCE, URINE: CLEAR
POC BILIRUBIN, URINE: NEGATIVE
POC BLOOD, URINE: NEGATIVE
POC COLOR, URINE: YELLOW
POC GLUCOSE, URINE: NEGATIVE MG/DL
POC KETONES, URINE: NEGATIVE MG/DL
POC LEUKOCYTES, URINE: NEGATIVE
POC NITRITE,URINE: NEGATIVE
POC PH, URINE: 7 PH
POC PROTEIN, URINE: NEGATIVE MG/DL
POC SPECIFIC GRAVITY, URINE: 1.02
POC UROBILINOGEN, URINE: 0.2 EU/DL

## 2024-05-24 PROCEDURE — 87186 SC STD MICRODIL/AGAR DIL: CPT

## 2024-05-24 PROCEDURE — 87086 URINE CULTURE/COLONY COUNT: CPT

## 2024-05-24 PROCEDURE — 99213 OFFICE O/P EST LOW 20 MIN: CPT | Performed by: INTERNAL MEDICINE

## 2024-05-24 PROCEDURE — 81003 URINALYSIS AUTO W/O SCOPE: CPT | Performed by: INTERNAL MEDICINE

## 2024-05-24 RX ORDER — NITROFURANTOIN 25; 75 MG/1; MG/1
100 CAPSULE ORAL 2 TIMES DAILY
Qty: 14 CAPSULE | Refills: 0 | Status: SHIPPED | OUTPATIENT
Start: 2024-05-24 | End: 2024-05-31

## 2024-05-24 RX ORDER — FLUCONAZOLE 150 MG/1
150 TABLET ORAL ONCE
Qty: 1 TABLET | Refills: 0 | Status: SHIPPED | OUTPATIENT
Start: 2024-05-24 | End: 2024-05-24

## 2024-05-24 NOTE — PROGRESS NOTES
Subjective   Patient ID: Jocelyn Dee is a 44 y.o. female who presents for Possible UTI (Patient is here for a possible UTI. Patient has bene on Amoxicillin for a previous UTI, but she is still having symptoms. ).    Pt c/o 5 days of frequency,urgency,dysuria,no blood in urine,she was treated for  UTI with Amoxicillin,she recently returned from a trip and was not drinking enough water.         Review of Systems   Genitourinary:         As HPI.       Objective   /82   Pulse 72   Wt 74.4 kg (164 lb)   SpO2 96%   BMI 28.15 kg/m²     Physical Exam  Constitutional:       Appearance: Normal appearance.   HENT:      Head: Normocephalic and atraumatic.   Eyes:      Extraocular Movements: Extraocular movements intact.      Pupils: Pupils are equal, round, and reactive to light.   Cardiovascular:      Rate and Rhythm: Normal rate and regular rhythm.      Heart sounds: Normal heart sounds.   Pulmonary:      Effort: Pulmonary effort is normal.      Breath sounds: Normal breath sounds. No wheezing or rhonchi.   Abdominal:      General: Abdomen is flat. Bowel sounds are normal. There is no distension.      Palpations: Abdomen is soft.      Tenderness: There is no right CVA tenderness or left CVA tenderness.   Musculoskeletal:         General: Normal range of motion.      Cervical back: Normal range of motion and neck supple.      Right lower leg: No edema.      Left lower leg: No edema.   Skin:     General: Skin is warm.   Neurological:      General: No focal deficit present.      Mental Status: She is alert and oriented to person, place, and time.   Psychiatric:         Mood and Affect: Mood normal.         Behavior: Behavior normal.         Assessment/Plan   Problem List Items Addressed This Visit             ICD-10-CM    Urinary urgency - Primary R39.15     Will start on Macrobid and send urine for culture.  Also will add Diflucan for possible yeast infection.          Other Visit Diagnoses         Codes    UTI  symptoms     R39.9    Relevant Medications    nitrofurantoin, macrocrystal-monohydrate, (Macrobid) 100 mg capsule    Other Relevant Orders    POCT UA Automated manually resulted (Completed)    Urine Culture    Vaginal candidiasis     B37.31

## 2024-05-24 NOTE — ASSESSMENT & PLAN NOTE
Will start on Macrobid and send urine for culture.  Also will add Diflucan for possible yeast infection.

## 2024-05-27 LAB — BACTERIA UR CULT: ABNORMAL

## 2024-05-27 NOTE — RESULT ENCOUNTER NOTE
Your urine grew bacteria,please finish up the prescribed antibiotic Nitrofurantoin till all gone,call if your symptoms persist.

## 2024-05-28 NOTE — PROGRESS NOTES
Reason for Nutrition Visit:  Pt is a 44 y.o. female being seen at [facility] in [place] referred for   1. Irritable bowel syndrome with both constipation and diarrhea        2. Celiac disease ([facility])           Medication Documentation Review Audit       Reviewed by SIL Malloy (Nurse Practitioner) on 24 at 1420      Medication Order Taking? Sig Documenting Provider Last Dose Status   cholecalciferol (Vitamin D-3) 25 MCG (1000 UT) capsule [id number] Yes Take 1 capsule (25 mcg) by mouth once daily. Historical Provider, MD Taking Active   estradiol (Estrace) 0.01 % (0.1 mg/gram) vaginal cream [id number] No Apply daily 1 gram apply for vagina for 3 weeks, then 3 times per week.   Patient not taking: Reported on 2024    SIL Brand Not Taking Active   fluconazole (Diflucan) 150 mg tablet [id number]  Take 1 tablet (150 mg) by mouth 1 time for 1 dose. Chanda Addison MD   24 [id number]   fluticasone (Flonase) 50 mcg/actuation nasal spray [id number] No Administer 1 spray into each nostril once daily. Shake gently. Before first use, prime pump. After use, clean tip and replace cap.   Patient not taking: Reported on 2024    Chanda Addison MD Not Taking Active   hydrOXYzine HCL (Atarax) 25 mg tablet [id number] Yes Take 1 tablet (25 mg) by mouth 2 times a day.   Patient taking differently: Take 1 tablet (25 mg) by mouth 2 times a day as needed.    Chanda Addison MD Taking Active   levothyroxine (Synthroid, Levoxyl) 50 mcg tablet [id number] Yes Take 1 tablet (50 mcg) by mouth once daily. Jaymie Chong MD Taking Active   lidocaine-prilocaine (Emla) 2.5-2.5 % cream [id number] No APPLY TO TREATMENT AREA 1 HOUR PRIOR TO INJECTION.   Patient not taking: Reported on 2024    SIL Ulloa Not Taking Active   LORazepam (Ativan) 0.5 mg tablet [id number] Yes Take 1 tablet (0.5 mg) by mouth if needed for anxiety (flight anxiety; take prior to flight) for up to  6 doses. Priya Pennington, APRN-CNP Taking Active   methen-m.blue-s.phos-phsal-hyo (UribeL) 118-10-40.8-36 mg capsule [id number]  Take 1 capsule by mouth 3 times a day as needed (PRN bladder spasm). Lupe Barragan, APRN-CNP  Active   nitrofurantoin, macrocrystal-monohydrate, (Macrobid) 100 mg capsule [id number] Yes Take 1 capsule (100 mg) by mouth 2 times a day for 7 days. Chanda Addison MD Taking Active   pantoprazole (ProtoNix) 20 mg EC tablet [id number]  Take 2 tablets (40 mg) by mouth 2 times a day for 14 days. Do not crush, chew, or split. Ginny Alas MD   24 2359   peg-sod sul-NaCl-KCl-asb-C (MoviPrep) 100-7.5-2.691 gram solution [id number] No Schedule the initial dose the evening before colonoscopy, followed by a second dose the morning of procedure (2 day regimen) or ~ 90 minutes after starting dose (1 day regimen).   Patient not taking: Reported on 2024    Elodia Zhu MD Not Taking Active   prenat.vits,shellie,min-iron-folic tablet [id number] Yes Take 1 tablet by mouth once daily. Historical Provider, MD Taking Active   progesterone (Prometrium) 200 mg capsule [id number] No Take 1 capsule (200 mg) by mouth. TAKE 1 CAPSULE BY MOUTH EVERY DAY FOR THE FIRST 12 DAYS OF EACH MONTH Historical Provider, MD Not Taking Active   progesterone 50 mg/mL injection [id number] No DRAW UP 2 ML (100 MG) AND INJECT INTO THE MUSCLE AT THE SAME TIME EACH MORNING AS DIRECTED PER PROVIDER.   Patient not taking: Reported on 2024    Jaymie Chong MD Not Taking Active   sertraline (Zoloft) 50 mg tablet [id number] Yes Take 1 tablet (50 mg) by mouth once daily. Chanda Addison MD Taking Active   Soolantra 1 % cream [id number] Yes 1 Application once daily. APPLY TOPICALLY TO FACE 1 TIME IN THE MORNING Historical Provider, MD Taking Active                   Past Medical Hx:  Patient Active Problem List   Diagnosis    Abnormal mammogram    LEELEE positive    Anxiety with flying    Cervical radiculopathy, acute     Early menopause    Hand tingling    History of loop electrical excision procedure (LEEP)    Hyperlipidemia    Low back pain    Lumbosacral radiculitis    Neck pain    Other specified functional intestinal disorders    Paresthesia    Premature ovarian failure    Psychological and behavioral factors associated with disorders or diseases classified elsewhere    Seborrheic dermatitis, unspecified    Skin sensation disturbance    Subclinical hypothyroidism    Tingling of both feet    Urinary urgency    Tingling in extremities    Acute pain in female pelvis    Cervical spondylosis without myelopathy    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    Dysmenorrhea    Female infertility    Lumbosacral spondylosis without myelopathy    Acute respiratory infection    Acute cough    Arthralgia    History of elevated lipids    History of infection due to human papilloma virus (HPV)    Human papilloma virus (HPV) infection    Tingling of skin    Undifferentiated connective tissue disease ([facility])    Lipoma of left lower extremity    Chronic pansinusitis    Irritable bowel syndrome with both constipation and diarrhea    Celiac disease ([facility])    Umbilical hernia without obstruction and without gangrene    Nausea    Pelvic floor dysfunction in female    Chronic infectious disease    Bladder spasm    Recurrent pregnancy loss      Weight change:    164# (5/24/24)  162# (4/19/24)  150# (lowest weight, 9/2023)  153-156# (1/2023-6/2023)  161# (12/2022)  166# (11/2022)  168-171# (1/2022-4/2022)  Significant Weight Change: No    Lab Results   Component Value Date    HGBA1C 5.2 04/22/2024    CHOL 212 (H) 04/22/2024    LDLCALC 150 (H) 04/22/2024    TRIG 84 04/22/2024    HDL 45.7 04/22/2024    LDLF 109 (H) 01/11/2023      Food and Nutrition Hx:  She is having this follow up for digestive issues, we met a month ago about IBS/celiac. She doesn't have celiac but is a carrier for the gene.     Update 5/29:  Seeing GI today  Didn't  have a chance to try FODMAP, but she has been eating more vegetables/fiber and feels that her digestion is better, so she might not need to try FODMAP. One benefit she might get out of it is the re-introduction phase w/ gluten-containing oligios to establish how much she can tolerate.   Has been eating within 2-3 hours of waking up, at our last visit she had been fasting until about 10.   Continues to eat gluten free foods for the most part  Was in the ER with gastritis - she thinks it was from progesterone.  Is no longer on progesterone.   If she eats acidic foods she feels like it bothers interstitial cystitis.   Tried lactose-free dairy, which she thinks is helpful.   Has been eating more vegetables.   Had a bone density test - she said she doesn't know the result, but it was a baseline test.    Continues to juice.     Allergies: None  Intolerance: lactose, sugar, alcohol, and gluten-containing foods.   GI Symptoms : loose stools more often than constipation. Reports her stools have never been regular, dating back to childhood. Denies reflux. GI symptoms are improved 5/29 compared to last month.     Swallowing Difficulty: No problems with swallowing  Chewing: no problems chewing    Food Preparation: Patient  Grocery Shopping: Patient  No difficulty affording food,   Dining out: did not discuss    Relationship with Food:   Appetite: Good - appetite is more moderated now that she is eating breakfast.    Binging: not discussed 5/29 but at initial visit she reported she has binge episodes at times.     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 Enjoys swimming but hasn't been doing it lately - PT exercises for her back, leisure swimming, sauna.     Sleep: did not discuss, although she states she feels tired after eating.     Dietary Supplements: Multizyme (includes pancreatin, amylase, cellulase, lipase and other  "ingredients), Tumeric/fenugreek, Methyl B12, Cyruta (contains 3 mg vitamin C and herbal supplements), EZMg - she said she could not continue taking this because she had an \"anxiety attack\" when she took it. She also takes an iron-free prenatal vitamin.       Nutrition Focused Physical Exam:  Performed/Deferred: Deferred as pt visually appears well-nourished with no signs of malnutrition    Nutrition Diagnosis:  Diagnosis Statement 1:  Diagnosis Status: Ongoing / Improved. She reports that continued limiting of gluten and a low-lactose diet are helping her tolerate her diet.   Diagnosis : Altered GI function  related to  unclear etiology  as evidenced by  patient reports irregular stool consistency/frequency.     Nutrition Interventions:  Via teach back method patient verbalized understanding of the following topics:  Nutrition education on the following diet topic(s): Calcium-rich foods, trial of FODMAP re-introduction of gluten-containing oligios  Coordination of Care: None    Education:  Since she is now limiting lactose, we discussed alternate sources of calcium. She doesn't seem to consume those foods on a regular/adequate basis, so we talked about consideration of adding calcium supplements. Advised she should talk with her doctor before starting to take them, but that taking 1-2 per day is probably going to be the recommended level to fill the gap and make sure she is consuming about 1200 mg calcium per day.   If her GI function seems improved, advised she doesn't need to go through with the low-FODMAP diet and re-introduction of FODMAP foods. However it still seems unclear how much gluten-containing oligios foods she can tolerate, so we discussed she could try re-introducing them methodically with the sample meal plan in the future if she desires.     Monitoring & Evaluation:  Monitoring & Evaluation: Amount of Food - Estimated, Types of Food, Meal/Snack Pattern - Estimated, and Diet Experience: Food " Intolerance    Nutrition Goals:  If she feels unsatisfied with her GI symptoms, she will try a low-FODMAP diet for 1-2 weeks and then spend a week re-introducing the gluten-free oligios gradually to determine her tolerance of these foods.   She will continue to eat breakfast so that meals are regularly paced and she isn't becoming overly hungry for meals  She will include calcium-rich foods in her diet and might consider adding calcium supplements if she is not likely eating 1200 mg calcium per day.     Handouts:  Calcium-rich foods, low lactose diet (includes calcium rich foods without lactose)    Follow up: PRN    Readiness to Change : Good  Level of Understanding : Good  Anticipated Compliant : Good

## 2024-05-29 ENCOUNTER — NUTRITION (OUTPATIENT)
Dept: PRIMARY CARE | Facility: CLINIC | Age: 44
End: 2024-05-29
Payer: COMMERCIAL

## 2024-05-29 ENCOUNTER — OFFICE VISIT (OUTPATIENT)
Dept: GASTROENTEROLOGY | Facility: CLINIC | Age: 44
End: 2024-05-29
Payer: COMMERCIAL

## 2024-05-29 VITALS
DIASTOLIC BLOOD PRESSURE: 85 MMHG | SYSTOLIC BLOOD PRESSURE: 121 MMHG | HEART RATE: 88 BPM | WEIGHT: 164 LBS | BODY MASS INDEX: 28.15 KG/M2 | TEMPERATURE: 98.1 F

## 2024-05-29 DIAGNOSIS — K58.2 IRRITABLE BOWEL SYNDROME WITH BOTH CONSTIPATION AND DIARRHEA: Primary | ICD-10-CM

## 2024-05-29 DIAGNOSIS — K90.0 CELIAC DISEASE (HHS-HCC): ICD-10-CM

## 2024-05-29 DIAGNOSIS — K58.2 IRRITABLE BOWEL SYNDROME WITH BOTH CONSTIPATION AND DIARRHEA: ICD-10-CM

## 2024-05-29 PROCEDURE — 97803 MED NUTRITION INDIV SUBSEQ: CPT | Performed by: DIETITIAN, REGISTERED

## 2024-05-29 PROCEDURE — 99213 OFFICE O/P EST LOW 20 MIN: CPT | Performed by: NURSE PRACTITIONER

## 2024-05-29 PROCEDURE — 1036F TOBACCO NON-USER: CPT | Performed by: NURSE PRACTITIONER

## 2024-05-29 PROCEDURE — 99203 OFFICE O/P NEW LOW 30 MIN: CPT | Performed by: NURSE PRACTITIONER

## 2024-05-29 ASSESSMENT — ENCOUNTER SYMPTOMS
DIAPHORESIS: 0
NEUROLOGICAL NEGATIVE: 1
CARDIOVASCULAR NEGATIVE: 1
ROS GI COMMENTS: SEE HPI
COUGH: 0
WHEEZING: 0
ENDOCRINE NEGATIVE: 1
DEPRESSION: 0
HEMATOLOGIC/LYMPHATIC NEGATIVE: 1
APNEA: 0
ALLERGIC/IMMUNOLOGIC NEGATIVE: 1
RESPIRATORY NEGATIVE: 1
FATIGUE: 0
SHORTNESS OF BREATH: 0
STRIDOR: 0
MUSCULOSKELETAL NEGATIVE: 1
PSYCHIATRIC NEGATIVE: 1
FEVER: 0
CHEST TIGHTNESS: 0
CHILLS: 0
DIFFICULTY URINATING: 0
EYES NEGATIVE: 1

## 2024-05-29 ASSESSMENT — PAIN SCALES - GENERAL: PAINLEVEL: 4

## 2024-05-29 NOTE — PROGRESS NOTES
Subjective   Patient ID: Jocelyn Dee is a 44 y.o. female who presents for Irritable Bowel Syndrome. PMH: HLD, IBS-mixed, celiac gene carrier    She has a long h/o constipation/diarrhea  Constipated entire life  She was previously seen by functional medicine  She was told she has IBS   Hormonal replacement made it worse    She has a BM daily, sometimes she will 3 soft stools daily, sometimes has watery stools, sometimes mucous  Sometimes has BSS 1  Had OTC stool softener in the past  She has tried prunes and water    Has intermittent bright red blood, feels hemorrhoids    Colonoscopy ordered    Had a prior EGD and reports she did not have celiac disease     Family Hx: Denies a family hx of CRC, GI cancers    Review of Systems   Constitutional:  Negative for chills, diaphoresis, fatigue and fever.   HENT: Negative.     Eyes: Negative.    Respiratory: Negative.  Negative for apnea, cough, chest tightness, shortness of breath, wheezing and stridor.    Cardiovascular: Negative.    Gastrointestinal:         See HPI    Endocrine: Negative.    Genitourinary: Negative.  Negative for difficulty urinating.   Musculoskeletal: Negative.    Skin: Negative.    Allergic/Immunologic: Negative.    Neurological: Negative.    Hematological: Negative.    Psychiatric/Behavioral: Negative.         Objective   Physical Exam  Constitutional:       Appearance: Normal appearance. She is normal weight.   HENT:      Nose: Nose normal.   Eyes:      General: Lids are normal.   Cardiovascular:      Rate and Rhythm: Normal rate and regular rhythm.      Heart sounds: Normal heart sounds.   Pulmonary:      Effort: Pulmonary effort is normal.      Breath sounds: Normal breath sounds.   Abdominal:      General: Bowel sounds are normal.   Musculoskeletal:         General: Normal range of motion.   Skin:     General: Skin is warm and dry.   Neurological:      Mental Status: She is alert and oriented to person, place, and time.   Psychiatric:          Mood and Affect: Mood normal.         Assessment/Plan   Diagnoses and all orders for this visit:  Irritable bowel syndrome with both constipation and diarrhea  -     Celiac Panel; Future    45 yo female HLD, IBS-mixed, celiac gene carrier who presents today for IBS constipation/diarrhea.  She will start benefiber daily. She will consider a lactose free diet and gluten free diet. She was scheduled for a colonoscopy and will reschedule. She will complete celiac panel and if abnormal will complete EGD with Colonoscopy.   Follow-up after procedure(s).       SIL Malloy 05/29/24 1:50 PM

## 2024-05-29 NOTE — PATIENT INSTRUCTIONS
Start benefiber one teaspoon daily with a full glass of water x 2 weeks    Consider a lactose free diet x 2 weeks    Consider a gluten free diet x 2 weeks           Patient Education:    Constipation refers to a change in bowel habits, but it has varied meanings. Stools may be too hard or too small, difficult to pass, or infrequent (less than three times per week). People with constipation may also notice a frequent need to strain and a sense that the bowels are not empty.    Your bowels like consistency and routine.     Behavior changes -- The bowels are most active following meals, and this is often the time when stools will pass most readily. If you ignore your body's signals to have a bowel movement, the signals become weaker and weaker over time.    By paying close attention to these signals, you may have an easier time moving your bowels. Drinking a caffeine-containing beverage in the morning may also be helpful.    Increase fiber -- Increasing fiber in your diet may reduce or eliminate constipation. The recommended amount of dietary fiber is 20 to 35 grams of fiber per day. Examples of high fiber foods include pears, spinach, apples, raspberries.     Fiber side effects -- Consuming large amounts of fiber can cause abdominal bloating or gas; this can be minimized by starting with a small amount and slowly increasing until stools become softer and more frequent.    More recently, kiwi fruit has been identified as helping with constipation. It is recommended to eat two daily.    Whatever you decide to use, please try daily for 2 weeks to notice improvement.     If you continue to have constipation despite trying these methods, please schedule a follow-up appointment.

## 2024-05-29 NOTE — PATIENT INSTRUCTIONS
Since she is now limiting lactose, we discussed alternate sources of calcium. She doesn't seem to consume those foods on a regular/adequate basis, so we talked about consideration of adding calcium supplements. Advised she should talk with her doctor before starting to take them, but that taking 1-2 per day is probably going to be the recommended level to fill the gap and make sure she is consuming about 1200 mg calcium per day.   If her GI function seems improved, advised she doesn't need to go through with the low-FODMAP diet and re-introduction of FODMAP foods. However it still seems unclear how much gluten-containing oligios foods she can tolerate, so we discussed she could try re-introducing them methodically with the sample meal plan in the future if she desires.

## 2024-05-31 ENCOUNTER — OFFICE VISIT (OUTPATIENT)
Dept: OBSTETRICS AND GYNECOLOGY | Facility: CLINIC | Age: 44
End: 2024-05-31
Payer: COMMERCIAL

## 2024-05-31 VITALS — WEIGHT: 163 LBS | BODY MASS INDEX: 27.83 KG/M2 | HEIGHT: 64 IN

## 2024-05-31 DIAGNOSIS — N94.6 DYSMENORRHEA: Primary | ICD-10-CM

## 2024-05-31 DIAGNOSIS — F41.9 ANXIETY: ICD-10-CM

## 2024-05-31 PROCEDURE — 99214 OFFICE O/P EST MOD 30 MIN: CPT | Performed by: STUDENT IN AN ORGANIZED HEALTH CARE EDUCATION/TRAINING PROGRAM

## 2024-05-31 RX ORDER — SERTRALINE HYDROCHLORIDE 50 MG/1
50 TABLET, FILM COATED ORAL DAILY
Qty: 90 TABLET | Refills: 0 | Status: SHIPPED | OUTPATIENT
Start: 2024-05-31 | End: 2024-08-29

## 2024-05-31 ASSESSMENT — PAIN SCALES - GENERAL: PAINLEVEL: 0-NO PAIN

## 2024-05-31 NOTE — PROGRESS NOTES
Division of Minimally Invasive Gynecologic Surgery  [facility]    24 Gynecology Consult     HISTORY OF PRESENT ILLNESS:  Jocelyn Dee 44 y.o. presents to discuss dysmenorrhea and possible endometriosis. Currently undergoing fertility treatments w/ Dr. Chong.     She is undergoing fertility treatments w/ Dr. Chong. She has had 2 failed rounds of IVF.     She has had a med Ab and one miscarriage. After this, her menses started to get heavier. She tried HRT for this. She had atypical bleeding with this.     She has not had an HSG. She does have donor eggs    Long hx of dysmenorrhea. She does not typically miss work for this, but does have a history of emesis w/ menses. No dyschezia, dysuria, dyspareunia. Menses are now heavy, with passage of clots.     She did try SABRINA which did help w/ dysmenorrhea. Interestingly when she was on HRT w/ estrogen and monthly cycle progesterone symptoms were worse. She has also tried Aygestin, but got gastritis with this.    Per pt, Dr. Chong saw an endometrioma on ultrasound in office.      Labs:   - Recent CMP WNL  - Recent CBC w/ leukocytosis to 18.9, otherwise WNL   - Recent A1C and TSH WNL   Screening:   - Last pap 2024 negative/negative  - Last mammogram 2023 BIRADS 2   PMHx: umbilical hernia, health anxiety/depression   PSHx: LEEP, CKC, tonsillectomy     PAST MEDICAL HISTORY:  Past Medical History:   Diagnosis Date    Papillomavirus as the cause of diseases classified elsewhere     HPV in female    Personal history of other complications of pregnancy, childbirth and the puerperium     History of spontaneous     Personal history of other diseases of the female genital tract     History of infertility    Personal history of other infectious and parasitic diseases     History of infection due to human papilloma virus (HPV)       PAST SURGICAL HISTORY:  Past Surgical History:   Procedure Laterality Date    OTHER  "SURGICAL HISTORY  01/11/2022    Tonsillectomy    OTHER SURGICAL HISTORY  01/11/2022    Cervical surgery    OTHER SURGICAL HISTORY  04/07/2022    Dilation and curettage       PHYSICAL EXAMINATION:  VITAL SIGNS: Ht 1.626 m (5' 4\")   Wt 73.9 kg (163 lb)   LMP  (LMP Unknown)   BMI 27.98 kg/m²      Constitutional:  No acute distress, well-nourished and well-developed  HEENT: EOM grossly intact, MMM, neck supple and with full ROM  Pulm:  Effort normal. No accessory muscle usage.  No respiratory distress.  Neurological:  She is alert and oriented to person place and time.  Skin: Warm, no pallor.  Psychiatric:  She has normal mood and affect.    ASSESSMENT:  Jocelyn Dee 44 y.o. presents to discuss dysmenorrhea and possible endometriosis. Currently undergoing fertility treatments w/ Dr. Chong.   - We discussed today the multiple etiologies of chronic pelvic pain, including endometriosis, adenomyosis, GI etiologies, MSK etiologies, and centralization of pain. I am primarily suspicious of endometriosis in her case. We discussed the role of surgical excision in the management of endometriosis and the estimated 15-20% recurrence rate in the future.   - We discussed the natural history of endometriosis and the fact that hormonal suppression is thought to have a role in slowing disease progression and managing symptoms of endometriosis, but cannot definitively reverse or eliminate endometriosis.   - We discussed that surgical excision of endometriosis (if it is in fact present) does not guarantee fertility, though for some patients it can increase the chances. I would however recommend deferring to Dr. Chong's expertise with regard to the order of interventions and recommended interventions in the course of her fertility treatment plan.   - She is considering surgical excision as she has wondered for many years if she has endometriosis. She would like to know definitively and is hopeful that her dysmenorrhea would " improve following excision of endometriosis is it is present.     PLAN:  - Plan for MRI pelvis and planned HSG w/ Dr. Chong  - If HSG normal, we can consider excisional surgery pending Dr. Chong's input     Nona Reyna MD  05/31/24  1:39 PM

## 2024-06-05 ENCOUNTER — TELEPHONE (OUTPATIENT)
Dept: OBSTETRICS AND GYNECOLOGY | Facility: CLINIC | Age: 44
End: 2024-06-05

## 2024-06-05 ENCOUNTER — TELEPHONE (OUTPATIENT)
Dept: ENDOCRINOLOGY | Facility: CLINIC | Age: 44
End: 2024-06-05

## 2024-06-05 DIAGNOSIS — N94.6 DYSMENORRHEA: ICD-10-CM

## 2024-06-05 NOTE — TELEPHONE ENCOUNTER
Reason for call:   Notes:  Patient wants to schedule a Hsg but does not have a cycle. Please let me know if this is okay and I will call her to schedule thxs

## 2024-06-10 ENCOUNTER — OFFICE VISIT (OUTPATIENT)
Dept: ALLERGY | Facility: CLINIC | Age: 44
End: 2024-06-10
Payer: COMMERCIAL

## 2024-06-10 VITALS
HEIGHT: 64 IN | HEART RATE: 74 BPM | WEIGHT: 165 LBS | OXYGEN SATURATION: 97 % | BODY MASS INDEX: 28.17 KG/M2 | SYSTOLIC BLOOD PRESSURE: 112 MMHG | DIASTOLIC BLOOD PRESSURE: 74 MMHG | TEMPERATURE: 98.6 F | RESPIRATION RATE: 17 BRPM

## 2024-06-10 DIAGNOSIS — B99.9 CHRONIC INFECTION: Primary | ICD-10-CM

## 2024-06-10 PROCEDURE — 90732 PPSV23 VACC 2 YRS+ SUBQ/IM: CPT | Performed by: ALLERGY & IMMUNOLOGY

## 2024-06-10 PROCEDURE — 90471 IMMUNIZATION ADMIN: CPT | Performed by: ALLERGY & IMMUNOLOGY

## 2024-06-10 PROCEDURE — 99213 OFFICE O/P EST LOW 20 MIN: CPT | Performed by: ALLERGY & IMMUNOLOGY

## 2024-06-10 NOTE — PROGRESS NOTES
Patient ID: Jocelyn Dee is a 44 y.o. female.     Chief Complaint: follow up labs  History Of Present Illness  Jocelyn Dee is a 44 y.o. female with PMx recurrent infections presenting for follow up.   She is undergoing work up for her immune system due to a history of recurrent pregnancy loss.        Food Allergy  No    Eczema/ Atopic Dermatitis  No    Asthma  No    Rhinoconjunctivitis  No    Drug Allergy   No    Insect Allergy   No    Infections  No history of frequent or recurrent infections      Review of Systems    Pertinent positives and negatives have been assessed in the HPI. All other systems have been reviewed and are negative except as noted in the HPI.    Allergies  Patient has no known allergies.    Past Medical History  She has a past medical history of Papillomavirus as the cause of diseases classified elsewhere, Personal history of other complications of pregnancy, childbirth and the puerperium, Personal history of other diseases of the female genital tract, and Personal history of other infectious and parasitic diseases.    Family History  Family History   Problem Relation Name Age of Onset    Breast cancer Mother      Diabetes Other      Drug abuse Other      Heart disease Other         No history of food allergy or atopic disease in the family.    Surgical History  She has a past surgical history that includes Other surgical history (01/11/2022) and Other surgical history (01/11/2022).    Social/Environmental History  She reports that she has never smoked. She has never used smokeless tobacco. She reports current alcohol use. She reports that she does not use drugs.      MEDICATIONS  Current Outpatient Medications on File Prior to Visit   Medication Sig Dispense Refill    cholecalciferol (Vitamin D-3) 25 MCG (1000 UT) capsule Take 1 capsule (25 mcg) by mouth once daily.      levothyroxine (Synthroid, Levoxyl) 50 mcg tablet Take 1 tablet (50 mcg) by mouth once daily. 30 tablet 2     methen-m.blue-s.phos-phsal-hyo (UribeL) 118-10-40.8-36 mg capsule Take 1 capsule by mouth 3 times a day as needed (PRN bladder spasm). 30 capsule 2    prenat.vits,shellie,min-iron-folic tablet Take 1 tablet by mouth once daily.      sertraline (Zoloft) 50 mg tablet Take 1 tablet (50 mg) by mouth once daily. 90 tablet 0    [DISCONTINUED] hydrOXYzine HCL (Atarax) 25 mg tablet Take 1 tablet (25 mg) by mouth 2 times a day. (Patient taking differently: Take 1 tablet (25 mg) by mouth 2 times a day as needed.) 180 tablet 3    pantoprazole (ProtoNix) 20 mg EC tablet Take 2 tablets (40 mg) by mouth 2 times a day for 14 days. Do not crush, chew, or split. 56 tablet 0    progesterone (Prometrium) 200 mg capsule Take 1 capsule (200 mg) by mouth. TAKE 1 CAPSULE BY MOUTH EVERY DAY FOR THE FIRST 12 DAYS OF EACH MONTH      Soolantra 1 % cream 1 Application once daily. APPLY TOPICALLY TO FACE 1 TIME IN THE MORNING      [DISCONTINUED] estradiol (Estrace) 0.01 % (0.1 mg/gram) vaginal cream Apply daily 1 gram apply for vagina for 3 weeks, then 3 times per week. (Patient not taking: Reported on 6/10/2024) 42.5 g 5    [DISCONTINUED] fluticasone (Flonase) 50 mcg/actuation nasal spray Administer 1 spray into each nostril once daily. Shake gently. Before first use, prime pump. After use, clean tip and replace cap. (Patient not taking: Reported on 6/10/2024) 16 g 5    [DISCONTINUED] lidocaine-prilocaine (Emla) 2.5-2.5 % cream APPLY TO TREATMENT AREA 1 HOUR PRIOR TO INJECTION. (Patient not taking: Reported on 6/10/2024) 60 g 2    [DISCONTINUED] LORazepam (Ativan) 0.5 mg tablet Take 1 tablet (0.5 mg) by mouth if needed for anxiety (flight anxiety; take prior to flight) for up to 6 doses. (Patient not taking: Reported on 6/10/2024) 6 tablet 0    [DISCONTINUED] peg-sod sul-NaCl-KCl-asb-C (MoviPrep) 100-7.5-2.691 gram solution Schedule the initial dose the evening before colonoscopy, followed by a second dose the morning of procedure (2 day regimen)  "or ~ 90 minutes after starting dose (1 day regimen). (Patient not taking: Reported on 6/10/2024) 1 each 0    [DISCONTINUED] progesterone 50 mg/mL injection DRAW UP 2 ML (100 MG) AND INJECT INTO THE MUSCLE AT THE SAME TIME EACH MORNING AS DIRECTED PER PROVIDER. (Patient not taking: Reported on 6/10/2024) 60 mL 2     No current facility-administered medications on file prior to visit.         Physical Exam  Visit Vitals  /74   Pulse 74   Temp 37 °C (98.6 °F) (Temporal)   Resp 17   Ht 1.626 m (5' 4\")   Wt 74.8 kg (165 lb)   LMP  (LMP Unknown)   SpO2 97%   BMI 28.32 kg/m²   OB Status Having periods   Smoking Status Never   BSA 1.84 m²       Wt Readings from Last 1 Encounters:   06/10/24 74.8 kg (165 lb)       Physical Exam    General: Well appearing, no acute distress  Head: Normocephalic, atraumatic, neck supple without lymphadenopathy  Eyes: EOMI, non-injected  Nose: No nasal crease, nares patent, slightly boggy turbinates, minimal discharge      Extremities: Moves all extremities symmetrically, no edema  Skin: No rashes/lesions  Psych: normal mood and affect    LAB RESULTS:  CBC:  Recent Labs     05/04/24 1701 04/22/24 0839 01/11/23  1140   WBC 18.9* 7.1  7.1 5.8   HGB 15.5 13.9  13.9 14.7   HCT 46.1* 41.9  41.9 43.0    348  348 303   MCV 91 93  93 93   EOSABS 0.09 0.14 0.09       CMP:  Recent Labs     05/04/24 1701 04/22/24  0839 01/11/23  1140    139 140   K 3.5 4.4 3.7    105 105   CO2 23 26 27   ANIONGAP 15 12 12   BUN 9 8 8   CREATININE 0.69 0.76 0.66   EGFR >90 >90  --    MG  --   --  2.30     Recent Labs     05/04/24 1701 04/22/24  0839 01/11/23  1140   ALBUMIN 4.7 4.5 4.4   ALKPHOS 31* 33 44   ALT 17 20 24   AST 15 15 25   BILITOT 0.4 0.5 0.5   LIPASE 13  --   --      Recent Labs     05/04/24  1701 04/22/24  0839 01/11/23  1140   EOSABS 0.09 0.14 0.09         IMMUNO:   Recent Labs     04/22/24  0839                  COAG:   Recent Labs     05/04/24  1802 "   INR 1.2*       ABO:   Recent Labs     05/04/24  1802   ABO A       HEME/ENDO:  Recent Labs     04/22/24  0839 12/20/23  0913 04/24/23  1714 04/18/23  1547 07/21/22  1727 04/29/22  1040   TSH  --  1.54 1.05 CANCELED   < >  --    HGBA1C 5.2  --   --   --   --  5.4    < > = values in this interval not displayed.     Component  Ref Range & Units 1 mo ago   Ochoa Binding Lectin  >=76 ng/mL 51 Low    Comment: INTERPRETIVE INFORMATION: Mannose Binding Lectin   Strep Pneumo IgG Ab 23 Serotypes  Order: [id number]   Status: Final result       Visible to patient: Yes (seen)       Dx: Chronic pansinusitis    0 Result Notes      Component  Ref Range & Units 1 mo ago   Serotype 1  ug/mL 0.42   Serotype 2  ug/mL 0.44   Serotype 3  ug/mL 0.64   Serotype 4  ug/mL 0.98   Serotype 5  ug/mL 0.63   Serotype 8  ug/mL 0.22   Serotype 9N  ug/mL 0.29   Serotype 12F  ug/mL 0.13   Serotype 14  ug/mL 0.21   Serotype 17F  ug/mL 0.46   Serotype 19F  ug/mL 2.78   Serotype 20  ug/mL 1.97   Serotype 22F  ug/mL 0.07   Serotype 23F  ug/mL 0.21   Serotype 6B(26)  ug/mL 0.28   Serotype 10A(34)  ug/mL 1.49   Serotype 11A(43)  ug/mL 0.93   Serotype 7F(51)  ug/mL 0.32   Serotype 15B(54)  ug/mL 0.33   Serotype 18C(56)  ug/mL 0.69   Serotype 19A(57)  ug/mL 1.19   Serotype 9V(68)  ug/mL 0.80   Serotype 33F(70)  ug/mL 5.91   Pneumo Serotype Interpretation See Note          Assessment/Plan   Jocelyn is a 43 yo woman with a history of some chronic sinus issues, but main issue for her immune work up was due to recurrent pregnancy loss. She is showing low antibody titers for S. Pneumonia and decreased MBL level. We discussed her immune labs in detail. I recommend Pneumovax today with repeat labs in 4 wks. I will call with results.    Valeri Newton DO

## 2024-06-17 ENCOUNTER — APPOINTMENT (OUTPATIENT)
Dept: ENDOCRINOLOGY | Facility: CLINIC | Age: 44
End: 2024-06-17
Payer: COMMERCIAL

## 2024-06-17 ENCOUNTER — ANCILLARY PROCEDURE (OUTPATIENT)
Dept: ENDOCRINOLOGY | Facility: CLINIC | Age: 44
End: 2024-06-17

## 2024-06-17 ENCOUNTER — HOSPITAL ENCOUNTER (OUTPATIENT)
Dept: RADIOLOGY | Facility: HOSPITAL | Age: 44
Discharge: HOME | End: 2024-06-17
Payer: COMMERCIAL

## 2024-06-17 DIAGNOSIS — Z31.41 FERTILITY TESTING: Primary | ICD-10-CM

## 2024-06-17 DIAGNOSIS — Z31.41 FERTILITY TESTING: ICD-10-CM

## 2024-06-17 DIAGNOSIS — Z01.812 ENCOUNTER FOR PREPROCEDURAL LABORATORY EXAMINATION: ICD-10-CM

## 2024-06-17 DIAGNOSIS — Z01.812 ENCOUNTER FOR PREPROCEDURAL LABORATORY EXAMINATION: Primary | ICD-10-CM

## 2024-06-17 LAB — PREGNANCY TEST URINE, POC: NEGATIVE

## 2024-06-17 PROCEDURE — 2550000001 HC RX 255 CONTRASTS: Performed by: OBSTETRICS & GYNECOLOGY

## 2024-06-17 PROCEDURE — 74740 X-RAY FEMALE GENITAL TRACT: CPT | Performed by: OBSTETRICS & GYNECOLOGY

## 2024-06-17 PROCEDURE — 81025 URINE PREGNANCY TEST: CPT | Performed by: NURSE PRACTITIONER

## 2024-06-17 PROCEDURE — 58340 CATHETER FOR HYSTEROGRAPHY: CPT | Performed by: NURSE PRACTITIONER

## 2024-06-17 PROCEDURE — 74740 X-RAY FEMALE GENITAL TRACT: CPT

## 2024-06-17 NOTE — PROGRESS NOTES
Patient ID: Jocelyn Dee is a 44 y.o. female.    HSG    Date/Time: 6/17/2024 7:47 AM    Performed by: SIL Foster  Authorized by: SIL Foster    Consent:     Consent obtained:  Verbal and written    Consent given by:  Patient    Risks, benefits, and alternatives were discussed: yes      Risks discussed:  Bleeding, infection and pain  Universal protocol:     Procedure explained and questions answered to patient or proxy's satisfaction: yes      Relevant documents present and verified: yes      Test results available: yes      Imaging studies available: yes      Required blood products, implants, devices, and special equipment available: yes      Immediately prior to procedure, a time out was called: yes      Patient identity confirmed:  Verbally with patient, arm band and hospital-assigned identification number  Indications:     Indications:  Fertility testing  Pre-procedure details:     Skin preparation:  Povidone-iodine  Sedation:     Sedation type:  None  Anesthesia:     Anesthesia method:  None  Procedure specific details:      Assistant: done by this KEVIN      Hysterosalpingogram (HSG) risks, benefits, alternatives, and personnel discussed with patient who agreed to proceed.    Procedural time out        Done in room where procedure done: Yes        Done just before starting procedure: Yes                                                 All members of procedural team involved in time-out: Yes                  Active communication used: Yes                                                         All team members agreed on procedure: Yes                                        Patient correctly identified by two identifiers: Yes                                  Correct side and site identified: Yes                                                     All needed special equipment/instruments available: Yes               Prior to the start of the procedure a time out was taken and the  following were verified: The identity of the patient using two patient identifiers.   Urine pregnancy test was performed and was negative.   Risks, benefits, and alternatives of the procedure were explained to the patient.  Informed consent was obtained.     The patient was placed in the dorsal lithotomy position and a sterile speculum was placed in the vagina. The cervix was sterilized with Betadine x 3. The anterior lip of the cervix was grasped with a single-tooth tenaculum. The (balloon/acorn) cannula was then placed in the cervix and secured to the tenaculum. The patient was positioned and images were taken with fluoroscopy as dye was inserted through the cannula. All instruments were then removed. The patient tolerated the procedure well and was discharged home the same day without complications.    PRELIMINARY NURSE PRACTITIONER ASSESSMENT - FOR A FINAL REPORT, PLEASE REFER TO THE FINALIZED DOCUMENTATION IN THE IMAGING TAB   Uterus:  normal contour without filling defects  Tubes:  bilateral patency with free spill of dye, normal tubal architecture noted, and no loculations present.  Based on these findings, my recommendation is: No further follow up required.    The patient was counseled regarding the above preliminary findings and understands these will be reviewed by the reading physician.   Joellen Onofre 06/17/24 7:47 AM        Post-procedure details:     Procedure completion:  Tolerated well, no immediate complications

## 2024-06-20 ENCOUNTER — TELEPHONE (OUTPATIENT)
Dept: ENDOCRINOLOGY | Facility: CLINIC | Age: 44
End: 2024-06-20
Payer: COMMERCIAL

## 2024-06-20 NOTE — TELEPHONE ENCOUNTER
----- Message from Jaymie Chong MD sent at 6/20/2024  3:40 PM EDT -----  Regarding: RE: touching base  I can put her on my wait list.  I would recommend she do the MRI and touch base with me after to review recommendations.  I can call her after MRI if she can't get in.    Ellie please add to wait list for 30 min virtual FUV, thanks  ----- Message -----  From: Catie Melchor RN  Sent: 6/20/2024   3:26 PM EDT  To: Jaymie Chong MD  Subject: touching base                                    Hi Dr. Chong  Jocelyn called today. She got your message on [email or web address], she called to get put on your wait list, she cannot get in until maybe August.    She is undecided about surgery, she was thinking about it and they mentioned doing an MRI before hand so she may get this done either way just to see what they find out, that is next week.    What are your suggestions? Would you recommend surgery? Would you recommend doing the MRI even if she doesn't do surgery or would it not give much information from your knowledge?    She has been off Aygestin for a month now since she went to the ER with gastritis from it (she was taking it before bed with barely any water), stopped after she stopped the aygestin.    Do you think it would be smart for her to do the surgery or recommend just going into another transfer with the HSG being normal?    She seems a little torn on how to move forward.    Thanks!

## 2024-06-20 NOTE — TELEPHONE ENCOUNTER
Patient called to touch base. On wait list, Dr. Chong booking out into August. Would like to hear her thoughts on next steps. Patient scheduled for MRI 6/27 but unsure if she should do it, considering laparoscopy but does not know if she should do it. May do the MRI even if she does not do the surgery for her own personal knowledge. Patient has been off aygestin for about a month now since she went to the ER with gastritis, states she was taking it right before bed with barely any water, stopped as soon as she stopped the medicine.  Will send message to Dr Chong to see her thoughts on next steps.    06/20/24 at 3:28 PM - Catie Melchor RN

## 2024-06-27 ENCOUNTER — HOSPITAL ENCOUNTER (OUTPATIENT)
Dept: RADIOLOGY | Facility: CLINIC | Age: 44
Discharge: HOME | End: 2024-06-27
Payer: COMMERCIAL

## 2024-06-27 DIAGNOSIS — N94.6 DYSMENORRHEA: ICD-10-CM

## 2024-06-27 PROCEDURE — 72197 MRI PELVIS W/O & W/DYE: CPT

## 2024-06-27 PROCEDURE — 2550000001 HC RX 255 CONTRASTS: Performed by: STUDENT IN AN ORGANIZED HEALTH CARE EDUCATION/TRAINING PROGRAM

## 2024-06-27 PROCEDURE — A9575 INJ GADOTERATE MEGLUMI 0.1ML: HCPCS | Performed by: STUDENT IN AN ORGANIZED HEALTH CARE EDUCATION/TRAINING PROGRAM

## 2024-06-27 RX ORDER — GADOTERATE MEGLUMINE 376.9 MG/ML
0.2 INJECTION INTRAVENOUS
Status: COMPLETED | OUTPATIENT
Start: 2024-06-27 | End: 2024-06-27

## 2024-07-10 ENCOUNTER — LAB (OUTPATIENT)
Dept: LAB | Facility: LAB | Age: 44
End: 2024-07-10
Payer: COMMERCIAL

## 2024-07-10 DIAGNOSIS — K58.2 IRRITABLE BOWEL SYNDROME WITH BOTH CONSTIPATION AND DIARRHEA: ICD-10-CM

## 2024-07-10 DIAGNOSIS — B99.9 CHRONIC INFECTION: ICD-10-CM

## 2024-07-10 PROCEDURE — 83516 IMMUNOASSAY NONANTIBODY: CPT

## 2024-07-10 PROCEDURE — 83520 IMMUNOASSAY QUANT NOS NONAB: CPT

## 2024-07-10 PROCEDURE — 86665 EPSTEIN-BARR CAPSID VCA: CPT

## 2024-07-10 PROCEDURE — 36415 COLL VENOUS BLD VENIPUNCTURE: CPT

## 2024-07-10 PROCEDURE — 86317 IMMUNOASSAY INFECTIOUS AGENT: CPT

## 2024-07-10 PROCEDURE — 86663 EPSTEIN-BARR ANTIBODY: CPT

## 2024-07-10 PROCEDURE — 86664 EPSTEIN-BARR NUCLEAR ANTIGEN: CPT

## 2024-07-11 LAB
EBV EA IGG SER QL: NEGATIVE
EBV NA AB SER QL: POSITIVE
EBV VCA IGG SER IA-ACNC: POSITIVE
EBV VCA IGM SER IA-ACNC: NEGATIVE
GLIADIN PEPTIDE IGA SER IA-ACNC: 8.2 U/ML
TTG IGA SER IA-ACNC: <1 U/ML

## 2024-07-12 LAB
GLIADIN PEPTIDE IGG SER IA-ACNC: <0.56 FLU (ref 0–4.99)
TTG IGG SER IA-ACNC: <0.82 FLU (ref 0–4.99)

## 2024-07-13 LAB
S PN DA SERO 19F IGG SER-MCNC: 10.95 UG/ML
S PNEUM DA 1 IGG SER-MCNC: 2.19 UG/ML
S PNEUM DA 10A IGG SER-MCNC: 3.57 UG/ML
S PNEUM DA 11A IGG SER-MCNC: >3.45 UG/ML
S PNEUM DA 12F IGG SER-MCNC: 1.5 UG/ML
S PNEUM DA 14 IGG SER-MCNC: 1.92 UG/ML
S PNEUM DA 15B IGG SER-MCNC: 7.21 UG/ML
S PNEUM DA 17F IGG SER-MCNC: 10.56 UG/ML
S PNEUM DA 18C IGG SER-MCNC: >11.15 UG/ML
S PNEUM DA 19A IGG SER-MCNC: 1.3 UG/ML
S PNEUM DA 2 IGG SER-MCNC: 13.74 UG/ML
S PNEUM DA 20A IGG SER-MCNC: 13.35 UG/ML
S PNEUM DA 22F IGG SER-MCNC: 2.12 UG/ML
S PNEUM DA 23F IGG SER-MCNC: 5.2 UG/ML
S PNEUM DA 3 IGG SER-MCNC: 4.35 UG/ML
S PNEUM DA 33F IGG SER-MCNC: >13.39 UG/ML
S PNEUM DA 4 IGG SER-MCNC: 8.17 UG/ML
S PNEUM DA 5 IGG SER-MCNC: 5.11 UG/ML
S PNEUM DA 6B IGG SER-MCNC: 0.41 UG/ML
S PNEUM DA 7F IGG SER-MCNC: 18.57 UG/ML
S PNEUM DA 8 IGG SER-MCNC: 7.77 UG/ML
S PNEUM DA 9N IGG SER-MCNC: 8.1 UG/ML
S PNEUM DA 9V IGG SER-MCNC: >11.73 UG/ML
S PNEUM SEROTYPE IGG SER-IMP: NORMAL

## 2024-07-16 LAB — MANNOSE-BP SER-MCNC: 55 NG/ML

## 2024-08-02 ENCOUNTER — TELEPHONE (OUTPATIENT)
Dept: ENDOCRINOLOGY | Facility: CLINIC | Age: 44
End: 2024-08-02
Payer: COMMERCIAL

## 2024-08-02 NOTE — TELEPHONE ENCOUNTER
Caller: Jocelyn  Reason for call: questions regarding next steps  Notes: she is calling about when she can start her Rx.

## 2024-08-02 NOTE — TELEPHONE ENCOUNTER
Returned patient's call. Patient recently had an MRI done with Dr. Gomez's office, for possible endometriosis. Patient has a FUV scheduled with Dr. Reyna on 8/15 to discuss options if she would like to proceed with a laparoscopic surgery or not. If she does not want to proceed with surgery, Dr. Chong would recommend 2 month suppression with lupron before FET cycle and consider adding in prednisone. Patient does have a follow up visit with Dr. Chong on 9/3, but was hoping to proceed with a FET this fall, and is wondering if she needs to wait until after this appointment to start any medications, or if she can proceed beforehand. Patient does not want to start any meds until after she meets with Dr. Reyna, but wants to know her options.     Message sent to Dr. Chong and primary RN to confirm, and to work on boarding pass. Will try and add patient on to waitlist if she needs to be seen prior to starting medications.    Jennifer Christian 08/02/24 2:12 PM

## 2024-08-05 ENCOUNTER — DOCUMENTATION (OUTPATIENT)
Dept: ENDOCRINOLOGY | Facility: CLINIC | Age: 44
End: 2024-08-05
Payer: COMMERCIAL

## 2024-08-05 NOTE — PROGRESS NOTES
MD Jennifer Swartz, NEGAR; Rosie Rivero, RN  Hi if she wants to do Lupron rather than surgery she can start that prior to my visit with her.  We can add to her waitlist to see if we can get her in sooner- Kwame/ is correct contact          Previous Messages       ----- Message -----  From: Jennifer Christian RN  Sent: 8/2/2024   2:10 PM EDT  To: Jaymie Chong MD; Rosie Rivero, RN  Subject: Suppression                                      Hi Dr. Chong,  This patient had an MRI done with Dr. Saul office and is going to be following up with her on August 15th to discuss possible laparoscopy for endometriosis. She wants to wait for this appointment before she starts any medications, but it looks like you recommended a 2 month suppression for her with Lupron if she decided against surgery. She has a FUV with you in September, but she is wondering if she needs to wait to start suppression before then, she is hoping for a transfer this fall.  She also would like to see if she can be put on your wait list. Which I can message kwame and the  separately to be seen earlier if possible.

## 2024-08-09 ENCOUNTER — TELEPHONE (OUTPATIENT)
Dept: ENDOCRINOLOGY | Facility: CLINIC | Age: 44
End: 2024-08-09
Payer: COMMERCIAL

## 2024-08-09 NOTE — TELEPHONE ENCOUNTER
TC to pt to inform her of Dr. Yadav response below; LM to call the office back. Will send message to Kwame BULLOCK to place her on wait list for earlier appt with Dr yadav.   Rosie Rivero 08/09/24 1:48 PM      ----- Message from Jaymie Yadav sent at 8/2/2024  3:47 PM EDT -----  Regarding: RE: Suppression  Hi if she wants to do Lupron rather than surgery she can start that prior to my visit with her.  We can add to her waitlist to see if we can get her in sooner- Kwame/ is correct contact  ----- Message -----  From: Jennifer Christian RN  Sent: 8/2/2024   2:10 PM EDT  To: Jaymie Yadav MD; Rosie Rivero RN  Subject: Suppression                                      Hi Dr. Yadav,   This patient had an MRI done with Dr. Saul office and is going to be following up with her on August 15th to discuss possible laparoscopy for endometriosis. She wants to wait for this appointment before she starts any medications, but it looks like you recommended a 2 month suppression for her with Lupron if she decided against surgery. She has a FUV with you in September, but she is wondering if she needs to wait to start suppression before then, she is hoping for a transfer this fall.   She also would like to see if she can be put on your wait list. Which I can message kwame and the  separately to be seen earlier if possible.   184.9

## 2024-08-15 ENCOUNTER — OFFICE VISIT (OUTPATIENT)
Dept: OBSTETRICS AND GYNECOLOGY | Facility: CLINIC | Age: 44
End: 2024-08-15
Payer: COMMERCIAL

## 2024-08-15 VITALS — WEIGHT: 165 LBS | BODY MASS INDEX: 28.17 KG/M2 | HEIGHT: 64 IN

## 2024-08-15 DIAGNOSIS — N80.9 ENDOMETRIOSIS: Primary | ICD-10-CM

## 2024-08-15 DIAGNOSIS — N97.9 FEMALE INFERTILITY: ICD-10-CM

## 2024-08-15 PROCEDURE — 1036F TOBACCO NON-USER: CPT | Performed by: STUDENT IN AN ORGANIZED HEALTH CARE EDUCATION/TRAINING PROGRAM

## 2024-08-15 PROCEDURE — 99213 OFFICE O/P EST LOW 20 MIN: CPT | Performed by: STUDENT IN AN ORGANIZED HEALTH CARE EDUCATION/TRAINING PROGRAM

## 2024-08-15 PROCEDURE — 3008F BODY MASS INDEX DOCD: CPT | Performed by: STUDENT IN AN ORGANIZED HEALTH CARE EDUCATION/TRAINING PROGRAM

## 2024-08-15 ASSESSMENT — PAIN SCALES - GENERAL: PAINLEVEL: 0-NO PAIN

## 2024-08-15 NOTE — PROGRESS NOTES
Division of Minimally Invasive Gynecologic Surgery  [facility]    08/15/24 Gynecology Visit - Virtual Telephone Visit     CC:   Chief Complaint   Patient presents with    Follow-up     MRI results, and  medication       Jocelyn Dee is a 44 y.o. presents for virtual visit to review MRI findings and discuss next steps.    Recent MRI pelvis showed 2cm subserosal fibroid and thickening for suspicious for endometriosis on left round ligament, otherwise unremarkable.      Dr. Chong discussed with Laura the option of suppression with Lupron for 2 months. She is also following w/ Rheumatology for suspected SLE vs other autoimmune disorder.     Screening:   - Last pap 4/2024 negative/negative  - Last mammogram 12/2023 BIRADS 2   PMHx: umbilical hernia, health anxiety/depression   PSHx: LEEP, CKC, tonsillectomy     PMHx, PSHx, SHx, Allergies, and Medications updated in Epic.    ROS: reviewed and negative    PE: Virtual Telephone Visit     A/P: Jocelyn Dee is a 44 y.o. presents for virtual visit to review MRI findings and discuss next steps.  - Discussed that while fibroids can cause pain/bleeding/fertility issues for some patients, it is unlikely to cause these issues for her given location and small size. Also discussed that fibroids can continue to grow in size until menopause, after which they typically shrink.   - Reviewed MRI findings w/ some suspicion for endometriosis, but no evidence of deep infiltrating disease. While we cannot rule out endometriosis based on MRI, I do think it is less likely to be the primary fertility issue for her at this time.   - She and I discussed options, including excisional surgery. Since she is not currently struggling w/ significant pain, fertility treatments would like need to be paused in the perioperative period, and excisional surgery would not guarantee successful pregnancy, she would prefer to defer surgery at this time. I do think  this is reasonable.   - She will plan to follow up w/ her Rheumatologist and Dr. Chong. I will reach out to Dr. Chong today to review our discussion.   - RTC PRNORMA Reyna MD  Division of Minimally Invasive Gynecologic Surgery  [facility]

## 2024-08-20 DIAGNOSIS — E03.8 SUBCLINICAL HYPOTHYROIDISM: ICD-10-CM

## 2024-08-20 RX ORDER — LEVOTHYROXINE SODIUM 50 UG/1
50 TABLET ORAL DAILY
Qty: 30 TABLET | Refills: 2 | Status: SHIPPED | OUTPATIENT
Start: 2024-08-20 | End: 2024-11-18

## 2024-08-27 ENCOUNTER — TELEPHONE (OUTPATIENT)
Dept: ENDOCRINOLOGY | Facility: CLINIC | Age: 44
End: 2024-08-27

## 2024-08-27 ENCOUNTER — APPOINTMENT (OUTPATIENT)
Dept: ALLERGY | Facility: CLINIC | Age: 44
End: 2024-08-27
Payer: COMMERCIAL

## 2024-08-27 ENCOUNTER — DOCUMENTATION (OUTPATIENT)
Dept: ENDOCRINOLOGY | Facility: CLINIC | Age: 44
End: 2024-08-27

## 2024-08-27 NOTE — PROGRESS NOTES
TC to pt - LM for pt to call the office to discuss endometrial suppression protocol.  Rosie Rivero 08/27/24 2:23 PM        MD Rosie Swartz RN; Catie Melchor, NEGAR; Danielle Young, RN; Jennifer Christian, NEGAR  yes          Previous Messages       ----- Message -----  From: Rosie Rivero RN  Sent: 8/26/2024   7:10 AM EDT  To: Jaymie Chong MD; Catie Melchor, RN; *  Subject: RE: Plan for suppression                        Thank you  Just to confirm, we would start the Lupron, letrozole and Provera at the same time, as if mid luteal start?  Rosie  ----- Message -----  From: Jaymie Chong MD  Sent: 8/23/2024   3:09 PM EDT  To: Rosie Rivero RN; Catie Melchor, RN; *  Subject: RE: Plan for suppression                        Hi we can do Lupron and Letrozole (no Aygestin) given her side effects with Aygestin.  Can check labs (p4 and hcg) and start if negative since no menses- would recommend Provera overlap for first week  ----- Message -----  From: Catie Melchor RN  Sent: 8/20/2024  10:59 AM EDT  To: Jaymie Cohng MD; Rosie Rivero, RN; *  Subject: Plan for suppression                            Hi Jocelyn Conrad had a follow up appointment with Dr. Reyna concerning her MRI results.  They have agreed that surgery is not necessary at this point in time. She wants to proceed with endo suppression for another transfer. She mentioned you thought you said full endometriosis suppression and another time mentioned just lupron. Can you please confirm what you would like for her for suppression? She was taking aygestin for a while and ended up in the ED with gastritis so we had her stop it, she said she was only taking it with a small sip of water right before bed so she wasn't sure if that is what caused it. Please confirm protocol, she does not get periods on her own she said, so not sure if you would want provera lead in to induce a period and then start suppression  meds?    Please reply all- I am out of the office the week of the 26th!

## 2024-08-28 ENCOUNTER — TELEMEDICINE (OUTPATIENT)
Dept: PRIMARY CARE | Facility: CLINIC | Age: 44
End: 2024-08-28
Payer: COMMERCIAL

## 2024-08-28 DIAGNOSIS — U07.1 COVID: Primary | ICD-10-CM

## 2024-08-28 PROCEDURE — 99213 OFFICE O/P EST LOW 20 MIN: CPT | Performed by: NURSE PRACTITIONER

## 2024-08-28 NOTE — PROGRESS NOTES
Subjective   Patient ID: Jocelyn Dee is a 44 y.o. female who presents for covid (Tested positive yesterday. Symptoms started on Monday. Current symptoms include cough, sore throat, headache and body aches. //Son had strep throat a few weeks ago concerned she may have covid and strep. ).    Tested positive for COVID.  Started with scratchy throat Sunday and Monday.  Took a COVID test Monday. Complains of sore throat on one side, scratchy throat, headache, runny nose, muscle aches.     Step daughter has it as well.   Her step son had strep a few weeks.       Review of Systems  otherwise negative aside from what was mentioned above in HPI.    Objective   There were no vitals taken for this visit.    Physical Exam  This visit was completed via telephone/virtual visit. All issues as documented below were discussed and addressed but no physical exam was performed. If it was felt that the patient should be evaluated via  face-to-face office appointment(s) they were directed there. The patient verbally consented to this visit.    Assessment/Plan   Problem List Items Addressed This Visit    None  Visit Diagnoses         Codes    COVID    -  Primary U07.1        Plan:  -Drink plenty of fluids and get plenty of rest. If you develop SOB, chest pain, dizziness, or severe headache call for follow up or go directly to ER.     -Monitor your health and practice social distancing. Social distancing been staying out of crowded places, avoiding group gatherings, and maintaining distance (approximately 6 feet) from others when possible until symptoms are getting better and you have not had a fever for more than 24 hours without fever reducing medications.     -If you get sick with fever (100.4°F/38°C or higher), cough, or have trouble breathing; call our office back or call the emergency room. Discussed the importance of calling ahead to prevent the spread of infection to others.    -Avoid contact with others.    -Do not travel  while sick.    -Wash her hands often with soap and water for at least 20 seconds. If soap and water are not readily available, and use an alcohol-based hand  that contains at least 60% alcohol. Soap and water should be used if hands are visibly dirty.

## 2024-08-30 ENCOUNTER — DOCUMENTATION (OUTPATIENT)
Dept: ENDOCRINOLOGY | Facility: CLINIC | Age: 44
End: 2024-08-30
Payer: COMMERCIAL

## 2024-08-30 DIAGNOSIS — N97.9 FEMALE INFERTILITY: ICD-10-CM

## 2024-08-30 RX ORDER — LETROZOLE 2.5 MG/1
5 TABLET, FILM COATED ORAL DAILY
Qty: 60 TABLET | Refills: 2 | Status: SHIPPED | OUTPATIENT
Start: 2024-08-30 | End: 2024-11-28

## 2024-08-30 RX ORDER — LEUPROLIDE ACETATE 3.75 MG
3.75 KIT INTRAMUSCULAR
Qty: 1 KIT | Refills: 2 | Status: SHIPPED | OUTPATIENT
Start: 2024-08-30 | End: 2024-08-30 | Stop reason: SDUPTHER

## 2024-08-30 RX ORDER — LEUPROLIDE ACETATE 3.75 MG
3.75 KIT INTRAMUSCULAR
Qty: 1 KIT | Refills: 2 | Status: SHIPPED | OUTPATIENT
Start: 2024-08-30 | End: 2025-08-30

## 2024-08-30 RX ORDER — MEDROXYPROGESTERONE ACETATE 10 MG/1
10 TABLET ORAL DAILY
Qty: 7 TABLET | Refills: 0 | Status: SHIPPED | OUTPATIENT
Start: 2024-08-30 | End: 2025-08-30

## 2024-08-30 NOTE — TELEPHONE ENCOUNTER
Returned patient's call. Patient was returning a missed call from this week regarding her endo suppression. Patient does not normally get her period. Patient aware plan is for her to check bhcg and p4 to see if she has ovulated. If negative, can start Lupron and letrozole (and overlap provera x7 days for the first week) for her endo suppression. Patient will go on 9/3 for labs and will send message to place in noon huddle for this day. Patient also has a FUV with Dr. Chong this day to review any other necessary steps before transfer. Patient aware she will need updated labs, hysteroscopy and will schedule a colonoscopy. Patient will sign updated consent forms. Message sent to lab to confirm where embryos are currently.     No further questions at this time.     Jennifer Christian 08/30/24 10:24 AM

## 2024-08-30 NOTE — PROGRESS NOTES
Re-routed all fertility medications and supplies to [facility] pharmacy per insurance mandate. Patient can still fill with [facility] for self-pay if needed. Nursing team notified also.    Medications sent:   Lupron Depot 3.75mg    Khadijah Kam, PharmD  Hi!    Lupron Depot 3.75mg PA approved through 3/2/25.    Please reroute to [facility].    Thanks,  Khadijah Kam (Katie), Cindy  [facility]  Clinical Pharmacy Specialist- Fertility   [facility], [place]  [address] [place] [address]  Email: [email or web address]  Tel: [phone]       Fax: [phone]

## 2024-08-30 NOTE — PROGRESS NOTES
Juan Luis Peterson, [place]  Jennifer Christian RN; [facility]  Hi Jocelyn Rodriguez has 7 embryos with us here on site. Thanks!          Previous Messages       ----- Message -----  From: Jennifer Christian RN  Sent: 8/30/2024  10:21 AM EDT  To: [facility]  Subject: Embryos                                          Hi! Patient is calling to start suppression for 2 months before FET, and just wanted to confirm if her embryos are here on site.

## 2024-09-03 ENCOUNTER — LAB (OUTPATIENT)
Dept: LAB | Facility: LAB | Age: 44
End: 2024-09-03
Payer: COMMERCIAL

## 2024-09-03 ENCOUNTER — TELEMEDICINE (OUTPATIENT)
Dept: ENDOCRINOLOGY | Facility: CLINIC | Age: 44
End: 2024-09-03
Payer: COMMERCIAL

## 2024-09-03 ENCOUNTER — TELEPHONE (OUTPATIENT)
Dept: PRIMARY CARE | Facility: CLINIC | Age: 44
End: 2024-09-03

## 2024-09-03 DIAGNOSIS — E28.39 PREMATURE OVARIAN FAILURE: Primary | ICD-10-CM

## 2024-09-03 DIAGNOSIS — N96 RECURRENT PREGNANCY LOSS WITHOUT CURRENT PREGNANCY: ICD-10-CM

## 2024-09-03 DIAGNOSIS — F41.9 ANXIETY: ICD-10-CM

## 2024-09-03 DIAGNOSIS — E03.8 SUBCLINICAL HYPOTHYROIDISM: ICD-10-CM

## 2024-09-03 DIAGNOSIS — Z31.41 FERTILITY TESTING: ICD-10-CM

## 2024-09-03 DIAGNOSIS — Z11.3 SCREENING FOR STDS (SEXUALLY TRANSMITTED DISEASES): ICD-10-CM

## 2024-09-03 LAB
B-HCG SERPL-ACNC: <3 MIU/ML
HBV SURFACE AG SERPL QL IA: NONREACTIVE
HCV AB SER QL: NONREACTIVE
HIV 1+2 AB+HIV1 P24 AG SERPL QL IA: NONREACTIVE
PROGEST SERPL-MCNC: <0.3 NG/ML
TREPONEMA PALLIDUM IGG+IGM AB [PRESENCE] IN SERUM OR PLASMA BY IMMUNOASSAY: NONREACTIVE
TSH SERPL-ACNC: 0.91 MIU/L (ref 0.44–3.98)

## 2024-09-03 PROCEDURE — 84702 CHORIONIC GONADOTROPIN TEST: CPT

## 2024-09-03 PROCEDURE — 84144 ASSAY OF PROGESTERONE: CPT

## 2024-09-03 PROCEDURE — 87491 CHLMYD TRACH DNA AMP PROBE: CPT

## 2024-09-03 PROCEDURE — 99215 OFFICE O/P EST HI 40 MIN: CPT | Performed by: OBSTETRICS & GYNECOLOGY

## 2024-09-03 PROCEDURE — 36415 COLL VENOUS BLD VENIPUNCTURE: CPT

## 2024-09-03 PROCEDURE — 86780 TREPONEMA PALLIDUM: CPT

## 2024-09-03 PROCEDURE — 87340 HEPATITIS B SURFACE AG IA: CPT

## 2024-09-03 PROCEDURE — 86803 HEPATITIS C AB TEST: CPT

## 2024-09-03 PROCEDURE — 84443 ASSAY THYROID STIM HORMONE: CPT

## 2024-09-03 PROCEDURE — 87389 HIV-1 AG W/HIV-1&-2 AB AG IA: CPT

## 2024-09-03 PROCEDURE — 87591 N.GONORRHOEAE DNA AMP PROB: CPT

## 2024-09-03 RX ORDER — SERTRALINE HYDROCHLORIDE 50 MG/1
50 TABLET, FILM COATED ORAL DAILY
Qty: 90 TABLET | Refills: 3 | Status: SHIPPED | OUTPATIENT
Start: 2024-09-03 | End: 2025-08-29

## 2024-09-03 NOTE — TELEPHONE ENCOUNTER
Patient came to window and she stated that pharmacy declined patient request for medication Sertraline. Patient would like to change her pharmacy to: [facility] in [place] on [facility]. Please advise when medication was sent to pharmacy.   
Yes

## 2024-09-03 NOTE — PROGRESS NOTES
Visit Type: In Person    Follow Up Visit HPI    Patient is a 44 y.o. No obstetric history on file. female with Primary Ovarian Insufficiency presenting today for follow up visit- planning for donor egg FET    Interval history-  Had 2 negative FETs as below  Has seen Dr Reyna for endometriosis assessment--they decided not to proceed with laparoscopy   Now comes to discuss further management  Plan for endometriosis suppression prior to next FET    Also sees Rheumatologist- +LEELEE  Plans for Plaquenil to be started today    OB hx:  1. 2014-ETOP age 33  2. 2018-SAB, 1st trimester, missed ab. Conceived easily. Passed with cytotec. Age 37  -Both of the above with different partners    Testing to date:      Latest Reference Range & Units Most Recent   Hemoglobin A1C see below % 5.2  4/22/24 08:39   HCG, Beta-Quantitative <5 mIU/mL <2  1/15/24 09:25   Thyroxine, Free 0.61 - 1.12 ng/dL 0.72  7/21/22 17:27   Progesterone ng/mL 0.1  2/2/24 08:47   Thyroid Stimulating Hormone 0.44 - 3.98 mIU/L 1.54  12/20/23 09:13   Vitamin D, 25-Hydroxy, Total ng/mL 47  1/11/23 11:40   Estradiol pg/mL 966  2/2/24 08:47   GLUCOSE 74 - 99 mg/dL 97  5/4/24 17:01   Estimated Average Glucose Not Established mg/dL 103  4/22/24 08:39      Latest Reference Range & Units Most Recent   INR 0.9 - 1.1  1.2 (H)  5/4/24 18:02   DRVVT Screen RATIO 1.06  4/22/24 08:39   DRVVT Confirmation RATIO 0.89  4/22/24 08:39   Beta-2 Glyco 1 IgG <20.0 U/mL <1.4  4/22/24 08:39   Beta 2 Glyco 1 IgM <20.0 U/mL 1.8  4/22/24 08:39   Beta-2 Glyco 1 IgA <20.0 U/mL 0.6  4/22/24 08:39   Anticardiolipin IgG <20.0 GPL U/mL <1.6  4/22/24 08:39   Anticardiolipin IgM <20.0 MPL U/mL 1.2  4/22/24 08:39   Anticardiolipin IgA <20.0 APL U/mL 0.8  4/22/24 08:39   Protime 9.8 - 12.8 seconds 13.7 (H)  5/4/24 18:02   SCT Test Ratio <=1.16 RATIO 1.12  4/22/24 08:39   DRVVT Test Ratio <=1.20 RATIO 1.19  4/22/24 08:39   SCT Confirmation RATIO 0.84  4/22/24 08:39   SCT Screen RATIO  0.94  4/22/24 08:39   Lupus Anticoagulant Interpretation  No evidence of lupus anticoagulant in these assays (DRVVT and Silica Clotting Time (SCT)). Assay interferences may occur in the presence of factor deficiency/inhibitor and/or anticoagulants. For patients on anti-Vitamin K therapy, repeating DRVVT testing might be indicated when the patient is off anti-vitamin K therapy. The DRVVT assay contains a heparin neutralizer up to 1.0 U/mL. Higher concentrations of heparin may cause interferences. SCT results are not affected by UF heparin up to 0.5 U/mL and LMW Heparin up to 1.0 U/mL. Higher concentrations of heparin may cause interferences. Correlation with clinical findings and clinical history is necessary to assess significance of results in an individual patient.  4/22/24 08:39   (H): Data is abnormally high    Hysterosalpingogram: FL HYSTEROSALPINGOGRAM (6/17/2024):   IMPRESSION:  1. Correlate with real time fluoroscopic findings at the time of  procedure.  2. Normal study.    Saline Infused Sonography: SIS: Normal, EE 6 mm, ovaries small with 0 AFC noted   Pelvic MRI:      FINDINGS:  UTERUS:  The uterus is retroverted and measures 5.6 x 2.8 x 7.2 cm. There is a  subserosal fibroid in the anterior uterine wall near the fundus  measuring 1.8 x 1 cm. The thickness of the junctional zone is within  normal limits.   The endometrium has normal thickness and appearance.  There are small nabothian cysts. The vagina and vulva are otherwise  unremarkable. Note is made of T2 hypointense asymmetric thickening of  the left round ligament. No additional T2 hypointense plaques or  thickening of the pelvic ligaments is noted.      OVARIES/ADNEXA:      RIGHT:  The right ovary is normal in size and appears unremarkable.   There  is no cystic or solid mass or endometriosis. There is  no  hydrosalpinx.      LEFT:  The left ovary is normal in size and appears unremarkable.   There is  no cystic or solid mass or endometriosis.  There is  no hydrosalpinx.      PERITONEAL FLUID:  No  free or loculated fluid collections are evident in the pelvis.      PELVIC LYMPH NODES:  No abnormally enlarged pelvic lymph nodes are identified.      BOWEL:  Unremarkable.      BONES:  No focal lesions are noted in the bone.      IMPRESSION:  1.  T2 hypointense asymmetric thickening of the left round ligament.  Findings are nonspecific with deep infiltrating endometriosis not  excluded. However, no definite evidence of nodules or plaques in the  pelvis.  2. Note is made of a small subserosal fibroid in the anterior uterine  wall. 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  -Egg donor     Patient has had  Fragile X negative  Karyotype 46 XY  -No other genetic screening done       Treatment to date:   Fertility Cycles       Cycle Name Treatment Start Date Type Outcome    MOCK CYCLE , BCL6 ERA 2024  Active    FET #2 2023 Embryo Transfer, Thaw (Embryo) No Pregnancy          Agency donor- 23 eggs, 17 mature, 15 2pn  9 blasts frozen     FET #1- Programmed FET with oral estrace and IM P4 75 mg--> had spotting prior to FET, negative hCG     FET #2- Aygestin pre-treatment x 1 month, Estrace patches and IM P4 100 mg+ vaginal prometrium 200 mg BID--> negative hCG     7 donor egg blasts remaining         Past Medical History:   Diagnosis Date    Papillomavirus as the cause of diseases classified elsewhere     HPV in female    Personal history of other complications of pregnancy, childbirth and the puerperium     History of spontaneous     Personal history of other diseases of the female genital tract     History of infertility    Personal history of other infectious and parasitic diseases     History of  infection due to human papilloma virus (HPV)     Past Surgical History:   Procedure Laterality Date    OTHER SURGICAL HISTORY  01/11/2022    Tonsillectomy    OTHER SURGICAL HISTORY  01/11/2022    Cervical surgery     -Did have D&C at [facility] due to irregular bleeding with HRT, was chelsey     Current Outpatient Medications on File Prior to Visit   Medication Sig Dispense Refill    cholecalciferol (Vitamin D-3) 25 MCG (1000 UT) capsule Take 1 capsule (25 mcg) by mouth once daily.      letrozole (Femara) 2.5 mg tablet Take 2 tablets (5 mg total) by mouth once daily.  Take with or without food. 60 tablet 2    leuprolide, 1-month, (Lupron Depot) 3.75 mg injection Inject 3.75 mg into the muscle every 30 (thirty) days. 1 kit 2    levothyroxine (Synthroid, Levoxyl) 50 mcg tablet Take 1 tablet (50 mcg) by mouth once daily. 30 tablet 2    medroxyPROGESTERone (Provera) 10 mg tablet Take 1 tablet (10 mg) by mouth once daily. 7 tablet 0    prenat.vits,shellie,min-iron-folic tablet Take 1 tablet by mouth once daily.      Soolantra 1 % cream 1 Application once daily. APPLY TOPICALLY TO FACE 1 TIME IN THE MORNING      [DISCONTINUED] leuprolide, 1-month, (Lupron Depot) 3.75 mg injection Inject 3.75 mg into the muscle every 30 (thirty) days. 1 kit 2    [DISCONTINUED] sertraline (Zoloft) 50 mg tablet Take 1 tablet (50 mg) by mouth once daily. 90 tablet 0     No current facility-administered medications on file prior to visit.       BMI:   BMI Readings from Last 1 Encounters:   08/15/24 28.32 kg/m²     VITALS:  There were no vitals taken for this visit.  LMP: No LMP recorded.    ASSESSMENT   44 y.o. No obstetric history on file. female with infertility , Primary Ovarian Insufficiency and the following pertinent medical issues: Planning another donor egg FET; history of +LEELEE, +BCL6 failed donor egg FET x2,  .    COUNSELING  We reviewed testing and treatment to date  Discussed options for +BCL6 and +LEELEE  Patient will do endometriosis suppression  x 2 months (Lupron and Letrozole)  Patient planning to start Plaquenil with rheumatologist    PLAN  No orders of the defined types were placed in this encounter.      FOLLOW UP     MD Completion:  Ectopic Risk: No  Medically Complex: No  Outstanding boarding pass items: Awaiting updated STDs, needs updated hysteroscopy     Fertility Plan Update:  Lupron + Letrozole x 2 months prior to FET- follow up P4 level today, if negatives start Provera 10 mg daily  x 7 days and start Lupron and Letrozole same time  For next FET-  Programmed FETEstrace patches, IM P4 100 mg+ vaginal prometrium 200 mg BID (starting 1 day after IM P4)  Prednisone  10 mg to start with progesterone per protocol  Stay on Plaquenil per rheumatology recommendations    Jaymie Chong  09/03/2024  1:10 PM

## 2024-09-03 NOTE — PROGRESS NOTES
Patient getting labs today for endo suppression start with the plan to start Lupron depot, letrozole and provera (provera just for 7 days to overlap other medications). Will call patient with results and plan.   SATISH PRICE on 9/3/24 at 8:28 AM.      [facility] PROVID[facility]  Ultrasound and/or labs reviewed at [facility].   Results for orders placed or performed in visit on 09/03/24 (from the past 96 hour(s))   TSH with reflex to Free T4 if abnormal   Result Value Ref Range    Thyroid Stimulating Hormone 0.91 0.44 - 3.98 mIU/L   Progesterone   Result Value Ref Range    Progesterone <0.3 ng/mL   Hepatitis B surface antigen   Result Value Ref Range    Hepatitis B Surface AG Nonreactive Nonreactive   (Lab Collect) Human Chorionic Gonadotropin, Serum Quantitative   Result Value Ref Range    HCG, Beta-Quantitative <3 <5 mIU/mL       Will plan to start suppression tomorrow    Haylie Saleh  09/03/2024  3:39 PM      Called patient and left voicemail with plan. Patient will come in for lupron depot injection tomorrow as well as start letrozole and provera.   SATISH PRICE on 9/3/24 at 3:51 PM.       
Absent baseline swallow

## 2024-09-04 ENCOUNTER — APPOINTMENT (OUTPATIENT)
Dept: ALLERGY | Facility: CLINIC | Age: 44
End: 2024-09-04
Payer: COMMERCIAL

## 2024-09-04 VITALS
BODY MASS INDEX: 28.17 KG/M2 | TEMPERATURE: 98.7 F | HEART RATE: 79 BPM | RESPIRATION RATE: 17 BRPM | SYSTOLIC BLOOD PRESSURE: 98 MMHG | DIASTOLIC BLOOD PRESSURE: 68 MMHG | WEIGHT: 165 LBS | HEIGHT: 64 IN | OXYGEN SATURATION: 97 %

## 2024-09-04 DIAGNOSIS — N97.9 FEMALE INFERTILITY: ICD-10-CM

## 2024-09-04 DIAGNOSIS — E88.09 DEFICIENCY OF MANNAN-BINDING PROTEIN: Primary | ICD-10-CM

## 2024-09-04 DIAGNOSIS — D89.89 AUTOIMMUNE DISORDER (MULTI): ICD-10-CM

## 2024-09-04 LAB
C TRACH RRNA SPEC QL NAA+PROBE: NEGATIVE
N GONORRHOEA DNA SPEC QL PROBE+SIG AMP: NEGATIVE

## 2024-09-04 PROCEDURE — 3008F BODY MASS INDEX DOCD: CPT | Performed by: ALLERGY & IMMUNOLOGY

## 2024-09-04 PROCEDURE — 99214 OFFICE O/P EST MOD 30 MIN: CPT | Performed by: ALLERGY & IMMUNOLOGY

## 2024-09-04 NOTE — PROGRESS NOTES
Patient ID: Jocelyn Dee is a 44 y.o. female.     Chief Complaint: follow up, last seen 6/10/2024  History Of Present Illness  Jocelyn Dee is a 44 y.o. female with PMx  presenting for follow up.   Having immune evaluation due to history of recurrent pregnancy loss.    Planning IVF round in November.    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  Plans continuation of fertility therapy with another trial in November.    Review of Systems    Pertinent positives and negatives have been assessed in the HPI. All other systems have been reviewed and are negative except as noted in the HPI.    Allergies  Patient has no known allergies.    Past Medical History  She has a past medical history of Papillomavirus as the cause of diseases classified elsewhere, Personal history of other complications of pregnancy, childbirth and the puerperium, Personal history of other diseases of the female genital tract, and Personal history of other infectious and parasitic diseases.    Family History  Family History   Problem Relation Name Age of Onset    Breast cancer Mother      Diabetes Other      Drug abuse Other      Heart disease Other           Surgical History  She has a past surgical history that includes Other surgical history (01/11/2022) and Other surgical history (01/11/2022).    Social/Environmental History  She reports that she has never smoked. She has never used smokeless tobacco. She reports current alcohol use. She reports that she does not use drugs.        MEDICATIONS  Current Outpatient Medications on File Prior to Visit   Medication Sig Dispense Refill    cholecalciferol (Vitamin D-3) 25 MCG (1000 UT) capsule Take 1 capsule (25 mcg) by mouth once daily.      letrozole  "(Femara) 2.5 mg tablet Take 2 tablets (5 mg total) by mouth once daily.  Take with or without food. 60 tablet 2    leuprolide, 1-month, (Lupron Depot) 3.75 mg injection Inject 3.75 mg into the muscle every 30 (thirty) days. 1 kit 2    levothyroxine (Synthroid, Levoxyl) 50 mcg tablet Take 1 tablet (50 mcg) by mouth once daily. 30 tablet 2    prenat.vits,shellie,min-iron-folic tablet Take 1 tablet by mouth once daily.      sertraline (Zoloft) 50 mg tablet Take 1 tablet (50 mg) by mouth once daily. 90 tablet 3    Soolantra 1 % cream 1 Application once daily. APPLY TOPICALLY TO FACE 1 TIME IN THE MORNING      medroxyPROGESTERone (Provera) 10 mg tablet Take 1 tablet (10 mg) by mouth once daily. (Patient not taking: Reported on 9/4/2024) 7 tablet 0    [DISCONTINUED] leuprolide, 1-month, (Lupron Depot) 3.75 mg injection Inject 3.75 mg into the muscle every 30 (thirty) days. 1 kit 2    [DISCONTINUED] sertraline (Zoloft) 50 mg tablet Take 1 tablet (50 mg) by mouth once daily. 90 tablet 0     No current facility-administered medications on file prior to visit.         Physical Exam  Visit Vitals  BP 98/68   Pulse 79   Temp 37.1 °C (98.7 °F) (Temporal)   Resp 17   Ht 1.626 m (5' 4\")   Wt 74.8 kg (165 lb)   SpO2 97%   BMI 28.32 kg/m²   OB Status Having periods   Smoking Status Never   BSA 1.84 m²       Wt Readings from Last 1 Encounters:   09/04/24 74.8 kg (165 lb)       Physical Exam    General: Well appearing, no acute distress  Head: Normocephalic, atraumatic  Eyes: non-injected  Nose: No nasal crease, nares patent  Throat: Normal dentition  Lungs: effort normal  Extremities: Moves all extremities symmetrically, no edema  Skin: No rashes/lesions  Psych: normal mood and affect    LAB RESULTS:  CBC:  Recent Labs     05/04/24  1701 04/22/24  0839 01/11/23  1140   WBC 18.9* 7.1  7.1 5.8   HGB 15.5 13.9  13.9 14.7   HCT 46.1* 41.9  41.9 43.0    348  348 303   MCV 91 93  93 93   EOSABS 0.09 0.14 0.09       CMP:  Recent " Labs     05/04/24  1701 04/22/24  0839 01/11/23  1140    139 140   K 3.5 4.4 3.7    105 105   CO2 23 26 27   ANIONGAP 15 12 12   BUN 9 8 8   CREATININE 0.69 0.76 0.66   EGFR >90 >90  --    MG  --   --  2.30     Recent Labs     05/04/24  1701 04/22/24  0839 01/11/23  1140   ALBUMIN 4.7 4.5 4.4   ALKPHOS 31* 33 44   ALT 17 20 24   AST 15 15 25   BILITOT 0.4 0.5 0.5   LIPASE 13  --   --      Recent Labs     05/04/24  1701 04/22/24  0839 01/11/23  1140   EOSABS 0.09 0.14 0.09       IMMUNO:   Recent Labs     04/22/24  0839                COAG:   Recent Labs     05/04/24  1802   INR 1.2*       ABO:   Recent Labs     05/04/24  1802   ABO A       HEME/ENDO:  Recent Labs     09/03/24  1139 04/22/24  0839 12/20/23  0913 04/24/23  1714 07/21/22  1727 04/29/22  1040   TSH 0.91  --  1.54 1.05   < >  --    HGBA1C  --  5.2  --   --   --  5.4    < > = values in this interval not displayed.          Component  Ref Range & Units 1 mo ago 4 mo ago   Serotype 1  ug/mL 2.19 0.42   Serotype 2  ug/mL 13.74 0.44   Serotype 3  ug/mL 4.35 0.64   Serotype 4  ug/mL 8.17 0.98   Serotype 5  ug/mL 5.11 0.63   Serotype 8  ug/mL 7.77 0.22   Serotype 9N  ug/mL 8.10 0.29   Serotype 12F  ug/mL 1.50 0.13   Serotype 14  ug/mL 1.92 0.21   Serotype 17F  ug/mL 10.56 0.46   Serotype 19F  ug/mL 10.95 2.78   Serotype 20  ug/mL 13.35 1.97   Serotype 22F  ug/mL 2.12 0.07   Serotype 23F  ug/mL 5.20 0.21   Serotype 6B(26)  ug/mL 0.41 0.28   Serotype 10A(34)  ug/mL 3.57 1.49   Serotype 11A(43)  ug/mL >3.45 0.93   Serotype 7F(51)  ug/mL 18.57 0.32   Serotype 15B(54)  ug/mL 7.21 0.33   Serotype 18C(56)  ug/mL >11.15 0.69   Serotype 19A(57)  ug/mL 1.30 1.19   Serotype 9V(68)  ug/mL >11.73 0.80   Serotype 33F(70)  ug/mL >13.39 5.91   Pneumo Serotype Interpretation See Note See Note CM   Comment: INTERPRETIVE INFORMATION: Streptococcus pneumoniae Antibodies, IgG       Assessment/Plan   Jocelyn is a 45 yo woman with history of MBL  deficiency. She had an excellent response to Pneumovax booster. 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  She will follow up in 6-12 months for recheck, but sooner if recurrent illness develops.  Valeri Newton DO

## 2024-09-05 ENCOUNTER — APPOINTMENT (OUTPATIENT)
Dept: PHYSICAL THERAPY | Facility: CLINIC | Age: 44
End: 2024-09-05
Payer: COMMERCIAL

## 2024-09-05 ENCOUNTER — TELEPHONE (OUTPATIENT)
Dept: ENDOCRINOLOGY | Facility: CLINIC | Age: 44
End: 2024-09-05

## 2024-09-05 ENCOUNTER — APPOINTMENT (OUTPATIENT)
Dept: PODIATRY | Facility: CLINIC | Age: 44
End: 2024-09-05
Payer: COMMERCIAL

## 2024-09-05 DIAGNOSIS — M76.61 ACHILLES TENDINITIS OF BOTH LOWER EXTREMITIES: ICD-10-CM

## 2024-09-05 DIAGNOSIS — M76.62 ACHILLES TENDINITIS OF BOTH LOWER EXTREMITIES: ICD-10-CM

## 2024-09-05 DIAGNOSIS — R20.2 TINGLING OF BOTH FEET: Primary | ICD-10-CM

## 2024-09-05 DIAGNOSIS — G57.53 TARSAL TUNNEL SYNDROME OF BOTH LOWER EXTREMITIES: ICD-10-CM

## 2024-09-05 DIAGNOSIS — N97.9 FEMALE INFERTILITY: ICD-10-CM

## 2024-09-05 PROCEDURE — 99203 OFFICE O/P NEW LOW 30 MIN: CPT | Performed by: PODIATRIST

## 2024-09-05 NOTE — TELEPHONE ENCOUNTER
TC returned to pt. [facility] be [facility] to pt tomorrow, but not sure what time it will arrive. Will plan for repeat P4 and HCG on 9/9 - will place in huddle. If labs appropriate, will schedule for Tuesday 9/10 for injection. Pt is agreeable to this plan.   Rosie Rivero 09/05/24 1:49 PM

## 2024-09-05 NOTE — TELEPHONE ENCOUNTER
Reason for call: Patient is calling to talk to a nurse about her Lupron delivery from  specialty pharmacy.  Notes:

## 2024-09-05 NOTE — TELEPHONE ENCOUNTER
Reason for call: Call Back  Notes: [facility] stated [facility] unable to [facility] until next week.

## 2024-09-05 NOTE — TELEPHONE ENCOUNTER
Reason for call: Patient is calling back to talk to Jennifer or Rosie about her Lupron. Please call her.  Notes:

## 2024-09-05 NOTE — TELEPHONE ENCOUNTER
Returned patient's call. [facility] and they are not going to be able to have the Lupron delivered until next Thursday 9/12. Reordered bhcg and p4 for patient to go to a  lab when her lupron is delivered so she can repeat this blood work to confirm start of suppression.   Patient will update the office when she gets confirmation of delivery and when she gets the lab work completed.     Jennifer Christian 09/05/24 11:30 AM

## 2024-09-05 NOTE — PROGRESS NOTES
History Of Present Illness  Jocelyn Dee is a 44 y.o. female presenting with chief complaint of: transition of care for b/l tarsal tunnel.  Dx by neurology 1.5 years ago  When she walks dog, gets tingling  She teaches yoga  Saw DPM in [place] - gave her orthotics  No medication patient is unsure if she has a rheumatological disorder.  She is getting worked up for systemic lupus and Sjogren's syndrome she is also on Plaquenil.    PCP Chanda Addison MD  Last visit 8/28/24 with Priya MUJICA     Past Medical History  She has a past medical history of Papillomavirus as the cause of diseases classified elsewhere, Personal history of other complications of pregnancy, childbirth and the puerperium, Personal history of other diseases of the female genital tract, and Personal history of other infectious and parasitic diseases.    Surgical History  She has a past surgical history that includes Other surgical history (01/11/2022) and Other surgical history (01/11/2022).     Social History  She reports that she has never smoked. She has never used smokeless tobacco. She reports current alcohol use. She reports that she does not use drugs.    Family History  Family History   Problem Relation Name Age of Onset    Breast cancer Mother      Diabetes Other      Drug abuse Other      Heart disease Other          Allergies  Patient has no known allergies.    Medications  Current Outpatient Medications   Medication Sig Dispense Refill    cholecalciferol (Vitamin D-3) 25 MCG (1000 UT) capsule Take 1 capsule (25 mcg) by mouth once daily.      letrozole (Femara) 2.5 mg tablet Take 2 tablets (5 mg total) by mouth once daily.  Take with or without food. 60 tablet 2    leuprolide, 1-month, (Lupron Depot) 3.75 mg injection Inject 3.75 mg into the muscle every 30 (thirty) days. 1 kit 2    levothyroxine (Synthroid, Levoxyl) 50 mcg tablet Take 1 tablet (50 mcg) by mouth once daily. 30 tablet 2    medroxyPROGESTERone (Provera) 10 mg  tablet Take 1 tablet (10 mg) by mouth once daily. 7 tablet 0    prenat.vits,shellie,min-iron-folic tablet Take 1 tablet by mouth once daily.      sertraline (Zoloft) 50 mg tablet Take 1 tablet (50 mg) by mouth once daily. 90 tablet 3    Soolantra 1 % cream 1 Application once daily. APPLY TOPICALLY TO FACE 1 TIME IN THE MORNING       No current facility-administered medications for this visit.       Review of Systems    REVIEW OF SYSTEMS  GENERAL:  Negative for malaise, significant weight loss, fever  CARDIOVASCULAR: leg swelling   MUSCULOSKELETAL:  Negative for joint pain or swelling, back pain, and muscle pain.  SKIN:  Negative for lesions, rash, and itching  PSYCH:  Negative for sleep disturbance, mood disorder and recent psychosocial stressors  NEURO: Negative, denies any burning, tingling or numbness     Objective:   Vasc: DP and PT pulses are palpable bilateral.  CFT is less than 3 seconds bilateral.  Skin temperature is warm to cool proximal to distal bilateral.      Neuro:  Light touch is intact to the foot bilateral.  Protective sensation is intact to the foot when tested with the 5.07 SWM bilateral.  There is no clonus noted.  The hallux is downgoing bilateral.  Patient has reflexes 2 out of 4 patellar.  Vibratory sensation is intact.  Cold sensation is intact.  Patient has positive Tinel's sign bilateral    Derm: Nails are normal. Skin is supple with normal texture and turgor noted.  Webspaces are clean, dry and intact bilateral.  There are no hyperkeratoses, ulcerations, verruca or other lesions noted.      Ortho: Muscle strength is 5/5 for all pedal groups tested.  Mild pes cavus is noted.  Patient does not have reproducible pain.  Patient does have tightness of her Achilles.  Assessment/Plan     Diagnoses and all orders for this visit:  Tingling of both feet  -     Referral to Physical Therapy; Future  Achilles tendinitis of both lower extremities  -     Referral to Physical Therapy; Future  Tarsal tunnel  syndrome of both lower extremities  -     Referral to Physical Therapy; Future      We discussed multiple options.  Patient will be started in physical therapy for her tight Achilles.  We discussed the option of gabapentin.  At this time patient would like to hold off.  I have advised the patient to follow-up with her pain management doctor regarding lumbar injections.  If she does have relief with lumbar injections, we can continue with orthotics for mild tarsal tunnel syndrome.           Bernie Mcknight, CMA

## 2024-09-05 NOTE — TELEPHONE ENCOUNTER
TC to pt - returning hers. LM for pt to call the office back.   Rosie Barcenasaney 09/05/24 8:45 AM    Pt called back - she finally got insurance coverage for the Lupron Depot, but does not yet have it. Advised pt is to have it shipped to her, then bring it to the office to have RN inject. The medication is at [facility]. Pt will call back once she confirms when delivery will be; aware that we may need to have her labs repeated before administration.   Pt does have the Provera and Letrozole at home and understands not to start these until administration of Lupron .  Rosie Barcenasaney 09/05/24 10:17 AM

## 2024-09-09 ENCOUNTER — APPOINTMENT (OUTPATIENT)
Dept: PHYSICAL THERAPY | Facility: CLINIC | Age: 44
End: 2024-09-09
Payer: COMMERCIAL

## 2024-09-09 ENCOUNTER — DOCUMENTATION (OUTPATIENT)
Dept: ENDOCRINOLOGY | Facility: CLINIC | Age: 44
End: 2024-09-09

## 2024-09-09 ENCOUNTER — TELEPHONE (OUTPATIENT)
Dept: ENDOCRINOLOGY | Facility: CLINIC | Age: 44
End: 2024-09-09

## 2024-09-09 ENCOUNTER — LAB (OUTPATIENT)
Dept: LAB | Facility: LAB | Age: 44
End: 2024-09-09
Payer: COMMERCIAL

## 2024-09-09 DIAGNOSIS — N97.9 FEMALE INFERTILITY: ICD-10-CM

## 2024-09-09 LAB
B-HCG SERPL-ACNC: <3 MIU/ML
PROGEST SERPL-MCNC: 0.3 NG/ML

## 2024-09-09 PROCEDURE — 84702 CHORIONIC GONADOTROPIN TEST: CPT

## 2024-09-09 PROCEDURE — 36415 COLL VENOUS BLD VENIPUNCTURE: CPT

## 2024-09-09 PROCEDURE — 84144 ASSAY OF PROGESTERONE: CPT

## 2024-09-09 NOTE — TELEPHONE ENCOUNTER
Returned patient's call. Patient had repeat labs done today to confirm start of endo suppression. Patient does not get regular menses, and plan was to start Lupron, Letrozole and Provera with negative bhcg and p4. Message sent to Dr. Chong to confirm, and patient will plan to come in tomorrow morning for Lupron Depot injection. Patient confirmed she has on hand and will come to office tomorrow around 11am.     Jennifer Christian 09/09/24 3:24 PM

## 2024-09-09 NOTE — TELEPHONE ENCOUNTER
Reason for call: Patient is calling to talk to a nurse about her Lupron shot that she has to do tomorrow.  Notes:

## 2024-09-09 NOTE — PROGRESS NOTES
MD Jennifer Swartz RN  yes        ----- Message -----  From: Jennifer Christian RN  Sent: 9/9/2024   3:22 PM EDT  To: Jaymie Chong MD  Subject: Suppression start                                Hi Dr. Chong, This patient went for repeat labs since it has been a week, to confirm start of Lupron - just want to make sure we are good to proceed tomorrow with Lupron, Letrozole and Provera for endo suppression!                                          Latest Ref Rng     9/9/2024    Progesterone                      ng/mL                    0.3              HCG, Beta-Quantitative     <5 mIU/mL            <3

## 2024-09-10 ENCOUNTER — CLINICAL SUPPORT (OUTPATIENT)
Dept: ENDOCRINOLOGY | Facility: CLINIC | Age: 44
End: 2024-09-10
Payer: COMMERCIAL

## 2024-09-10 DIAGNOSIS — N80.9 ENDOMETRIOSIS: Primary | ICD-10-CM

## 2024-09-10 DIAGNOSIS — N97.9 FEMALE INFERTILITY: ICD-10-CM

## 2024-09-10 RX ORDER — LEUPROLIDE ACETATE 3.75 MG
3.75 KIT INTRAMUSCULAR
Start: 2024-09-10 | End: 2025-09-10

## 2024-09-10 NOTE — PROGRESS NOTES
Pt brought in Lupron Depot 3.75 mg; RN reviewed possible side effects with pt  Lot # [id number]  EXP 9/24/2026.  given in LDG site   Pt tolerated well.   Pt to being Provera 10 mg x 7 days and Letrozole 5 mg daily x 6- days.   Plans to call the [facility] office to schedule repeat injection in 4 weeks.   Rosie Rivero 09/10/24 12:05 PM

## 2024-09-11 ENCOUNTER — APPOINTMENT (OUTPATIENT)
Dept: OBSTETRICS AND GYNECOLOGY | Facility: CLINIC | Age: 44
End: 2024-09-11
Payer: COMMERCIAL

## 2024-10-08 ENCOUNTER — DOCUMENTATION (OUTPATIENT)
Dept: ENDOCRINOLOGY | Facility: CLINIC | Age: 44
End: 2024-10-08

## 2024-10-08 ENCOUNTER — APPOINTMENT (OUTPATIENT)
Dept: ENDOCRINOLOGY | Facility: CLINIC | Age: 44
End: 2024-10-08
Payer: COMMERCIAL

## 2024-10-08 DIAGNOSIS — Z31.83 ENCOUNTER FOR ASSISTED REPRODUCTIVE FERTILITY CYCLE: Primary | ICD-10-CM

## 2024-10-08 DIAGNOSIS — Z01.812 ENCOUNTER FOR PREPROCEDURAL LABORATORY EXAMINATION: ICD-10-CM

## 2024-10-08 LAB — PREGNANCY TEST URINE, POC: NEGATIVE

## 2024-10-08 PROCEDURE — 2500000004 HC RX 250 GENERAL PHARMACY W/ HCPCS (ALT 636 FOR OP/ED): Mod: JZ,JG | Performed by: STUDENT IN AN ORGANIZED HEALTH CARE EDUCATION/TRAINING PROGRAM

## 2024-10-08 PROCEDURE — 96372 THER/PROPH/DIAG INJ SC/IM: CPT | Performed by: STUDENT IN AN ORGANIZED HEALTH CARE EDUCATION/TRAINING PROGRAM

## 2024-10-08 NOTE — PROGRESS NOTES
MD Rosie Swartz, RN  Yes that is fine          Previous Messages       ----- Message -----  From: Rosie Rivero RN  Sent: 10/8/2024   9:24 AM EDT  To: Jaymie Chong MD  Subject: Progesterone change?                            HI    I am getting Jocelyn set up for FET for 11/27    She is very concerned about her right hip/leg with re: to the ROBB - she still has numbness down her leg (also has disc issues).    Are you ok with her trying Ethly Oleate to decrease the volume and viscosity of the injection to see if it helps?    Thanks,    Rosie

## 2024-10-08 NOTE — PROGRESS NOTES
Here for Lupron Depot injection, # 2 of 2; p also on Letrozole 5 mg daily for endo-suppression; UPT negative; Injection given without difficulty. Pt does have concerns about daily IM ROBB for her FET as she still has numbness down her left leg from prior injections as well as disc issues in her back. RN will review with DR Silva (pt has already discussed with her)     Discussed with pt likely FET end of November; RN will review MD calendar and set up tentatively; RN to review checklist and MC pt outstanding items.  - pt aware she does still need hysteroscopy and can call to schedule anytime this month.     Rosie Rivero 10/08/24 7:31 AM

## 2024-10-09 ENCOUNTER — TELEPHONE (OUTPATIENT)
Dept: PHYSICAL THERAPY | Facility: CLINIC | Age: 44
End: 2024-10-09
Payer: COMMERCIAL

## 2024-10-09 NOTE — PROGRESS NOTES
Physical Therapy                 Therapy Communication Note    Patient Name: Jocelyn Dee  MRN: [id number]  Department: [facility]  Today's Date: 10/9/2024     Discipline: Physical Therapy    Missed Visit Reason:      Missed Time: No Show    Comment: VM left at phone # provided regarding missed appt and reminder for next visit. Asked pt to call if she cannot make appt.

## 2024-10-16 ENCOUNTER — TELEPHONE (OUTPATIENT)
Dept: PHYSICAL THERAPY | Facility: CLINIC | Age: 44
End: 2024-10-16

## 2024-10-16 NOTE — PROGRESS NOTES
Physical Therapy                 Therapy Communication Note    Patient Name: Jocelyn Dee  MRN: [id number]  Department:   [facility]  Today's Date: 10/16/2024     Discipline: Physical Therapy    Missed Visit Reason:      Missed Time: No Show     Comment: second no show for an eval

## 2024-10-27 ENCOUNTER — PREP FOR PROCEDURE (OUTPATIENT)
Dept: ENDOCRINOLOGY | Facility: CLINIC | Age: 44
End: 2024-10-27
Payer: COMMERCIAL

## 2024-10-27 RX ORDER — IBUPROFEN 200 MG
600 TABLET ORAL EVERY 6 HOURS PRN
Status: CANCELLED | OUTPATIENT
Start: 2024-10-27

## 2024-10-27 RX ORDER — ACETAMINOPHEN 325 MG/1
650 TABLET ORAL ONCE AS NEEDED
Status: CANCELLED | OUTPATIENT
Start: 2024-10-27

## 2024-10-28 ENCOUNTER — HOSPITAL ENCOUNTER (OUTPATIENT)
Dept: ENDOCRINOLOGY | Facility: CLINIC | Age: 44
Discharge: HOME | End: 2024-10-28
Payer: COMMERCIAL

## 2024-10-28 VITALS
HEIGHT: 64 IN | HEART RATE: 79 BPM | BODY MASS INDEX: 27.74 KG/M2 | DIASTOLIC BLOOD PRESSURE: 92 MMHG | WEIGHT: 162.48 LBS | RESPIRATION RATE: 18 BRPM | TEMPERATURE: 97.7 F | SYSTOLIC BLOOD PRESSURE: 149 MMHG | OXYGEN SATURATION: 97 %

## 2024-10-28 DIAGNOSIS — N71.9 ENDOMETRITIS: Primary | ICD-10-CM

## 2024-10-28 DIAGNOSIS — Z31.41 FERTILITY TESTING: ICD-10-CM

## 2024-10-28 LAB — PREGNANCY TEST URINE, POC: NEGATIVE

## 2024-10-28 PROCEDURE — 64450 NJX AA&/STRD OTHER PN/BRANCH: CPT | Performed by: OBSTETRICS & GYNECOLOGY

## 2024-10-28 PROCEDURE — 7100000009 HC PHASE TWO TIME - INITIAL BASE CHARGE

## 2024-10-28 PROCEDURE — 7100000010 HC PHASE TWO TIME - EACH INCREMENTAL 1 MINUTE

## 2024-10-28 PROCEDURE — 58555 HYSTEROSCOPY DX SEP PROC: CPT | Performed by: OBSTETRICS & GYNECOLOGY

## 2024-10-28 RX ORDER — DOXYCYCLINE 100 MG/1
100 CAPSULE ORAL 2 TIMES DAILY
Qty: 28 CAPSULE | Refills: 0 | Status: SHIPPED | OUTPATIENT
Start: 2024-10-28 | End: 2024-11-11

## 2024-10-28 RX ORDER — ACETAMINOPHEN 325 MG/1
650 TABLET ORAL ONCE AS NEEDED
Status: DISCONTINUED | OUTPATIENT
Start: 2024-10-28 | End: 2024-10-29 | Stop reason: HOSPADM

## 2024-10-28 RX ORDER — IBUPROFEN 200 MG
600 TABLET ORAL EVERY 6 HOURS PRN
Status: DISCONTINUED | OUTPATIENT
Start: 2024-10-28 | End: 2024-10-29 | Stop reason: HOSPADM

## 2024-10-28 ASSESSMENT — PAIN SCALES - GENERAL
PAINLEVEL_OUTOF10: 0 - NO PAIN
PAINLEVEL_OUTOF10: 0 - NO PAIN

## 2024-10-28 ASSESSMENT — PAIN - FUNCTIONAL ASSESSMENT
PAIN_FUNCTIONAL_ASSESSMENT: 0-10
PAIN_FUNCTIONAL_ASSESSMENT: 0-10

## 2024-10-29 ENCOUNTER — TELEPHONE (OUTPATIENT)
Dept: ENDOCRINOLOGY | Facility: CLINIC | Age: 44
End: 2024-10-29
Payer: COMMERCIAL

## 2024-10-29 DIAGNOSIS — N97.9 FEMALE INFERTILITY: ICD-10-CM

## 2024-10-29 DIAGNOSIS — Z31.83 ENCOUNTER FOR ASSISTED REPRODUCTIVE FERTILITY CYCLE: ICD-10-CM

## 2024-10-29 RX ORDER — PROGESTERONE 200 MG/1
200 CAPSULE ORAL 2 TIMES DAILY
Qty: 60 CAPSULE | Refills: 2 | Status: SHIPPED | OUTPATIENT
Start: 2024-10-29 | End: 2025-10-29

## 2024-10-29 RX ORDER — PREDNISONE 10 MG/1
10 TABLET ORAL DAILY
Qty: 30 TABLET | Refills: 2 | Status: SHIPPED | OUTPATIENT
Start: 2024-10-29 | End: 2024-10-30 | Stop reason: DRUGHIGH

## 2024-10-29 RX ORDER — ESTRADIOL 0.1 MG/D
3 FILM, EXTENDED RELEASE TRANSDERMAL
Qty: 48 PATCH | Refills: 2 | Status: SHIPPED | OUTPATIENT
Start: 2024-10-29 | End: 2025-10-29

## 2024-10-30 ENCOUNTER — TELEPHONE (OUTPATIENT)
Dept: ENDOCRINOLOGY | Facility: CLINIC | Age: 44
End: 2024-10-30
Payer: COMMERCIAL

## 2024-10-30 DIAGNOSIS — N97.9 FEMALE INFERTILITY: ICD-10-CM

## 2024-10-30 RX ORDER — PREDNISONE 10 MG/1
5 TABLET ORAL DAILY
Qty: 30 TABLET | Refills: 2 | Status: SHIPPED | OUTPATIENT
Start: 2024-10-30 | End: 2025-10-30

## 2024-11-06 ENCOUNTER — PATIENT MESSAGE (OUTPATIENT)
Dept: ENDOCRINOLOGY | Facility: CLINIC | Age: 44
End: 2024-11-06

## 2024-11-06 ENCOUNTER — TELEPHONE (OUTPATIENT)
Dept: ENDOCRINOLOGY | Facility: CLINIC | Age: 44
End: 2024-11-06

## 2024-11-06 DIAGNOSIS — N97.9 FEMALE INFERTILITY: ICD-10-CM

## 2024-11-06 PROCEDURE — RXMED WILLOW AMBULATORY MEDICATION CHARGE

## 2024-11-06 RX ORDER — LIDOCAINE AND PRILOCAINE 25; 25 MG/G; MG/G
CREAM TOPICAL DAILY
Qty: 30 G | Refills: 2 | Status: SHIPPED | OUTPATIENT
Start: 2024-11-06 | End: 2025-11-06

## 2024-11-06 RX ORDER — ESTRADIOL 0.1 MG/D
3 FILM, EXTENDED RELEASE TRANSDERMAL
Qty: 48 PATCH | Refills: 2 | Status: SHIPPED | OUTPATIENT
Start: 2024-11-07 | End: 2025-11-07

## 2024-11-06 RX ORDER — PROPYLENE GLYCOL/PEG 400 0.3 %-0.4%
1 DROPS OPHTHALMIC (EYE) DAILY
Qty: 30 EACH | Refills: 3 | Status: SHIPPED | OUTPATIENT
Start: 2024-11-06

## 2024-11-06 NOTE — PROGRESS NOTES
Patient called with menses for FET setup. LMP: none ; will start Estrace patches today  Plan:   ADDENDUM  9/3/2024  Fertility Plan Update:  Lupron + Letrozole x 2 months prior to FET- follow up P4 level today, if negatives start Provera 10 mg daily  x 7 days and start Lupron and Letrozole same time  For next FET-  Programmed FET  - Estrace patches, IM P4 100 mg+ vaginal prometrium 200 mg BID (starting 1 day after IM P4)  Prednisone  5 mg ( per rheumatologist request - cleared with Dr Su) to start with progesterone per protocol  Stay on Plaquenil per rheumatology recommendations    Tentative lining check: 11/19/2024   Tentative progesterone start: 11/22/2024   Tentative transfer date: 11/27/2024   Number of days of progesterone for transfer: 6   Treatment plan: yes   Embryo # confirmed: 7 untested blasts at [facility] (donor egg)   Embryo # to transfer: 1     Jaymie Chong 11/06/24 1:08 PM

## 2024-11-07 ENCOUNTER — SPECIALTY PHARMACY (OUTPATIENT)
Dept: PHARMACY | Facility: CLINIC | Age: 44
End: 2024-11-07

## 2024-11-07 ENCOUNTER — APPOINTMENT (OUTPATIENT)
Dept: ENDOCRINOLOGY | Facility: CLINIC | Age: 44
End: 2024-11-07
Payer: COMMERCIAL

## 2024-11-08 ENCOUNTER — PHARMACY VISIT (OUTPATIENT)
Dept: PHARMACY | Facility: CLINIC | Age: 44
End: 2024-11-08
Payer: MEDICARE

## 2024-11-19 ENCOUNTER — LAB (OUTPATIENT)
Dept: LAB | Facility: LAB | Age: 44
End: 2024-11-19
Payer: COMMERCIAL

## 2024-11-19 ENCOUNTER — ANCILLARY PROCEDURE (OUTPATIENT)
Dept: ENDOCRINOLOGY | Facility: CLINIC | Age: 44
End: 2024-11-19
Payer: COMMERCIAL

## 2024-11-19 DIAGNOSIS — N97.9 FEMALE INFERTILITY: ICD-10-CM

## 2024-11-19 LAB
ESTRADIOL SERPL-MCNC: 512 PG/ML
PROGEST SERPL-MCNC: 0.2 NG/ML

## 2024-11-19 PROCEDURE — 84144 ASSAY OF PROGESTERONE: CPT

## 2024-11-19 PROCEDURE — 82670 ASSAY OF TOTAL ESTRADIOL: CPT

## 2024-11-19 PROCEDURE — 76857 US EXAM PELVIC LIMITED: CPT | Performed by: OBSTETRICS & GYNECOLOGY

## 2024-11-19 PROCEDURE — 76857 US EXAM PELVIC LIMITED: CPT

## 2024-11-19 PROCEDURE — 36415 COLL VENOUS BLD VENIPUNCTURE: CPT

## 2024-11-19 RX ORDER — PROGESTERONE 50 MG/ML
100 INJECTION, SOLUTION INTRAMUSCULAR DAILY
Start: 2024-11-19 | End: 2025-11-19

## 2024-11-19 NOTE — PROGRESS NOTES
CYCLING NOTE- LINING CHECK    Here for US and/or lab monitoring; relevant findings reviewed.    Programmed FET with estrace patches, IM P4 100mg + vaginal prometrium 200mg BID (starting one day after IM P4), prednisone 5mg (ok per Dr. Chong) to start with progesterone, and stay on plaquenil    Progesterone start on 11/22 for transfer on 11/27. 7 blasts at [facility] (donor egg).    Patient did not stay for discussion after monitoring,  Team will contact patient later today with results and plan.    Catie Melchor  11/19/2024  9:45 AM    Called and left voicemail with plan as well as left [email or web address] message. Will continue patches, will start ROBB 100mg on Friday and start vaginal progesterone Saturday. Will also start prednisone Friday. Will get a call the day prior to FET with what time to arrive.  Catie Melchor 11/19/24 3:34 PM

## 2024-11-21 ENCOUNTER — APPOINTMENT (OUTPATIENT)
Dept: ENDOCRINOLOGY | Facility: CLINIC | Age: 44
End: 2024-11-21
Payer: COMMERCIAL

## 2024-11-25 DIAGNOSIS — E03.8 SUBCLINICAL HYPOTHYROIDISM: ICD-10-CM

## 2024-11-25 RX ORDER — LEVOTHYROXINE SODIUM 50 UG/1
50 TABLET ORAL DAILY
Qty: 30 TABLET | Refills: 2 | Status: SHIPPED | OUTPATIENT
Start: 2024-11-25 | End: 2025-02-23

## 2024-11-26 ENCOUNTER — TELEPHONE (OUTPATIENT)
Dept: ENDOCRINOLOGY | Facility: CLINIC | Age: 44
End: 2024-11-26
Payer: COMMERCIAL

## 2024-11-26 ENCOUNTER — PREP FOR PROCEDURE (OUTPATIENT)
Dept: ENDOCRINOLOGY | Facility: CLINIC | Age: 44
End: 2024-11-26
Payer: COMMERCIAL

## 2024-11-26 ENCOUNTER — SPECIALTY PHARMACY (OUTPATIENT)
Dept: PHARMACY | Facility: CLINIC | Age: 44
End: 2024-11-26

## 2024-11-26 RX ORDER — ACETAMINOPHEN 325 MG/1
650 TABLET ORAL ONCE AS NEEDED
Status: CANCELLED | OUTPATIENT
Start: 2024-11-26

## 2024-11-26 NOTE — TELEPHONE ENCOUNTER
Returned patient call, no answer let voicemail to call back if she still has questions.  Catie Melchor 11/26/24 1:59 PM    Pt called back- said she had a couple glasses of wine last night and a couple weeks ago she was in [place] and had multiple drinks-- pt worried this may have messed up her FET cycle. Explained we tell you to limit alcohol use during this time and maintain healthy lifestyle but this should not jeopardize FET cycle, reminded no alcohol use after FET. Pt agreeable.  Catie Ruiz 11/26/24 2:06 PM

## 2024-11-27 ENCOUNTER — HOSPITAL ENCOUNTER (OUTPATIENT)
Dept: ENDOCRINOLOGY | Facility: CLINIC | Age: 44
Discharge: HOME | End: 2024-11-27

## 2024-11-27 ENCOUNTER — ANCILLARY PROCEDURE (OUTPATIENT)
Dept: ENDOCRINOLOGY | Facility: CLINIC | Age: 44
End: 2024-11-27

## 2024-11-27 ENCOUNTER — TELEPHONE (OUTPATIENT)
Dept: ENDOCRINOLOGY | Facility: CLINIC | Age: 44
End: 2024-11-27
Payer: COMMERCIAL

## 2024-11-27 DIAGNOSIS — Z31.83 ENCOUNTER FOR ASSISTED REPRODUCTIVE FERTILITY CYCLE: ICD-10-CM

## 2024-11-27 DIAGNOSIS — Z32.00 ENCOUNTER FOR PREGNANCY TEST, RESULT UNKNOWN: Primary | ICD-10-CM

## 2024-11-27 DIAGNOSIS — N97.9 FEMALE INFERTILITY: ICD-10-CM

## 2024-11-27 LAB — PROGEST SERPL-MCNC: 110.3 NG/ML

## 2024-11-27 PROCEDURE — 76998 US GUIDE INTRAOP: CPT | Performed by: OBSTETRICS & GYNECOLOGY

## 2024-11-27 PROCEDURE — 89255 PREPARE EMBRYO FOR TRANSFER: CPT | Performed by: OBSTETRICS & GYNECOLOGY

## 2024-11-27 PROCEDURE — 84144 ASSAY OF PROGESTERONE: CPT

## 2024-11-27 PROCEDURE — 89253 EMBRYO HATCHING: CPT | Performed by: OBSTETRICS & GYNECOLOGY

## 2024-11-27 PROCEDURE — 89352 THAWING CRYOPRESRVED EMBRYO: CPT | Performed by: OBSTETRICS & GYNECOLOGY

## 2024-11-27 PROCEDURE — 58974 EMBRYO TRANSFER INTRAUTERINE: CPT | Performed by: OBSTETRICS & GYNECOLOGY

## 2024-11-27 RX ORDER — ACETAMINOPHEN 325 MG/1
650 TABLET ORAL ONCE AS NEEDED
Status: DISCONTINUED | OUTPATIENT
Start: 2024-11-27 | End: 2024-11-28 | Stop reason: HOSPADM

## 2024-11-27 RX ORDER — ONDANSETRON HYDROCHLORIDE 2 MG/ML
4 INJECTION, SOLUTION INTRAVENOUS ONCE AS NEEDED
OUTPATIENT
Start: 2024-11-27

## 2024-11-27 RX ORDER — LABETALOL HYDROCHLORIDE 5 MG/ML
5 INJECTION, SOLUTION INTRAVENOUS ONCE AS NEEDED
OUTPATIENT
Start: 2024-11-27

## 2024-11-27 RX ORDER — LIDOCAINE HYDROCHLORIDE 10 MG/ML
0.1 INJECTION, SOLUTION INFILTRATION; PERINEURAL ONCE
OUTPATIENT
Start: 2024-11-27 | End: 2024-11-27

## 2024-11-27 RX ORDER — ACETAMINOPHEN 325 MG/1
650 TABLET ORAL EVERY 4 HOURS PRN
OUTPATIENT
Start: 2024-11-27

## 2024-11-27 RX ORDER — HYDROMORPHONE HYDROCHLORIDE 0.2 MG/ML
0.2 INJECTION INTRAMUSCULAR; INTRAVENOUS; SUBCUTANEOUS EVERY 5 MIN PRN
OUTPATIENT
Start: 2024-11-27

## 2024-11-27 NOTE — TELEPHONE ENCOUNTER
Telephone call returned to patient to ensure her that Dr. Chong reviewed her P4 level and is okay with it/happy with it. Patient agreeable and also received a MyChart from procedure area nurses as well.     11/27/24 at 3:57 PM - Hanna Ireland RN

## 2024-11-27 NOTE — TELEPHONE ENCOUNTER
Reason for call: Call Back  Notes: Pt would like to discuss her P4 level following her FET today. She's concerned over her level.

## 2024-11-27 NOTE — DISCHARGE INSTRUCTIONS
[facility]  [address]. [place]  [address]  Tel: [phone]   Fax: [phone]    Home Going Instructions after Embryo Transfer:     After completing your embryo transfer please treat your body as though you are pregnant.  No smoking, alcohol, or recreational drugs.  Make sure you are taking a prenatal vitamin daily.   Continue all medications currently prescribed for embryo transfer until otherwise instructed by your physician, even if you experience spotting or bleeding and even if you have a negative home pregnancy test.   Medications to avoid in pregnancy: Advil, Motrin, Ibuprofen, Aleve, Excedrin, Miroslava-Eveleth, Sudafed, and Pepto-Bismol. Avoid aspirin unless you are taking this daily at the direction of your physician.   Medications that are generally safe in pregnancy: Tylenol, Benadryl, Plain Robitussin, Zyrtec, Claritin, Pepcid, Tums, Rolaids, Mylanta, Nasonex.   Foods to avoid:   Seafood that is high in mercury; you can have canned tuna twice a week.   Deli meats, deli salads, hot dogs, and unpasteurized cheeses due to the risk of Listeria.  Activities to avoid:   No hot tubs, whirlpools, or saunas.  Avoid starting new exercise routines that you have not done previously.  Avoid lifting > 30 lbs.  No cleaning litter boxes.  No intercourse until you test for pregnancy.   You do not need to be on bed rest following the transfer. It is healthy, normal, and recommended to go about routine physical activity.   We advise against taking a home pregnancy test due to the possibility of false negative and false positive results.   You will test for pregnancy in approximately 10 days, at which point you will be about 4 weeks pregnant.   If blood work was drawn on the day of your transfer, you can expect a phone that day with the results of your bloodwork. We expect a progesterone level greater than or equal to 16. If you level is less than 16  we will adjust your progesterone dose and repeat your level in 2 days.     Shobha Sales    11:24 AM

## 2024-11-27 NOTE — PROGRESS NOTES
Patient ID: Jocelyn Dee is a 44 y.o. female.    Embryo Transfer    Date/Time: 11/27/2024 11:53 AM    Performed by: Jaymie Chong MD  Authorized by: Jaymie Chong MD    Consent:     Consent obtained:  Written    Consent given by:  Patient    Procedure risks and benefits discussed: yes      Patient questions answered: yes      Patient agrees, verbalizes understanding, and wants to proceed: yes      Educational handouts given: yes      Instructions and paperwork completed: yes    Procedure:     Pelvic exam performed: No      Cervix cleaned and prepped: Yes      Speculum placed in vagina: Yes      Ultrasound guidance: Yes      Catheter type:  Sure View Balderrama    Difficulty: easy      Estimated blood loss:  None    Embryos transferred:  1    Catheter navigation notes:  Easy afterload    Post-procedure:     Patient tolerated procedure well: yes    Comments:      Preop diagnosis: Infertility  Post op diagnosis: Same  Assistant: Delfino  Depth: 7 cm  Curve: 15 deg ante  Distance from fundus: 12 mm  Embryo stage at day of transfer: day 5  Embryo notes: 4AA   PGT: Not performed  Anesthesia: None    IV Fluids: None  UOP: Not recorded  Specimens: None  Complications: None    Attending Attestation: I was physically present for key and critical portions performed by the fellow. I reviewed the fellow's documentation and discussed the patient with the fellow. I agree with the fellow's medical decision making as documented in the fellow's note.   Jaymie Chong 11/27/24 11:53 AM

## 2024-12-04 ENCOUNTER — TELEPHONE (OUTPATIENT)
Dept: ENDOCRINOLOGY | Facility: CLINIC | Age: 44
End: 2024-12-04
Payer: COMMERCIAL

## 2024-12-04 NOTE — TELEPHONE ENCOUNTER
Returned patient's call. Patient will need refill of Progesterone in Ethyl Oleate and will call [facility]'s pharmacy to make sure there are refills on the Rx. She wants to get enough to get her through her initial bhcg on 12/9. Patient will let us know if there are no refills left.   Patient also stated she developed a slight cold, and given list of safe OTC medications to take in pregnancy. Patient will update if any symptoms worsen - she stated she has some night sweats on and off, but no fever or aches. Patient will contact PCP if she does develop fever/aches/chills to get tested.   Will follow up with patient on 12/9 with initial bhcg.     Jennifer Christian 12/04/24 2:48 PM

## 2024-12-04 NOTE — TELEPHONE ENCOUNTER
Reason for call: Patient is calling to talk to a nurse has questions about her medications  Notes:

## 2024-12-09 ENCOUNTER — CLINICAL SUPPORT (OUTPATIENT)
Dept: ENDOCRINOLOGY | Facility: CLINIC | Age: 44
End: 2024-12-09
Payer: COMMERCIAL

## 2024-12-09 ENCOUNTER — TELEPHONE (OUTPATIENT)
Dept: ENDOCRINOLOGY | Facility: CLINIC | Age: 44
End: 2024-12-09

## 2024-12-09 ENCOUNTER — SPECIALTY PHARMACY (OUTPATIENT)
Dept: PHARMACY | Facility: CLINIC | Age: 44
End: 2024-12-09

## 2024-12-09 DIAGNOSIS — Z32.00 ENCOUNTER FOR PREGNANCY TEST, RESULT UNKNOWN: ICD-10-CM

## 2024-12-09 LAB — B-HCG SERPL-ACNC: 1001 MIU/ML

## 2024-12-09 PROCEDURE — 84702 CHORIONIC GONADOTROPIN TEST: CPT

## 2024-12-09 PROCEDURE — RXMED WILLOW AMBULATORY MEDICATION CHARGE

## 2024-12-09 NOTE — TELEPHONE ENCOUNTER
Attempted to call pt back, left voicemail with results from hcg but will reach back out to patient with plan once discussed in noon huddle.   SATISH PRICE on 12/9/24 at 11:39 AM.

## 2024-12-09 NOTE — TELEPHONE ENCOUNTER
Returned patient's call. Patient's partner to go out of town at the end of the week and wants to know if she can come in for her injections for a few days. Patient will make appointment with  if she needs ROBB injections done in the office. Patient aware that they cannot be done on the weekend.   SATISH PRICE on 12/9/24 at 2:03 PM.

## 2024-12-09 NOTE — TELEPHONE ENCOUNTER
Reason for call:   Notes: Patient would like to speak with her about coming in for her injections.

## 2024-12-09 NOTE — PROGRESS NOTES
Initial HC,001  Patient's [facility] Provider: Jaymie Chong MD  Infertility dx: Diminished Ovarian Reserve  Achieved pregnancy by: Embryo transfer  Fertility medications used this cycle:  patch protocol   LMP: Patient's last menstrual period was 24  EGA based on (IUI date, ET ): embryo transfer date 4W3D today on 24    Updated G/P with this pregnancy:   OB HX:  OB History   No obstetric history on file.       Type & Screen: 2024  ABO: A  Rh: POS  Antibody: NEG  History of miscarriage: No  History of pelvic/abdominal surgeries: No  History of STDs/PID: No  Hx of ectopic: No  Hx of tubal disease/ blockage: No    Risk for ectopic: No      Current medications:     Current Outpatient Medications:     cholecalciferol (Vitamin D-3) 25 MCG (1000 UT) capsule, Take 1 capsule (25 mcg) by mouth once daily., Disp: , Rfl:     estradiol (Vivelle-Dot) 0.1 mg/24 hr patch, Place 3 patches over 48 hours on the skin 4 times a week. Apply three (3) new patches to lower stomach once every other day as directed per provider., Disp: 48 patch, Rfl: 2    estradiol (Vivelle-Dot) 0.1 mg/24 hr patch, Apply three (3) new patches to lower stomach once every other day as directed per provider., Disp: 48 patch, Rfl: 2    leuprolide, 1-month, (Lupron Depot) 3.75 mg injection, Inject 3.75 mg into the muscle every 30 (thirty) days., Disp: , Rfl:     levothyroxine (Synthroid, Levoxyl) 50 mcg tablet, Take 1 tablet (50 mcg) by mouth once daily., Disp: 30 tablet, Rfl: 2    lidocaine-prilocaine (Emla) 2.5-2.5 % cream, Apply topically once daily. Apply to treatment area 1 hour prior to injection., Disp: 30 g, Rfl: 2    medroxyPROGESTERone (Provera) 10 mg tablet, Take 1 tablet (10 mg) by mouth once daily., Disp: 7 tablet, Rfl: 0    predniSONE (Deltasone) 10 mg tablet, Take 0.5 tablets (5 mg) by mouth once daily. Begin taking the day you start progesterone in oil., Disp: 30 tablet, Rfl: 2    prenat.vits,shellie,min-iron-folic tablet, Take  "1 tablet by mouth once daily., Disp: , Rfl:     progesterone (Prometrium) 200 mg capsule, Insert 1 capsule (200 mg) into the vagina 2 times a day. Insert 1 capsule (200 mg) into the vagina 2 times a day, Disp: 60 capsule, Rfl: 2    progesterone 50 mg/mL injection, Inject 2 mL (100 mg) into the muscle once daily. Inject into the muscle at the same time each morning as directed per provider., Disp: , Rfl:     sertraline (Zoloft) 50 mg tablet, Take 1 tablet (50 mg) by mouth once daily., Disp: 90 tablet, Rfl: 3    Soolantra 1 % cream, 1 Application once daily. APPLY TOPICALLY TO FACE 1 TIME IN THE MORNING, Disp: , Rfl:     transparent dressings 2 3/8 X 2 3/4 \" bandage, Apply 1 each topically once daily. Use as directed to cover lidocaine cream., Disp: 30 each, Rfl: 3    PNV: Yes  Vitamin D 2000 IUs: Yes  Luteal support: Prometrium 200 mg PV BID, progesterone in ethyl oleate 100mg, estrace   Additional medications: levothyroxine 50mcg, setraline 50mg, prednisone 5mg, plaquenil     Labs ordered/Plan:   BhCG ordered. Team will reach out to patient with results and plan.   Discussed early pregnancy versus miscarriage versus ectopic. Precautions given.   Patient expresses understanding of plan and is agreeable.    Danielle Young  12/09/2024  7:20 AM      [facility] PROVIDER NOTE - HCG REVIEW  Ultrasound and/or labs reviewed at [facility].     Results for orders placed or performed in visit on 12/09/24 (from the past 96 hours)   hCG, quantitative, pregnancy   Result Value Ref Range    HCG, Beta-Quantitative 1,001 (H) <5 mIU/mL     *Note: Due to a large number of results and/or encounters for the requested time period, some results have not been displayed. A complete set of results can be found in Results Review.       Lab Results   Component Value Date    HCGQUANT 1,001 (H) 12/09/2024    HCGQUANT <3 09/09/2024    HCGQUANT <3 09/03/2024    HCGQUANT <2 01/15/2024    HCGQUANT <2 09/15/2023       RTC in ONE WEEK for HCG.   Deven DAVENPORT" Trish  12/09/2024  12:43 PM    Called patient with plan, left voicemail. Patient to call  to schedule for one week from now on 12/16. Order placed. Patient can come to [place] office if she would like to be scheduled here.   SATISH PRICE on 12/9/24 at 1:21 PM.

## 2024-12-11 ENCOUNTER — TELEPHONE (OUTPATIENT)
Dept: ENDOCRINOLOGY | Facility: CLINIC | Age: 44
End: 2024-12-11
Payer: COMMERCIAL

## 2024-12-11 ENCOUNTER — OFFICE VISIT (OUTPATIENT)
Dept: PRIMARY CARE | Facility: CLINIC | Age: 44
End: 2024-12-11
Payer: COMMERCIAL

## 2024-12-11 VITALS
OXYGEN SATURATION: 98 % | BODY MASS INDEX: 27.28 KG/M2 | SYSTOLIC BLOOD PRESSURE: 110 MMHG | HEART RATE: 86 BPM | WEIGHT: 159.8 LBS | RESPIRATION RATE: 16 BRPM | HEIGHT: 64 IN | TEMPERATURE: 98 F | DIASTOLIC BLOOD PRESSURE: 60 MMHG

## 2024-12-11 DIAGNOSIS — J32.4 CHRONIC PANSINUSITIS: ICD-10-CM

## 2024-12-11 DIAGNOSIS — E03.8 SUBCLINICAL HYPOTHYROIDISM: ICD-10-CM

## 2024-12-11 DIAGNOSIS — J06.9 UPPER RESPIRATORY TRACT INFECTION, UNSPECIFIED TYPE: Primary | ICD-10-CM

## 2024-12-11 DIAGNOSIS — N83.8 DIMINISHED OVARIAN RESERVE DUE TO ADVANCED MATERNAL AGE: ICD-10-CM

## 2024-12-11 PROCEDURE — 3008F BODY MASS INDEX DOCD: CPT | Performed by: INTERNAL MEDICINE

## 2024-12-11 PROCEDURE — 99214 OFFICE O/P EST MOD 30 MIN: CPT | Performed by: INTERNAL MEDICINE

## 2024-12-11 PROCEDURE — 1036F TOBACCO NON-USER: CPT | Performed by: INTERNAL MEDICINE

## 2024-12-11 ASSESSMENT — ENCOUNTER SYMPTOMS
ARTHRALGIAS: 0
HEADACHES: 0
CONSTIPATION: 0
WEAKNESS: 0
BACK PAIN: 0
AGITATION: 0
STRIDOR: 0
SEIZURES: 0
LIGHT-HEADEDNESS: 0
HEMATOLOGIC/LYMPHATIC NEGATIVE: 1
FLANK PAIN: 0
FREQUENCY: 0
NEUROLOGICAL NEGATIVE: 1
SPEECH DIFFICULTY: 0
CONSTITUTIONAL NEGATIVE: 1
EYE DISCHARGE: 0
EYE PAIN: 0
DYSURIA: 0
UNEXPECTED WEIGHT CHANGE: 0
MUSCULOSKELETAL NEGATIVE: 1
BLOOD IN STOOL: 0
ACTIVITY CHANGE: 0
PALPITATIONS: 0
APPETITE CHANGE: 0
EYES NEGATIVE: 1
POLYPHAGIA: 0
ABDOMINAL PAIN: 0
CARDIOVASCULAR NEGATIVE: 1
GASTROINTESTINAL NEGATIVE: 1
COLOR CHANGE: 0
ALLERGIC/IMMUNOLOGIC NEGATIVE: 1
NECK STIFFNESS: 0
ADENOPATHY: 0
DIARRHEA: 0
CONFUSION: 0
SINUS PRESSURE: 0
NERVOUS/ANXIOUS: 0
NUMBNESS: 0
TROUBLE SWALLOWING: 0
CHEST TIGHTNESS: 0
BRUISES/BLEEDS EASILY: 0
DIZZINESS: 0
TREMORS: 0
PSYCHIATRIC NEGATIVE: 1
WOUND: 0
ENDOCRINE NEGATIVE: 1
MYALGIAS: 0
NAUSEA: 0
COUGH: 1
POLYDIPSIA: 0
SORE THROAT: 0
SLEEP DISTURBANCE: 0
DIFFICULTY URINATING: 0
SINUS PAIN: 0
WHEEZING: 0
JOINT SWELLING: 0
NECK PAIN: 0
FACIAL ASYMMETRY: 0
HALLUCINATIONS: 0
EYE REDNESS: 0
VOICE CHANGE: 0
SHORTNESS OF BREATH: 0
VOMITING: 0

## 2024-12-11 ASSESSMENT — PATIENT HEALTH QUESTIONNAIRE - PHQ9
SUM OF ALL RESPONSES TO PHQ9 QUESTIONS 1 AND 2: 0
1. LITTLE INTEREST OR PLEASURE IN DOING THINGS: NOT AT ALL
2. FEELING DOWN, DEPRESSED OR HOPELESS: NOT AT ALL

## 2024-12-11 NOTE — PROGRESS NOTES
Subjective   Patient ID: Jocelyn Dee is a 44 y.o. female who presents for Cough (Pt is here due to a dry cough , pt stated she had a bit sore throat bodyaches and a bit of headache , and a little of nasal drip .  pt ).  Cough  Pertinent negatives include no chest pain, ear pain, eye redness, headaches, myalgias, rash, sore throat, shortness of breath or wheezing. There is no history of environmental allergies.     This is 43 yo patient of Dr. Addison with h/o Hypothyroidism, unspecified autoimmune disease, MBL deficiency, chronic pansinusitis, anxiety, HLD, IBS, IVF, currently 5 weeks pregnant.  Never smoked.    She is currently on multiple medications and her GYN is aware.    I reviewed patient's pertinent history from [facility] records.    She has been c/o mild nasal congestion and dry cough for more than 2 weeks, her symptoms have not been improving.  Denies fever, chills, sore throat. Denies SOB, has not elissa wheezing.  She has not been feeling well, but not sure if this is related to being pregnant, taking medications needed for her IVF pregnancy or cold symptoms.    Concerned about having pneumonia or bronchitis.  She has been monitoring her temperature at home and it has been within normal range, her pulse ox today is 98%.    Due to pregnancy I advised to be careful with taking any OTC cold/allergy medications, better use home remedies like hot tea with honey and lemon, chicken soup, cough drops, having humidifier on.  Advised to rest more, drink plenty of fluids.    Patient has been following with allergist regarding MBL deficiency. She will contact her allergist regarding her current symptoms.    Review of Systems   Constitutional: Negative.  Negative for activity change, appetite change and unexpected weight change.   HENT: Negative.  Negative for congestion, ear discharge, ear pain, hearing loss, mouth sores, nosebleeds, sinus pressure, sinus pain, sore throat, trouble swallowing and voice  "change.    Eyes: Negative.  Negative for pain, discharge, redness and visual disturbance.   Respiratory:  Positive for cough. Negative for chest tightness, shortness of breath, wheezing and stridor.    Cardiovascular: Negative.  Negative for chest pain, palpitations and leg swelling.   Gastrointestinal: Negative.  Negative for abdominal pain, blood in stool, constipation, diarrhea, nausea and vomiting.   Endocrine: Negative.  Negative for polydipsia, polyphagia and polyuria.   Genitourinary: Negative.  Negative for difficulty urinating, dysuria, flank pain, frequency and urgency.   Musculoskeletal: Negative.  Negative for arthralgias, back pain, gait problem, joint swelling, myalgias, neck pain and neck stiffness.   Skin: Negative.  Negative for color change, rash and wound.   Allergic/Immunologic: Negative.  Negative for environmental allergies, food allergies and immunocompromised state.   Neurological: Negative.  Negative for dizziness, tremors, seizures, syncope, facial asymmetry, speech difficulty, weakness, light-headedness, numbness and headaches.   Hematological: Negative.  Negative for adenopathy. Does not bruise/bleed easily.   Psychiatric/Behavioral: Negative.  Negative for agitation, behavioral problems, confusion, hallucinations, sleep disturbance and suicidal ideas. The patient is not nervous/anxious.    All other systems reviewed and are negative.      Objective     Review of systems was performed and all systems were negative except what in HPI    /60 (BP Location: Left arm, Patient Position: Sitting, BP Cuff Size: Adult)   Pulse 86   Temp 36.7 °C (98 °F) (Temporal)   Resp 16   Ht 1.626 m (5' 4\")   Wt 72.5 kg (159 lb 12.8 oz)   SpO2 98%   BMI 27.43 kg/m²    Physical Exam  Vitals and nursing note reviewed.   Constitutional:       General: She is not in acute distress.     Appearance: Normal appearance.   HENT:      Head: Normocephalic and atraumatic.      Right Ear: Tympanic membrane, ear " canal and external ear normal.      Left Ear: Tympanic membrane, ear canal and external ear normal.      Nose: Nose normal. No congestion or rhinorrhea.      Mouth/Throat:      Pharynx: Posterior oropharyngeal erythema and postnasal drip present.      Comments: Mild erythema of posterior pharynx noted with mild whitish postnasal drip  Eyes:      General:         Right eye: No discharge.         Left eye: No discharge.      Extraocular Movements: Extraocular movements intact.      Conjunctiva/sclera: Conjunctivae normal.      Pupils: Pupils are equal, round, and reactive to light.   Cardiovascular:      Rate and Rhythm: Normal rate and regular rhythm.      Pulses: Normal pulses.      Heart sounds: Normal heart sounds. No murmur heard.     No friction rub. No gallop.   Pulmonary:      Effort: Pulmonary effort is normal. No respiratory distress.      Breath sounds: Normal breath sounds. No stridor. No wheezing, rhonchi or rales.   Chest:      Chest wall: No tenderness.   Abdominal:      General: Bowel sounds are normal.      Palpations: Abdomen is soft. There is no mass.      Tenderness: There is no abdominal tenderness. There is no guarding or rebound.   Musculoskeletal:         General: No swelling or deformity. Normal range of motion.      Cervical back: Normal range of motion and neck supple. No rigidity or tenderness.      Right lower leg: No edema.      Left lower leg: No edema.   Lymphadenopathy:      Cervical: No cervical adenopathy.   Skin:     General: Skin is warm and dry.      Coloration: Skin is not jaundiced.      Findings: No bruising or erythema.   Neurological:      General: No focal deficit present.      Mental Status: She is alert and oriented to person, place, and time. Mental status is at baseline.      Cranial Nerves: No cranial nerve deficit.      Motor: No weakness.      Coordination: Coordination normal.      Gait: Gait normal.   Psychiatric:         Mood and Affect: Mood normal.          Behavior: Behavior normal.         Thought Content: Thought content normal.         Judgment: Judgment normal.         Assessment/Plan   Problem List Items Addressed This Visit             ICD-10-CM       ENT    Chronic pansinusitis J32.4     F/up with allergist.          Upper respiratory tract infection - Primary J06.9     Use home remedies like hot tea with honey and lemon, chicken soup, cough drops, have humidifier on.  Try to rest more, drink plenty of fluids.  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  I would like to remind you about importance of a healthy lifestyle in order to improve your well-being and live longer.  Try to engage in physical activities for at least 150 minutes per week.  Eat about 10 servings of fruits and vegetables daily. My advice is 2 servings of fruits and 8 servings of vegetables.  For vegetables choose at least half of them green and at least half of them fresh.  Please avoid sugar, salt, fried food and saturated fat.    I spent a total of 30 minutes on the date of service for follow up visit, which included preparing to see the patient, face-to-face patient care, completing clinical documentation, obtaining and/or reviewing separately obtained history, performing a medically appropriate examination, counseling and educating the patient/family/caregiver, ordering medications, tests, or procedures, communicating with other health care providers (not separately reported), independently interpreting results (not separately reported), communicating results to the patient/family/caregiver, and care coordination (not separately reported).    F/up with Dr. Addison as scheduled or sooner if needed.

## 2024-12-11 NOTE — TELEPHONE ENCOUNTER
Returned patient call, patient thinks she has a sinus infection or something upper respiratory, her throat is hurting as well as a deep cough. Advised pt she can take robitussin in pregnancy and try some throat lozenges. Advised pt to call her PCP or seek an urgent care to evaluate her symptoms and potentially get something ordered for her safe in pregnancy. Told her we do not typically evaluate and treat respiratory infections. Pt will call PCP, aware to avoid fever and take tylenol if she feels like she is getting a fever.  Catie Melchor 12/11/24 9:20 AM

## 2024-12-12 NOTE — ASSESSMENT & PLAN NOTE
>>ASSESSMENT AND PLAN FOR SUBCLINICAL HYPOTHYROIDISM WRITTEN ON 12/11/2024  9:19 PM BY LUAN BISHOP MD PHD    Continue Levothyroxine

## 2024-12-12 NOTE — ASSESSMENT & PLAN NOTE
Use home remedies like hot tea with honey and lemon, chicken soup, cough drops, have humidifier on.  Try to rest more, drink plenty of fluids.  F/up with your allergist is symptoms not improving

## 2024-12-13 ENCOUNTER — PHARMACY VISIT (OUTPATIENT)
Dept: PHARMACY | Facility: CLINIC | Age: 44
End: 2024-12-13
Payer: MEDICARE

## 2024-12-13 ENCOUNTER — TELEPHONE (OUTPATIENT)
Dept: PRIMARY CARE | Facility: CLINIC | Age: 44
End: 2024-12-13
Payer: COMMERCIAL

## 2024-12-13 NOTE — TELEPHONE ENCOUNTER
Pt of Dr Addison's. Saw Dr Naik for a cough and informed me she is pregnant. She was advised to ask her pharmacist what would be safe for her to take. He did not have any and stated we should have given her those recommendations. Pt is looking to come in and see Dr Addison for her cough if possible. Please advise. Thank you!

## 2024-12-16 ENCOUNTER — CLINICAL SUPPORT (OUTPATIENT)
Dept: ENDOCRINOLOGY | Facility: CLINIC | Age: 44
End: 2024-12-16
Payer: COMMERCIAL

## 2024-12-16 DIAGNOSIS — O36.80X0 ENCOUNTER TO DETERMINE FETAL VIABILITY OF PREGNANCY, NOT APPLICABLE OR UNSPECIFIED FETUS: ICD-10-CM

## 2024-12-16 DIAGNOSIS — Z32.00 ENCOUNTER FOR PREGNANCY TEST, RESULT UNKNOWN: ICD-10-CM

## 2024-12-16 LAB — B-HCG SERPL-ACNC: ABNORMAL MIU/ML

## 2024-12-16 PROCEDURE — 84702 CHORIONIC GONADOTROPIN TEST: CPT

## 2024-12-16 NOTE — PROGRESS NOTES
Initial HC,001  Patient's [facility] Provider: Jaymie Chong MD  Infertility dx: Diminished Ovarian Reserve  Achieved pregnancy by: Embryo transfer on   Fertility medications used this cycle:  patch protocol   LMP: Patient's last menstrual period was 24  EGA based on (IUI date, ET ): embryo transfer date 5W3D today on 24     Updated G/P with this pregnancy:         Type & Screen: 2024  ABO: A  Rh: POS  Antibody: NEG  History of miscarriage: No  History of pelvic/abdominal surgeries: No  History of STDs/PID: No  Hx of ectopic: No  Hx of tubal disease/ blockage: No     Risk for ectopic: No        Current medications:   PNV: Yes  Vitamin D 2000 IUs: Yes  Luteal support: Prometrium 200 mg PV BID, progesterone in ethyl oleate 100mg, estrace   Additional medications: levothyroxine 50mcg, setraline 50mg, prednisone 5mg, plaquenil       Patient here for repeat bhcg following FET on . Based off FET, patient would be about 5w3d today. Patient has been c/o cough and has been trying Robitussin DM with no relief- advised to switch to not DM. Patient stated she tested for covid and negative.     Will contact patient with results and plan.   Jennifer Christian 24 7:08 AM    Component      Latest Ref Rng 2024   HCG, Beta-Quantitative      <5 mIU/mL 1,001 (H)  10,943 (H)       [facility] PROVIDER NOTE - HCG REVIEW  Ultrasound and/or labs reviewed at [facility].     Results for orders placed or performed in visit on 24 (from the past 96 hours)   (Clinic Collect) Human Chorionic Gonadotropin, Serum Quantitative   Result Value Ref Range    HCG, Beta-Quantitative 10,943 (H) <5 mIU/mL     *Note: Due to a large number of results and/or encounters for the requested time period, some results have not been displayed. A complete set of results can be found in Results Review.       Lab Results   Component Value Date    HCGQUANT 10,943 (H) 2024    HCGQUANT 1,001 (H) 2024    HCGQUANT  <3 09/09/2024    HCGQUANT <3 09/03/2024    HCGQUANT <2 01/15/2024    HCGQUANT <2 09/15/2023       RTC in AT APPROX 6 weeks 5 DAYS GESTATION for OB scan and  no labs .   Joellen MCKENNA Kingston  12/16/2024  12:08 PM      Called patient with results and plan. Patient aware to schedule OB scan around 6w5d. Patient will switch to Robitussin non DM, and will try cough drops, and saline drops as needed. She is aware to follow up with PCP and to take tylenol if she develops any fever.   Transferred to  to schedule OB Scan.     Jennifer Christian 12/16/24 12:16 PM

## 2024-12-17 ENCOUNTER — APPOINTMENT (OUTPATIENT)
Dept: PRIMARY CARE | Facility: CLINIC | Age: 44
End: 2024-12-17
Payer: COMMERCIAL

## 2024-12-17 ENCOUNTER — TELEPHONE (OUTPATIENT)
Dept: ENDOCRINOLOGY | Facility: CLINIC | Age: 44
End: 2024-12-17

## 2024-12-17 VITALS
BODY MASS INDEX: 27.09 KG/M2 | DIASTOLIC BLOOD PRESSURE: 79 MMHG | TEMPERATURE: 97.6 F | OXYGEN SATURATION: 97 % | HEART RATE: 92 BPM | SYSTOLIC BLOOD PRESSURE: 126 MMHG | WEIGHT: 157.8 LBS

## 2024-12-17 DIAGNOSIS — J22 ACUTE RESPIRATORY INFECTION: ICD-10-CM

## 2024-12-17 DIAGNOSIS — J06.9 ACUTE URI: Primary | ICD-10-CM

## 2024-12-17 DIAGNOSIS — N97.9 FEMALE INFERTILITY: ICD-10-CM

## 2024-12-17 DIAGNOSIS — J06.9 VIRAL UPPER RESPIRATORY TRACT INFECTION: ICD-10-CM

## 2024-12-17 PROCEDURE — 87634 RSV DNA/RNA AMP PROBE: CPT

## 2024-12-17 PROCEDURE — 99213 OFFICE O/P EST LOW 20 MIN: CPT | Performed by: INTERNAL MEDICINE

## 2024-12-17 RX ORDER — PROGESTERONE 50 MG/ML
100 INJECTION, SOLUTION INTRAMUSCULAR DAILY
Qty: 50 ML | Refills: 3 | Status: SHIPPED | OUTPATIENT
Start: 2024-12-17 | End: 2025-12-17

## 2024-12-17 NOTE — TELEPHONE ENCOUNTER
Returned patient's call. Patient needs a refill of her Progesterone in Ethyl Oleate - she has about 3 days remaining. Will pend new order to be signed off by Provider today and will call [facility] to make sure they receive prescription.     Jennifer Christian 12/17/24 10:43 AM

## 2024-12-17 NOTE — PROGRESS NOTES
Subjective   Patient ID: Jocelyn Dee is a 44 y.o. female who presents for Cough (Patient is here for a deep dry cough that has been going on for a couple of weeks. ).    Patient seen because of URI for 2 weeks started with scratchy throat nasal congestion and persistent cough, she is 5 weeks pregnant, she saw Dr. Niak few days ago, has been on cough drops and humidifier.  Denies any productive cough fever or chills but she does have some postnasal drainage.         Review of Systems   HENT:          As HPI   Respiratory:          As HPI   Cardiovascular: Negative.    Gastrointestinal: Negative.    Genitourinary:         She is 5 weeks pregnant       Objective   /79 (BP Location: Left arm, Patient Position: Sitting, BP Cuff Size: Adult)   Pulse 92   Temp 36.4 °C (97.6 °F) (Temporal)   Wt 71.6 kg (157 lb 12.8 oz)   SpO2 97%   BMI 27.09 kg/m²     Physical Exam  Vitals reviewed.   Constitutional:       General: She is not in acute distress.     Appearance: Normal appearance.   HENT:      Head: Normocephalic and atraumatic.      Right Ear: Tympanic membrane, ear canal and external ear normal.      Left Ear: Tympanic membrane, ear canal and external ear normal.      Nose: Congestion present.      Comments: No sinus tenderness to palpation     Mouth/Throat:      Mouth: Mucous membranes are moist.      Pharynx: No oropharyngeal exudate or posterior oropharyngeal erythema.   Eyes:      General: No scleral icterus.     Extraocular Movements: Extraocular movements intact.      Conjunctiva/sclera: Conjunctivae normal.      Pupils: Pupils are equal, round, and reactive to light.   Neck:      Vascular: No carotid bruit.   Cardiovascular:      Rate and Rhythm: Normal rate and regular rhythm.      Pulses: Normal pulses.      Heart sounds: Normal heart sounds. No murmur heard.  Pulmonary:      Effort: Pulmonary effort is normal. No respiratory distress.      Breath sounds: Normal breath sounds. No wheezing, rhonchi  or rales.   Musculoskeletal:         General: Normal range of motion.      Cervical back: Normal range of motion and neck supple.   Lymphadenopathy:      Cervical: No cervical adenopathy.   Neurological:      General: No focal deficit present.      Mental Status: She is alert and oriented to person, place, and time.   Psychiatric:         Mood and Affect: Mood normal.         Assessment/Plan   Problem List Items Addressed This Visit             ICD-10-CM    Acute respiratory infection J22    Relevant Orders    RSV PCR (Completed)    Upper respiratory tract infection J06.9     Suspect viral URI   will check for RSV by PCR(even though she had her symptoms for 2 weeks) patient was having constant cough during exam, no wheezing.  Cough could be due to postnasal drainage.  Advised to use Flonase nasal spray if okay with her OB/GYN  Take Robitussin as needed for cough keep bedside humidifier.           Acute URI - Primary J06.9

## 2024-12-17 NOTE — TELEPHONE ENCOUNTER
Returned patient's call. Patient is unsure if she took her prometrium 200mg vaginally this morning and wondering if she can take an extra dose or just take tonights - she is almost positive she did take the medication this morning, but cannot be 100% sure. She is unable to determine how many prometrium left vs what she's used so far as she has multiple bottles from a remaining Rx.   Reviewed with NP, and patient will plan to just take tonights dose and call with any concerns.     Jennifer Christian 12/17/24 11:27 AM

## 2024-12-18 LAB — RSV RNA RESP QL NAA+PROBE: NOT DETECTED

## 2024-12-19 ASSESSMENT — ENCOUNTER SYMPTOMS
CARDIOVASCULAR NEGATIVE: 1
GASTROINTESTINAL NEGATIVE: 1

## 2024-12-19 NOTE — ASSESSMENT & PLAN NOTE
Suspect viral URI   will check for RSV by PCR(even though she had her symptoms for 2 weeks) patient was having constant cough during exam, no wheezing.  Cough could be due to postnasal drainage.  Advised to use Flonase nasal spray if okay with her OB/GYN  Take Robitussin as needed for cough keep bedside humidifier.

## 2024-12-23 DIAGNOSIS — E03.9 HYPOTHYROIDISM, UNSPECIFIED TYPE: Primary | ICD-10-CM

## 2024-12-23 NOTE — PROGRESS NOTES
Visit Type: In Person    OB Scan    Ectopic risk factor: no  PGTA/PGTM: no  Blood type: A  Method of Conception: Frozen Embryo transfer  with donor egg on 11-  Meds: PNV daily, Vivelle 0.3 mg to change every other day, Prometrium 200 mg PV BID, ROBB in ethyl oleate 100 mg IM daily, levothyroxine 50 mcg daily, prednisone 5 mg, plaquenil        Reviewed results from OB ultrasound today and results reviewed with pt as follows:     OB Scan results:   Conception 5 day FET with donor egg on 11/27/2024: 6 w + 6 d, JOSEPH 8/15/2025  Stated JOSEPH 6 w + 6 d, JOSEPH 8/15/2025  U/S 12/26/2024 based upon CRL 6 w + 1 d, JOSEPH 8/20/2025  Impression Early single intrauterine pregnancy with cardiac activity present, however slow cardiac activity  noted. Size is smaller than expected based on IVF dating. Indeterminate viability.  Follow-up Follow up in 1 week  Assessment Gestational sac: visualized. Location: intrauterine  Yolk sac: not visualized  Embryo: uncertain  Cardiac activity: uncertain  Early placenta: circumferential  GS 12.7 mm   CRL 2.6 mm 6w 1d   FHR 66 bpm  Measuring smaller than FET dates with low FHR    Detailed discussion with patient about her scan results, pregnancy, and next steps:    Plan:   Discussed low FHR- likely non-viable IUP, irregular shaped GS.  Reviewed medications in detail. She will continue PNV, Vivelle, Prometrium, progesterone in ethyl oleate, levothyroxine, prednisone, & plaquenil.   Reviewed supplemental progesterone, route and dose, reviewed dates of cessation. Last day to take Vivelle, Prometrium, Ethyl Oleate ROBB, & Prednisone is on 1-.  Discussed with patient any pregnancy concerns and discussed establishing care with an  OB.  Will provide recommendations if she desires.   Advised to call with bleeding, pain or concerns. Went to ED 12-: c/o spotting and cramping- ultrasound performed with JOE and fetal pole and possible FHR  TSH Level- added to labs done 12-- ^ at 3.74/T4  normal at 0.90- will increase Levothyroxine to 75 mcg daily and repeat TSH 4-6 weeks  Repeat OB Scan 1 week- discussed threatened miscarriage    Ultrasound results and Plan of care reviewed with Dr. Castillo Escobedo, ALHAJI 12/26/24 5:42 PM

## 2024-12-24 ENCOUNTER — HOSPITAL ENCOUNTER (EMERGENCY)
Facility: HOSPITAL | Age: 44
Discharge: HOME | End: 2024-12-25
Attending: STUDENT IN AN ORGANIZED HEALTH CARE EDUCATION/TRAINING PROGRAM
Payer: COMMERCIAL

## 2024-12-24 DIAGNOSIS — O20.9 FIRST TRIMESTER BLEEDING (HHS-HCC): Primary | ICD-10-CM

## 2024-12-24 DIAGNOSIS — E03.9 HYPOTHYROIDISM, UNSPECIFIED TYPE: ICD-10-CM

## 2024-12-24 LAB
ALBUMIN SERPL BCP-MCNC: 4 G/DL (ref 3.4–5)
ALP SERPL-CCNC: 64 U/L (ref 33–110)
ALT SERPL W P-5'-P-CCNC: 19 U/L (ref 7–45)
ANION GAP SERPL CALC-SCNC: 14 MMOL/L (ref 10–20)
AST SERPL W P-5'-P-CCNC: 22 U/L (ref 9–39)
BASOPHILS # BLD AUTO: 0.07 X10*3/UL (ref 0–0.1)
BASOPHILS NFR BLD AUTO: 0.5 %
BILIRUB SERPL-MCNC: 0.2 MG/DL (ref 0–1.2)
BUN SERPL-MCNC: 14 MG/DL (ref 6–23)
CALCIUM SERPL-MCNC: 9.9 MG/DL (ref 8.6–10.3)
CHLORIDE SERPL-SCNC: 102 MMOL/L (ref 98–107)
CO2 SERPL-SCNC: 23 MMOL/L (ref 21–32)
CREAT SERPL-MCNC: 0.57 MG/DL (ref 0.5–1.05)
EGFRCR SERPLBLD CKD-EPI 2021: >90 ML/MIN/1.73M*2
EOSINOPHIL # BLD AUTO: 0.39 X10*3/UL (ref 0–0.7)
EOSINOPHIL NFR BLD AUTO: 2.5 %
ERYTHROCYTE [DISTWIDTH] IN BLOOD BY AUTOMATED COUNT: 11.9 % (ref 11.5–14.5)
GLUCOSE SERPL-MCNC: 97 MG/DL (ref 74–99)
HCT VFR BLD AUTO: 37.6 % (ref 36–46)
HGB BLD-MCNC: 12.9 G/DL (ref 12–16)
IMM GRANULOCYTES # BLD AUTO: 0.08 X10*3/UL (ref 0–0.7)
IMM GRANULOCYTES NFR BLD AUTO: 0.5 % (ref 0–0.9)
LYMPHOCYTES # BLD AUTO: 3.63 X10*3/UL (ref 1.2–4.8)
LYMPHOCYTES NFR BLD AUTO: 23.6 %
MCH RBC QN AUTO: 30.5 PG (ref 26–34)
MCHC RBC AUTO-ENTMCNC: 34.3 G/DL (ref 32–36)
MCV RBC AUTO: 89 FL (ref 80–100)
MONOCYTES # BLD AUTO: 1.05 X10*3/UL (ref 0.1–1)
MONOCYTES NFR BLD AUTO: 6.8 %
NEUTROPHILS # BLD AUTO: 10.15 X10*3/UL (ref 1.2–7.7)
NEUTROPHILS NFR BLD AUTO: 66.1 %
NRBC BLD-RTO: 0 /100 WBCS (ref 0–0)
PLATELET # BLD AUTO: 480 X10*3/UL (ref 150–450)
POTASSIUM SERPL-SCNC: 3.8 MMOL/L (ref 3.5–5.3)
PROT SERPL-MCNC: 7.3 G/DL (ref 6.4–8.2)
RBC # BLD AUTO: 4.23 X10*6/UL (ref 4–5.2)
SODIUM SERPL-SCNC: 135 MMOL/L (ref 136–145)
WBC # BLD AUTO: 15.4 X10*3/UL (ref 4.4–11.3)

## 2024-12-24 PROCEDURE — 80053 COMPREHEN METABOLIC PANEL: CPT | Performed by: STUDENT IN AN ORGANIZED HEALTH CARE EDUCATION/TRAINING PROGRAM

## 2024-12-24 PROCEDURE — 84443 ASSAY THYROID STIM HORMONE: CPT

## 2024-12-24 PROCEDURE — 2500000004 HC RX 250 GENERAL PHARMACY W/ HCPCS (ALT 636 FOR OP/ED): Performed by: STUDENT IN AN ORGANIZED HEALTH CARE EDUCATION/TRAINING PROGRAM

## 2024-12-24 PROCEDURE — 84439 ASSAY OF FREE THYROXINE: CPT

## 2024-12-24 PROCEDURE — 99285 EMERGENCY DEPT VISIT HI MDM: CPT | Performed by: STUDENT IN AN ORGANIZED HEALTH CARE EDUCATION/TRAINING PROGRAM

## 2024-12-24 PROCEDURE — 86850 RBC ANTIBODY SCREEN: CPT | Performed by: STUDENT IN AN ORGANIZED HEALTH CARE EDUCATION/TRAINING PROGRAM

## 2024-12-24 PROCEDURE — 85025 COMPLETE CBC W/AUTO DIFF WBC: CPT | Performed by: STUDENT IN AN ORGANIZED HEALTH CARE EDUCATION/TRAINING PROGRAM

## 2024-12-24 PROCEDURE — 96361 HYDRATE IV INFUSION ADD-ON: CPT

## 2024-12-24 PROCEDURE — 36415 COLL VENOUS BLD VENIPUNCTURE: CPT | Performed by: STUDENT IN AN ORGANIZED HEALTH CARE EDUCATION/TRAINING PROGRAM

## 2024-12-24 PROCEDURE — 84702 CHORIONIC GONADOTROPIN TEST: CPT | Performed by: STUDENT IN AN ORGANIZED HEALTH CARE EDUCATION/TRAINING PROGRAM

## 2024-12-24 PROCEDURE — 99284 EMERGENCY DEPT VISIT MOD MDM: CPT | Performed by: STUDENT IN AN ORGANIZED HEALTH CARE EDUCATION/TRAINING PROGRAM

## 2024-12-24 PROCEDURE — 96360 HYDRATION IV INFUSION INIT: CPT

## 2024-12-24 RX ADMIN — SODIUM CHLORIDE 1000 ML: 9 INJECTION, SOLUTION INTRAVENOUS at 23:26

## 2024-12-24 ASSESSMENT — PAIN DESCRIPTION - LOCATION: LOCATION: BACK

## 2024-12-24 ASSESSMENT — PAIN DESCRIPTION - ORIENTATION: ORIENTATION: LOWER

## 2024-12-24 ASSESSMENT — PAIN DESCRIPTION - PAIN TYPE: TYPE: CHRONIC PAIN

## 2024-12-24 ASSESSMENT — PAIN - FUNCTIONAL ASSESSMENT: PAIN_FUNCTIONAL_ASSESSMENT: 0-10

## 2024-12-24 ASSESSMENT — COLUMBIA-SUICIDE SEVERITY RATING SCALE - C-SSRS
6. HAVE YOU EVER DONE ANYTHING, STARTED TO DO ANYTHING, OR PREPARED TO DO ANYTHING TO END YOUR LIFE?: NO
1. IN THE PAST MONTH, HAVE YOU WISHED YOU WERE DEAD OR WISHED YOU COULD GO TO SLEEP AND NOT WAKE UP?: NO
2. HAVE YOU ACTUALLY HAD ANY THOUGHTS OF KILLING YOURSELF?: NO

## 2024-12-24 ASSESSMENT — PAIN SCALES - GENERAL
PAINLEVEL_OUTOF10: 0 - NO PAIN
PAINLEVEL_OUTOF10: 2

## 2024-12-25 VITALS
TEMPERATURE: 98.2 F | OXYGEN SATURATION: 99 % | WEIGHT: 153 LBS | BODY MASS INDEX: 26.12 KG/M2 | HEIGHT: 64 IN | DIASTOLIC BLOOD PRESSURE: 65 MMHG | SYSTOLIC BLOOD PRESSURE: 122 MMHG | RESPIRATION RATE: 17 BRPM | HEART RATE: 85 BPM

## 2024-12-25 LAB
ABO GROUP (TYPE) IN BLOOD: NORMAL
ANTIBODY SCREEN: NORMAL
APPEARANCE UR: CLEAR
B-HCG SERPL-ACNC: ABNORMAL MIU/ML
BILIRUB UR STRIP.AUTO-MCNC: NEGATIVE MG/DL
COLOR UR: COLORLESS
GLUCOSE UR STRIP.AUTO-MCNC: NORMAL MG/DL
HOLD SPECIMEN: NORMAL
KETONES UR STRIP.AUTO-MCNC: NEGATIVE MG/DL
LEUKOCYTE ESTERASE UR QL STRIP.AUTO: NEGATIVE
NITRITE UR QL STRIP.AUTO: NEGATIVE
PH UR STRIP.AUTO: 6 [PH]
PROT UR STRIP.AUTO-MCNC: NEGATIVE MG/DL
RBC # UR STRIP.AUTO: ABNORMAL /UL
RBC #/AREA URNS AUTO: ABNORMAL /HPF
RH FACTOR (ANTIGEN D): NORMAL
SP GR UR STRIP.AUTO: 1.01
UROBILINOGEN UR STRIP.AUTO-MCNC: NORMAL MG/DL
WBC #/AREA URNS AUTO: ABNORMAL /HPF

## 2024-12-25 PROCEDURE — 76815 OB US LIMITED FETUS(S): CPT | Performed by: STUDENT IN AN ORGANIZED HEALTH CARE EDUCATION/TRAINING PROGRAM

## 2024-12-25 PROCEDURE — 81001 URINALYSIS AUTO W/SCOPE: CPT | Performed by: STUDENT IN AN ORGANIZED HEALTH CARE EDUCATION/TRAINING PROGRAM

## 2024-12-25 ASSESSMENT — PAIN - FUNCTIONAL ASSESSMENT: PAIN_FUNCTIONAL_ASSESSMENT: 0-10

## 2024-12-25 ASSESSMENT — PAIN SCALES - GENERAL: PAINLEVEL_OUTOF10: 0 - NO PAIN

## 2024-12-25 NOTE — ED PROVIDER NOTES
HPI   Chief Complaint   Patient presents with    Vaginal Bleeding - Pregnant     Pt presents to the ED six weeks and four days pregnant, pt is using IVF - pt states bleeding (spotting) started approx. 30 minutes ago. Pt denies any abdominal cramping currently.        HPI  44-year-old female approximately 6 weeks pregnant on IVF, highly reviewed concern regarding spotting that started approximate 30 minutes prior to arrival with mild abdominal cramping.  She reports it is difficult to say if she is having abdominal pain as she has had significant pain from spinal issues chronically throughout this pregnancy.  Reviewed last OB/GYN note  embryo transfer  with LMP , blood type A positive (does not need Rhogam), on Prometrium, progesterone, estrace.  Beta-hCG  was 10,943.  She and her  at bedside are extremely motivated to continue with this pregnancy.  She indicates that she did take the intravaginal progesterone this evening as prescribed, otherwise nothing has been in her vagina.  Reports urinary urge/incontinence issues when she is cough to the last several weeks, she would like to know if she has a UTI.  Denies fevers, chest pain, shortness of breath.      Patient History   Past Medical History:   Diagnosis Date    Papillomavirus as the cause of diseases classified elsewhere     HPV in female    Personal history of other complications of pregnancy, childbirth and the puerperium     History of spontaneous     Personal history of other diseases of the female genital tract     History of infertility    Personal history of other infectious and parasitic diseases     History of infection due to human papilloma virus (HPV)     Past Surgical History:   Procedure Laterality Date    OTHER SURGICAL HISTORY  2022    Tonsillectomy    OTHER SURGICAL HISTORY  2022    Cervical surgery     Family History   Problem Relation Name Age of Onset    Breast cancer Mother      Diabetes Other       Drug abuse Other      Heart disease Other       Social History     Tobacco Use    Smoking status: Never    Smokeless tobacco: Never   Vaping Use    Vaping status: Never Used   Substance Use Topics    Alcohol use: Yes    Drug use: Never       Physical Exam   ED Triage Vitals [12/24/24 2250]   Temperature Heart Rate Respirations BP   36.8 °C (98.2 °F) 88 18 124/80      Pulse Ox Temp Source Heart Rate Source Patient Position   97 % Temporal Monitor Sitting      BP Location FiO2 (%)     Right arm --       Physical Exam  Vitals and nursing note reviewed.   Constitutional:       General: She is not in acute distress.     Appearance: She is well-developed.   HENT:      Head: Normocephalic and atraumatic.      Right Ear: Tympanic membrane normal.      Left Ear: Tympanic membrane normal.   Eyes:      Conjunctiva/sclera: Conjunctivae normal.   Cardiovascular:      Rate and Rhythm: Normal rate and regular rhythm.      Pulses: Normal pulses.      Heart sounds: Normal heart sounds.   Pulmonary:      Effort: Pulmonary effort is normal. No respiratory distress.      Breath sounds: Normal breath sounds.   Abdominal:      Palpations: Abdomen is soft.      Tenderness: There is abdominal tenderness (Very mild suprapubic, no focality in bilateral lower quadrants).   Genitourinary:     Comments: Deferred  Musculoskeletal:         General: No swelling.      Cervical back: Neck supple.   Skin:     General: Skin is warm and dry.      Capillary Refill: Capillary refill takes less than 2 seconds.   Neurological:      Mental Status: She is alert.   Psychiatric:         Mood and Affect: Mood normal.       ED Course & MDM   ED Course as of 12/25/24 0645   Tue Dec 24, 2024   2321 POCUS performed, patient had just urinated so was somewhat limited. I do think I see a fetal pole with a flicker of heart beat. We will fill up patient's bladder and look again. []   Wed Dec 25, 2024   0115 HCG, Beta-Quantitative(!): 24,422  Very reassuring []       ED Course User Index  [JH] Juan Hawkins MD         Diagnoses as of 12/25/24 0645   First trimester bleeding ([facility])           No data recorded     Carlos Coma Scale Score: 15 (12/24/24 2253 : Alethea Webster, NEGAR)                   Medical Decision Making  44-year-old female who presents with vaginal spotting, hemoglobin stable and afebrile.  Giving NS bolus 1 L, sending for urine.  Urine is noninfectious. Patient's blood type confirmed a positive, does not need RhoGAM. Deferred pelvic exam with patient only reporting mild spotting, want to promote pelvic rest.  H&H within normal limits, WBC 15.4, creatinine within normal limits.  hCG very reassuringly doubling, now at 24,000.  POCUS performed x 2, and after bladder filled with fluids, do see an obvious fetal pole with likely heart rate, difficulty capturing with M-mode.  I do believe patient has a small subchorionic hemorrhage adjacent to this gestational sac which I discussed with patient regarding prognostication in the setting of first trimester pregnancy.  Patient expressed understanding, she has close follow-up planned for Thursday this week, we discussed return precautions including increasing bleeding, increasing pain.  Tolerating p.o., appropriate for discharge.    CI:  1. First trimester bleeding ([facility])        Dispo:  Discharge    Procedure  Procedures     Juan Hawkins MD  12/25/24 0658

## 2024-12-25 NOTE — ED PROCEDURE NOTE
Procedure    Performed by: Juan Hawkins MD  Authorized by: Juan Hawkins MD        Genitourinary Indications: evaluation for fetal cardiac activity, pelvic pain and non-obstetric vaginal bleeding          Procedure: Pelvic Ultrasound    Exam: transabdominal exam  Findings:  IUP: The pelvis was visualized and there was an INTRAUTERINE PREGNANCY visualized (a yolk sac, fetal pole or fetus was seen).  Fetal heart rate (bpm): Grossly > 110, but difficulty with M-mode to measure definitively.  Pelvic Free Fluid: The pelvis was visualized and was NEGATIVE for free fluid.    Impression:  Pelvis: The focused pelvic ultrasound showed an INTRAUTERINE PREGNANCY.    Comments: I performed POCUS both before and after patient had fluid in the bladder.  Images post full bladder were much improved, and definitively did see a fetal pole.  I tried multiple times to push these additional images into PACS, but they did not seem to push.  Images likely sufficient better currently present with and showing no significant free fluid, gestational sac with fetal pole               Juan Hawkins MD  12/25/24 2601

## 2024-12-25 NOTE — DISCHARGE INSTRUCTIONS
We did an ultrasound today at beside, and it does appear that you have a small fetal pole and we can visualize a flicker consistent with a heart rate. Grossly, I see a possible small subchorionic bleed that is the likely cause of your bleeding. If your bleeding/pain increases, (bleeding through >1 pad an hour for >2 hours, please return to the ED for re-evaluation).    Your urine is clean, you likely are having urinary issues with your increasing in size uterus pushing on your bladder.    Please go to your appointment on Thursday.

## 2024-12-26 ENCOUNTER — OFFICE VISIT (OUTPATIENT)
Dept: ENDOCRINOLOGY | Facility: CLINIC | Age: 44
End: 2024-12-26
Payer: COMMERCIAL

## 2024-12-26 ENCOUNTER — ANCILLARY PROCEDURE (OUTPATIENT)
Dept: ENDOCRINOLOGY | Facility: CLINIC | Age: 44
End: 2024-12-26
Payer: COMMERCIAL

## 2024-12-26 DIAGNOSIS — O36.80X0 ENCOUNTER TO DETERMINE FETAL VIABILITY OF PREGNANCY, NOT APPLICABLE OR UNSPECIFIED FETUS: Primary | ICD-10-CM

## 2024-12-26 DIAGNOSIS — E03.9 HYPOTHYROIDISM, UNSPECIFIED TYPE: Primary | ICD-10-CM

## 2024-12-26 DIAGNOSIS — O36.80X0 ENCOUNTER TO DETERMINE FETAL VIABILITY OF PREGNANCY, NOT APPLICABLE OR UNSPECIFIED FETUS: ICD-10-CM

## 2024-12-26 LAB
T4 FREE SERPL-MCNC: 0.9 NG/DL (ref 0.61–1.12)
TSH SERPL-ACNC: 3.74 MIU/L (ref 0.44–3.98)

## 2024-12-26 PROCEDURE — 76817 TRANSVAGINAL US OBSTETRIC: CPT

## 2024-12-26 PROCEDURE — 76817 TRANSVAGINAL US OBSTETRIC: CPT | Performed by: OBSTETRICS & GYNECOLOGY

## 2024-12-26 PROCEDURE — 99212 OFFICE O/P EST SF 10 MIN: CPT | Mod: 25

## 2024-12-27 ENCOUNTER — TELEPHONE (OUTPATIENT)
Dept: ENDOCRINOLOGY | Facility: CLINIC | Age: 44
End: 2024-12-27
Payer: COMMERCIAL

## 2024-12-27 DIAGNOSIS — E03.9 HYPOTHYROIDISM, UNSPECIFIED TYPE: Primary | ICD-10-CM

## 2024-12-27 RX ORDER — LEVOTHYROXINE SODIUM 75 UG/1
75 TABLET ORAL DAILY
Qty: 30 TABLET | Refills: 1 | Status: SHIPPED | OUTPATIENT
Start: 2024-12-27 | End: 2025-12-27

## 2024-12-27 NOTE — TELEPHONE ENCOUNTER
Returned patient's call. Patient was seen yesterday for OB scan and it was mentioned for possible threatened miscarriage. Patient understandably upset and would prefer to repeat OB Scan on Monday before the new year. Reviewed with Perla Escobedo, and not recommended to repeat before a week, but she can schedule for Monday 12/30, and plan to most likely repeat since it is only 4 days post.   Patient transferred to  to schedule.     Jennifer Christian 12/27/24 2:20 PM

## 2024-12-27 NOTE — TELEPHONE ENCOUNTER
Reason for call: Call Back  Notes: Pt would like to know if it's possible to repeat her OB scan on Monday 12/30. She stated she would prefer to have it done before the new year and is unable to come in Thursday afternoon.

## 2024-12-27 NOTE — TELEPHONE ENCOUNTER
Reason for call: Call Back  Notes: Pt is planning on traveling a couple of hours away this weekend and wanted to verify if she's still able to do so or if it is ill-advised.

## 2024-12-27 NOTE — TELEPHONE ENCOUNTER
Returned patient's call. Patient is planning on driving just a few hours away this week, and confirmed she is traveling somewhere with access to medical care and hospitals if needed. IUP was confirmed on OB scan, but lower FHR, and will come in for repeat next week.   Patient to call with any concerns with bleeding or pain.   Jennifer Christian 12/27/24 2:25 PM

## 2024-12-30 ENCOUNTER — ANCILLARY PROCEDURE (OUTPATIENT)
Dept: ENDOCRINOLOGY | Facility: CLINIC | Age: 44
End: 2024-12-30
Payer: COMMERCIAL

## 2024-12-30 ENCOUNTER — TELEPHONE (OUTPATIENT)
Dept: ENDOCRINOLOGY | Facility: CLINIC | Age: 44
End: 2024-12-30

## 2024-12-30 ENCOUNTER — OFFICE VISIT (OUTPATIENT)
Dept: ENDOCRINOLOGY | Facility: CLINIC | Age: 44
End: 2024-12-30
Payer: COMMERCIAL

## 2024-12-30 DIAGNOSIS — O36.80X0 ENCOUNTER TO DETERMINE FETAL VIABILITY OF PREGNANCY, NOT APPLICABLE OR UNSPECIFIED FETUS: ICD-10-CM

## 2024-12-30 DIAGNOSIS — Z01.812 ENCOUNTER FOR PREPROCEDURAL LABORATORY EXAMINATION: ICD-10-CM

## 2024-12-30 DIAGNOSIS — N97.9 FEMALE INFERTILITY: ICD-10-CM

## 2024-12-30 DIAGNOSIS — O02.1 MISSED ABORTION (HHS-HCC): Primary | ICD-10-CM

## 2024-12-30 PROCEDURE — 76817 TRANSVAGINAL US OBSTETRIC: CPT

## 2024-12-30 PROCEDURE — 99212 OFFICE O/P EST SF 10 MIN: CPT | Mod: 25

## 2024-12-30 PROCEDURE — 99212 OFFICE O/P EST SF 10 MIN: CPT

## 2024-12-30 NOTE — PROGRESS NOTES
Visit Type: In Person    Repeat OB Scan for low FHR     Ectopic risk factor: no  PGTA/PGTM: no  Blood type: A  Method of Conception: Frozen Embryo transfer  with donor egg on 11-  Meds: PNV daily, Vivelle 0.3 mg to change every other day, Prometrium 200 mg PV BID, ROBB in ethyl oleate 100 mg IM daily, levothyroxine 75 mcg daily- dose increased 12- for ^ TSH of 3.74/normal T4 of 0.90, prednisone 5 mg, plaquenil     Reviewed results from OB ultrasound today and results reviewed with pt as follows:     OB Scan results:   Dating Date Details Gest. age JOSEPH  Conception via 5 day FET with donor egg on 11/27/2024: 7 w + 3 d, JOSEPH 8/15/2025  Stated JOSEPH 7 w + 3 d, JOSEPH 8/15/2025  U/S 12/30/2024 based upon CRL, GS 6 w + 1 d, JOSEPH 8/24/2025  Assessment Gestational sac: visualized. Location: intrauterine  Yolk sac: visualized  Embryo: visualized  Cardiac activity: absent  Early placenta: circumferential  GS 15.6 mm 6w 1d   CRL 2.7 mm 6w 1d    Detailed discussion with patient about her scan results, pregnancy, and next steps:    Plan:   Discussed no FHR seen today, diagnosed as miscarriage  Discussed options- rec IPAS with or without anesthesia  IPAS with anesthesia scheduled for 1-2-2025 at 0915, arrive at 0800, NPO after midnight, needs ride home  Reviewed medications in detail. She will continue PNV, Vivelle, Prometrium, progesterone in ethyl oleate, levothyroxine, prednisone, & plaquenil- stop FET medications after IPAS.   Advised to call with bleeding, pain or concerns. Went to ED 12-: c/o spotting and cramping, nothing since.   repeat TSH 4-6 weeks      Ultrasound results and Plan of care reviewed with MD Perla Germain, ALHAJI 12/30/24 6:19 PM   
Yes

## 2024-12-30 NOTE — TELEPHONE ENCOUNTER
Spoke to the patient in ultrasound room she confirmed her name and date of birth I advised the iaps will be on 1-2-25 at 0915 and arrival is 0800 I advised nothing to eat or drink after midnight on 1-1-25 and that she will need a  to bring her and take her home Patient verbalized an understanding    GIA BECERRA 12/30/24 3:02 PM

## 2024-12-30 NOTE — Clinical Note
Jocelyn Kumar Dr. diagnosed with miscarriage today and will have an IPAS 1-2-2025, conceived with donor egg FET, Perla

## 2024-12-31 ENCOUNTER — ANESTHESIA EVENT (OUTPATIENT)
Dept: ENDOCRINOLOGY | Facility: CLINIC | Age: 44
End: 2024-12-31
Payer: COMMERCIAL

## 2024-12-31 NOTE — ANESTHESIA PREPROCEDURE EVALUATION
Patient: Jocelyn Dee    Evaluation Method: In-person visit    Procedure Information       Date/Time: 01/02/25 0951    Scheduled providers: Aye Teresa MD    Procedure: MANUAL VACUUM ASPIRATION    Location: [facility]; [facility]            Relevant Problems   Cardiac   (+) Hyperlipidemia      Neuro   (+) Anxiety with flying   (+) Cervical radiculopathy, acute   (+) Lumbosacral radiculitis      GI   (+) Irritable bowel syndrome with both constipation and diarrhea      Endocrine   (+) Subclinical hypothyroidism      Musculoskeletal   (+) Cervical spondylosis without myelopathy   (+) Lumbosacral spondylosis without myelopathy      HEENT   (+) Chronic pansinusitis      ID   (+) Acute URI   (+) Acute respiratory infection   (+) Chronic infection   (+) Human papilloma virus (HPV) infection   (+) Upper respiratory tract infection       Clinical information reviewed:                   NPO Detail:  No data recorded     OB/GYN     Physical Exam    Airway  Mallampati: II     Cardiovascular   Rhythm: regular  Rate: normal     Dental    Pulmonary    Abdominal            Anesthesia Plan    History of general anesthesia?: yes  History of complications of general anesthesia?: no    ASA 2     MAC     Anesthetic plan and risks discussed with patient.    Plan discussed with CRNA and CAA.

## 2025-01-02 ENCOUNTER — ANCILLARY PROCEDURE (OUTPATIENT)
Dept: ENDOCRINOLOGY | Facility: CLINIC | Age: 45
End: 2025-01-02
Payer: COMMERCIAL

## 2025-01-02 ENCOUNTER — PREP FOR PROCEDURE (OUTPATIENT)
Dept: ENDOCRINOLOGY | Facility: CLINIC | Age: 45
End: 2025-01-02

## 2025-01-02 ENCOUNTER — HOSPITAL ENCOUNTER (OUTPATIENT)
Dept: ENDOCRINOLOGY | Facility: CLINIC | Age: 45
Discharge: HOME | End: 2025-01-02
Payer: COMMERCIAL

## 2025-01-02 ENCOUNTER — ANESTHESIA (OUTPATIENT)
Dept: ENDOCRINOLOGY | Facility: CLINIC | Age: 45
End: 2025-01-02
Payer: COMMERCIAL

## 2025-01-02 VITALS
BODY MASS INDEX: 26.84 KG/M2 | HEART RATE: 72 BPM | HEIGHT: 64 IN | DIASTOLIC BLOOD PRESSURE: 59 MMHG | SYSTOLIC BLOOD PRESSURE: 109 MMHG | OXYGEN SATURATION: 96 % | TEMPERATURE: 97.7 F | RESPIRATION RATE: 15 BRPM | WEIGHT: 157.19 LBS

## 2025-01-02 DIAGNOSIS — N97.9 FEMALE INFERTILITY: ICD-10-CM

## 2025-01-02 DIAGNOSIS — Z01.812 ENCOUNTER FOR PREPROCEDURAL LABORATORY EXAMINATION: ICD-10-CM

## 2025-01-02 DIAGNOSIS — O02.1 MISSED ABORTION (HHS-HCC): ICD-10-CM

## 2025-01-02 LAB
ABO GROUP (TYPE) IN BLOOD: NORMAL
ALBUMIN SERPL BCP-MCNC: 3.8 G/DL (ref 3.4–5)
ALP SERPL-CCNC: 56 U/L (ref 33–110)
ALT SERPL W P-5'-P-CCNC: 20 U/L (ref 7–45)
ANION GAP SERPL CALC-SCNC: 10 MMOL/L (ref 10–20)
ANTIBODY SCREEN: NORMAL
AST SERPL W P-5'-P-CCNC: 22 U/L (ref 9–39)
B-HCG SERPL-ACNC: ABNORMAL MIU/ML
BILIRUB SERPL-MCNC: 0.3 MG/DL (ref 0–1.2)
BUN SERPL-MCNC: 9 MG/DL (ref 6–23)
CALCIUM SERPL-MCNC: 9.1 MG/DL (ref 8.6–10.3)
CHLORIDE SERPL-SCNC: 107 MMOL/L (ref 98–107)
CO2 SERPL-SCNC: 24 MMOL/L (ref 21–32)
CREAT SERPL-MCNC: 0.59 MG/DL (ref 0.5–1.05)
EGFRCR SERPLBLD CKD-EPI 2021: >90 ML/MIN/1.73M*2
ERYTHROCYTE [DISTWIDTH] IN BLOOD BY AUTOMATED COUNT: 12.6 % (ref 11.5–14.5)
GLUCOSE SERPL-MCNC: 88 MG/DL (ref 74–99)
HCT VFR BLD AUTO: 38.7 % (ref 36–46)
HGB BLD-MCNC: 13.1 G/DL (ref 12–16)
MCH RBC QN AUTO: 30.4 PG (ref 26–34)
MCHC RBC AUTO-ENTMCNC: 33.9 G/DL (ref 32–36)
MCV RBC AUTO: 90 FL (ref 80–100)
NRBC BLD-RTO: 0 /100 WBCS (ref 0–0)
PLATELET # BLD AUTO: 422 X10*3/UL (ref 150–450)
POTASSIUM SERPL-SCNC: 4.3 MMOL/L (ref 3.5–5.3)
PROT SERPL-MCNC: 6.5 G/DL (ref 6.4–8.2)
RBC # BLD AUTO: 4.31 X10*6/UL (ref 4–5.2)
RH FACTOR (ANTIGEN D): NORMAL
SODIUM SERPL-SCNC: 137 MMOL/L (ref 136–145)
WBC # BLD AUTO: 11.2 X10*3/UL (ref 4.4–11.3)

## 2025-01-02 PROCEDURE — 86900 BLOOD TYPING SEROLOGIC ABO: CPT

## 2025-01-02 PROCEDURE — 2500000001 HC RX 250 WO HCPCS SELF ADMINISTERED DRUGS (ALT 637 FOR MEDICARE OP): Performed by: STUDENT IN AN ORGANIZED HEALTH CARE EDUCATION/TRAINING PROGRAM

## 2025-01-02 PROCEDURE — 85027 COMPLETE CBC AUTOMATED: CPT

## 2025-01-02 PROCEDURE — 7100000009 HC PHASE TWO TIME - INITIAL BASE CHARGE

## 2025-01-02 PROCEDURE — 2500000004 HC RX 250 GENERAL PHARMACY W/ HCPCS (ALT 636 FOR OP/ED): Performed by: ANESTHESIOLOGIST ASSISTANT

## 2025-01-02 PROCEDURE — 3700000002 HC GENERAL ANESTHESIA TIME - EACH INCREMENTAL 1 MINUTE

## 2025-01-02 PROCEDURE — 88305 TISSUE EXAM BY PATHOLOGIST: CPT

## 2025-01-02 PROCEDURE — 86901 BLOOD TYPING SEROLOGIC RH(D): CPT

## 2025-01-02 PROCEDURE — 3700000001 HC GENERAL ANESTHESIA TIME - INITIAL BASE CHARGE

## 2025-01-02 PROCEDURE — 84702 CHORIONIC GONADOTROPIN TEST: CPT

## 2025-01-02 PROCEDURE — 86850 RBC ANTIBODY SCREEN: CPT

## 2025-01-02 PROCEDURE — 7100000010 HC PHASE TWO TIME - EACH INCREMENTAL 1 MINUTE

## 2025-01-02 PROCEDURE — 59820 CARE OF MISCARRIAGE: CPT | Performed by: STUDENT IN AN ORGANIZED HEALTH CARE EDUCATION/TRAINING PROGRAM

## 2025-01-02 PROCEDURE — 80053 COMPREHEN METABOLIC PANEL: CPT

## 2025-01-02 RX ORDER — LIDOCAINE HYDROCHLORIDE 20 MG/ML
INJECTION, SOLUTION INFILTRATION; PERINEURAL AS NEEDED
Status: DISCONTINUED | OUTPATIENT
Start: 2025-01-02 | End: 2025-01-02

## 2025-01-02 RX ORDER — HYDROCODONE BITARTRATE AND ACETAMINOPHEN 5; 325 MG/1; MG/1
1 TABLET ORAL ONCE AS NEEDED
Status: DISCONTINUED | OUTPATIENT
Start: 2025-01-02 | End: 2025-01-03 | Stop reason: HOSPADM

## 2025-01-02 RX ORDER — KETOROLAC TROMETHAMINE 30 MG/ML
30 INJECTION, SOLUTION INTRAMUSCULAR; INTRAVENOUS ONCE
Status: DISCONTINUED | OUTPATIENT
Start: 2025-01-02 | End: 2025-01-03 | Stop reason: HOSPADM

## 2025-01-02 RX ORDER — ACETAMINOPHEN 325 MG/1
650 TABLET ORAL ONCE AS NEEDED
Status: CANCELLED | OUTPATIENT
Start: 2025-01-02

## 2025-01-02 RX ORDER — KETOROLAC TROMETHAMINE 30 MG/ML
30 INJECTION, SOLUTION INTRAMUSCULAR; INTRAVENOUS ONCE AS NEEDED
Status: DISCONTINUED | OUTPATIENT
Start: 2025-01-02 | End: 2025-01-03 | Stop reason: HOSPADM

## 2025-01-02 RX ORDER — MORPHINE SULFATE 2 MG/ML
2 INJECTION, SOLUTION INTRAMUSCULAR; INTRAVENOUS AS NEEDED
Status: CANCELLED | OUTPATIENT
Start: 2025-01-02

## 2025-01-02 RX ORDER — OXYCODONE AND ACETAMINOPHEN 5; 325 MG/1; MG/1
1 TABLET ORAL EVERY 6 HOURS PRN
Status: DISCONTINUED | OUTPATIENT
Start: 2025-01-02 | End: 2025-01-03 | Stop reason: HOSPADM

## 2025-01-02 RX ORDER — ACETAMINOPHEN 325 MG/1
650 TABLET ORAL ONCE AS NEEDED
Status: DISCONTINUED | OUTPATIENT
Start: 2025-01-02 | End: 2025-01-03 | Stop reason: HOSPADM

## 2025-01-02 RX ORDER — HYDROCODONE BITARTRATE AND ACETAMINOPHEN 5; 325 MG/1; MG/1
1 TABLET ORAL ONCE AS NEEDED
Status: CANCELLED | OUTPATIENT
Start: 2025-01-02

## 2025-01-02 RX ORDER — ONDANSETRON HYDROCHLORIDE 2 MG/ML
4 INJECTION, SOLUTION INTRAVENOUS AS NEEDED
Status: DISCONTINUED | OUTPATIENT
Start: 2025-01-02 | End: 2025-01-03 | Stop reason: HOSPADM

## 2025-01-02 RX ORDER — ONDANSETRON HYDROCHLORIDE 2 MG/ML
4 INJECTION, SOLUTION INTRAVENOUS AS NEEDED
Status: CANCELLED | OUTPATIENT
Start: 2025-01-02

## 2025-01-02 RX ORDER — MORPHINE SULFATE 2 MG/ML
2 INJECTION, SOLUTION INTRAMUSCULAR; INTRAVENOUS AS NEEDED
Status: DISCONTINUED | OUTPATIENT
Start: 2025-01-02 | End: 2025-01-03 | Stop reason: HOSPADM

## 2025-01-02 RX ORDER — OXYCODONE AND ACETAMINOPHEN 5; 325 MG/1; MG/1
1 TABLET ORAL EVERY 6 HOURS PRN
Status: CANCELLED | OUTPATIENT
Start: 2025-01-02

## 2025-01-02 RX ORDER — DOXYCYCLINE 100 MG/10ML
INJECTION, POWDER, LYOPHILIZED, FOR SOLUTION INTRAVENOUS AS NEEDED
Status: DISCONTINUED | OUTPATIENT
Start: 2025-01-02 | End: 2025-01-02

## 2025-01-02 RX ORDER — HYDROMORPHONE HYDROCHLORIDE 0.2 MG/ML
0.2 INJECTION INTRAMUSCULAR; INTRAVENOUS; SUBCUTANEOUS
Status: CANCELLED | OUTPATIENT
Start: 2025-01-02

## 2025-01-02 RX ORDER — ONDANSETRON HYDROCHLORIDE 2 MG/ML
INJECTION, SOLUTION INTRAVENOUS AS NEEDED
Status: DISCONTINUED | OUTPATIENT
Start: 2025-01-02 | End: 2025-01-02

## 2025-01-02 RX ORDER — MIDAZOLAM HYDROCHLORIDE 1 MG/ML
INJECTION, SOLUTION INTRAMUSCULAR; INTRAVENOUS AS NEEDED
Status: DISCONTINUED | OUTPATIENT
Start: 2025-01-02 | End: 2025-01-02

## 2025-01-02 RX ORDER — KETOROLAC TROMETHAMINE 30 MG/ML
INJECTION, SOLUTION INTRAMUSCULAR; INTRAVENOUS AS NEEDED
Status: DISCONTINUED | OUTPATIENT
Start: 2025-01-02 | End: 2025-01-02

## 2025-01-02 RX ORDER — FENTANYL CITRATE 50 UG/ML
INJECTION, SOLUTION INTRAMUSCULAR; INTRAVENOUS AS NEEDED
Status: DISCONTINUED | OUTPATIENT
Start: 2025-01-02 | End: 2025-01-02

## 2025-01-02 RX ORDER — KETOROLAC TROMETHAMINE 30 MG/ML
30 INJECTION, SOLUTION INTRAMUSCULAR; INTRAVENOUS ONCE AS NEEDED
Status: CANCELLED | OUTPATIENT
Start: 2025-01-02 | End: 2025-01-07

## 2025-01-02 RX ORDER — PROPOFOL 10 MG/ML
INJECTION, EMULSION INTRAVENOUS AS NEEDED
Status: DISCONTINUED | OUTPATIENT
Start: 2025-01-02 | End: 2025-01-02

## 2025-01-02 RX ORDER — KETOROLAC TROMETHAMINE 30 MG/ML
30 INJECTION, SOLUTION INTRAMUSCULAR; INTRAVENOUS ONCE
Status: CANCELLED | OUTPATIENT
Start: 2025-01-02 | End: 2025-01-02

## 2025-01-02 RX ORDER — HYDROMORPHONE HYDROCHLORIDE 2 MG/ML
0.2 INJECTION, SOLUTION INTRAMUSCULAR; INTRAVENOUS; SUBCUTANEOUS
Status: DISCONTINUED | OUTPATIENT
Start: 2025-01-02 | End: 2025-01-03 | Stop reason: HOSPADM

## 2025-01-02 RX ADMIN — LIDOCAINE HYDROCHLORIDE 100 MG: 20 INJECTION, SOLUTION INFILTRATION; PERINEURAL at 09:20

## 2025-01-02 RX ADMIN — DOXYCYCLINE 200 MG: 100 INJECTION, POWDER, LYOPHILIZED, FOR SOLUTION INTRAVENOUS at 09:21

## 2025-01-02 RX ADMIN — OXYCODONE HYDROCHLORIDE AND ACETAMINOPHEN 1 TABLET: 5; 325 TABLET ORAL at 10:00

## 2025-01-02 RX ADMIN — SODIUM CHLORIDE, SODIUM LACTATE, POTASSIUM CHLORIDE, AND CALCIUM CHLORIDE: .6; .31; .03; .02 INJECTION, SOLUTION INTRAVENOUS at 08:59

## 2025-01-02 RX ADMIN — PROPOFOL 50 MG: 10 INJECTION, EMULSION INTRAVENOUS at 09:26

## 2025-01-02 RX ADMIN — FENTANYL CITRATE 50 MCG: 50 INJECTION, SOLUTION INTRAMUSCULAR; INTRAVENOUS at 09:18

## 2025-01-02 RX ADMIN — MIDAZOLAM 2 MG: 1 INJECTION INTRAMUSCULAR; INTRAVENOUS at 09:18

## 2025-01-02 RX ADMIN — KETOROLAC TROMETHAMINE 30 MG: 30 INJECTION, SOLUTION INTRAMUSCULAR; INTRAVENOUS at 09:30

## 2025-01-02 RX ADMIN — ONDANSETRON 4 MG: 2 INJECTION INTRAMUSCULAR; INTRAVENOUS at 09:30

## 2025-01-02 RX ADMIN — DEXAMETHASONE SODIUM PHOSPHATE 4 MG: 4 INJECTION, SOLUTION INTRA-ARTICULAR; INTRALESIONAL; INTRAMUSCULAR; INTRAVENOUS; SOFT TISSUE at 09:30

## 2025-01-02 RX ADMIN — PROPOFOL 50 MG: 10 INJECTION, EMULSION INTRAVENOUS at 09:20

## 2025-01-02 ASSESSMENT — PAIN - FUNCTIONAL ASSESSMENT
PAIN_FUNCTIONAL_ASSESSMENT: 0-10

## 2025-01-02 ASSESSMENT — PAIN SCALES - GENERAL
PAINLEVEL_OUTOF10: 4
PAINLEVEL_OUTOF10: 6
PAINLEVEL_OUTOF10: 6

## 2025-01-02 ASSESSMENT — PAIN DESCRIPTION - DESCRIPTORS
DESCRIPTORS: CRAMPING;ACHING
DESCRIPTORS: CRAMPING;ACHING
DESCRIPTORS: ACHING;CRAMPING

## 2025-01-02 ASSESSMENT — COLUMBIA-SUICIDE SEVERITY RATING SCALE - C-SSRS
6. HAVE YOU EVER DONE ANYTHING, STARTED TO DO ANYTHING, OR PREPARED TO DO ANYTHING TO END YOUR LIFE?: NO
2. HAVE YOU ACTUALLY HAD ANY THOUGHTS OF KILLING YOURSELF?: NO
1. IN THE PAST MONTH, HAVE YOU WISHED YOU WERE DEAD OR WISHED YOU COULD GO TO SLEEP AND NOT WAKE UP?: NO

## 2025-01-02 NOTE — PROGRESS NOTES
IVF Procedure Area H&P    Ms. Jocelyn Dee is a 44 y.o. F who presents for ipas under anesthesia.   Interval history reviewed and affirmed as up to date.      MedHx:   Past Medical History:   Diagnosis Date    Papillomavirus as the cause of diseases classified elsewhere     HPV in female    Personal history of other complications of pregnancy, childbirth and the puerperium     History of spontaneous     Personal history of other diseases of the female genital tract     History of infertility    Personal history of other infectious and parasitic diseases     History of infection due to human papilloma virus (HPV)       SurgHx:   Past Surgical History:   Procedure Laterality Date    OTHER SURGICAL HISTORY  2022    Tonsillectomy    OTHER SURGICAL HISTORY  2022    Cervical surgery       Meds:   Current Outpatient Medications on File Prior to Encounter   Medication Sig Dispense Refill    cholecalciferol (Vitamin D-3) 25 MCG (1000 UT) capsule Take 1 capsule (25 mcg) by mouth once daily.      estradiol (Vivelle-Dot) 0.1 mg/24 hr patch Place 3 patches over 48 hours on the skin 4 times a week. Apply three (3) new patches to lower stomach once every other day as directed per provider. (Patient not taking: Reported on 2025) 48 patch 2    estradiol (Vivelle-Dot) 0.1 mg/24 hr patch Apply three (3) new patches to lower stomach once every other day as directed per provider. (Patient not taking: Reported on 2025) 48 patch 2    leuprolide, 1-month, (Lupron Depot) 3.75 mg injection Inject 3.75 mg into the muscle every 30 (thirty) days. (Patient not taking: Reported on 2025)      levothyroxine (Synthroid, Levoxyl) 75 mcg tablet Take 1 tablet (75 mcg) by mouth once daily. Take on an empty stomach at the same time each day, either 30 to 60 minutes prior to breakfast. Repeat TSH 4-6 weeks, after week of 2025 30 tablet 1    lidocaine-prilocaine (Emla) 2.5-2.5 % cream Apply topically once daily.  "Apply to treatment area 1 hour prior to injection. (Patient not taking: Reported on 1/2/2025) 30 g 2    medroxyPROGESTERone (Provera) 10 mg tablet Take 1 tablet (10 mg) by mouth once daily. (Patient not taking: Reported on 1/2/2025) 7 tablet 0    predniSONE (Deltasone) 10 mg tablet Take 0.5 tablets (5 mg) by mouth once daily. Begin taking the day you start progesterone in oil. 30 tablet 2    prenat.vits,shellie,min-iron-folic tablet Take 1 tablet by mouth once daily.      progesterone (Prometrium) 200 mg capsule Insert 1 capsule (200 mg) into the vagina 2 times a day. Insert 1 capsule (200 mg) into the vagina 2 times a day (Patient not taking: Reported on 1/2/2025) 60 capsule 2    progesterone 50 mg/mL injection Inject 2 mL (100 mg) into the muscle once daily. Inject into the muscle at the same time each morning as directed per provider. (Patient not taking: Reported on 1/2/2025)      progesterone 50 mg/mL injection Inject 2 mL (100 mg) into the muscle once daily. Inject into the muscle at the same time each morning as directed per provider. (Patient not taking: Reported on 1/2/2025) 50 mL 3    sertraline (Zoloft) 50 mg tablet Take 1 tablet (50 mg) by mouth once daily. 90 tablet 3    Soolantra 1 % cream 1 Application once daily. APPLY TOPICALLY TO FACE 1 TIME IN THE MORNING      transparent dressings 2 3/8 X 2 3/4 \" bandage Apply 1 each topically once daily. Use as directed to cover lidocaine cream. (Patient not taking: Reported on 1/2/2025) 30 each 3    [DISCONTINUED] levothyroxine (Synthroid, Levoxyl) 50 mcg tablet Take 1 tablet (50 mcg) by mouth once daily. 30 tablet 2     No current facility-administered medications on file prior to encounter.       Allergies: No Known Allergies    ROS: negative apart from what is indicated above    PE:   Gen: AA x 3   Resp: No labored breathing  Abdomen: soft, non-tender, non-distended    A/P: Ms. Jocelyn Dee is a 44 y.o. F who presents for above procedure under anesthesia in " the IVF procedure area. Okay to proceed with above stated procedure.    Aye Teresa

## 2025-01-02 NOTE — PROGRESS NOTES
Patient ID: Jocelyn Dee is a 44 y.o. female.    Manual Vacuum Aspiration    Date/Time: 2025 9:13 AM    Performed by: Aye Teresa MD  Authorized by: Jaymie Chong MD    Consent:     Consent obtained:  Written    Consent given by:  Patient    Risks, benefits, and alternatives were discussed: yes      Risks discussed:  Bleeding, pain and infection    Alternatives discussed:  No treatment  Universal protocol:     Procedure explained and questions answered to patient or proxy's satisfaction: yes      Relevant documents present and verified: yes      Test results available: yes      Imaging studies available: yes      Immediately prior to procedure, a time out was called: yes      Patient identity confirmed:  Verbally with patient and arm band  Pre-procedure details:     Skin preparation:  Povidone-iodine    Preparation: Patient was prepped and draped in the usual sterile fashion    Procedure specific details:      Jocelyn Dee was diagnosed with an intrauterine embryonic/fetal demise on 2024 measuring 6w1d GA by ultrasound.  Patient desires:  Hospital disposition   Dr. Aye Teresa MD  Electronically signed to expedite processing.      Anesthesia: MAC  Assistant: none    Procedure:  Patient presents for a D & C due to a missed . Patient's Rh status is Rh Positive (Rh+).     Discussed risks, benefits, alternatives and potential complications of surgical management. Written Informed Consent was obtained prior to the procedure and is detailed in the patient's record.    The patient was placed in lithotomy position. Speculum placed in vagina, with good visualization of the cervix. The vagina was prepped with betadine.  Lidocaine: No    Surgical Findings:  6 week sized uterus    D&C:  A single toothed tenaculum was placed on the anterior lip of the cervix. The cervix was serially dilated.  The procedure was performed with ultrasound visualization.  The  6 mm suction curette was  advanced to the fundus, and the device was activated. Two passes were performed. On bedside ultrasound, the endometrial stripe was noted to be thin.     Hemostasis was achieved. Specimen was sent to pathology. Specimen was sent to Genetics lab.     Procedure Note:  The instruments were removed from the vagina. All counts were correct.     Aye Teresa 01/02/25 9:13 AM        Post-procedure details:     Procedure completion:  Tolerated well, no immediate complications

## 2025-01-02 NOTE — DISCHARGE INSTRUCTIONS
[facility]  [address]. [place]  [address]  Tel: [phone]   Fax: [phone]    Home Going Instructions after IPAS (D&C):    Activity:   We do not recommend any exercise on the day of your IPAS.  You may return to work the following day if you are feeling OK.  You may notice increase bleeding with activity.  You may have light bleeding or spotting for several weeks after IPAS.   Use only sanitary pads for bleeding, do not use tampons until you are told it is  OK by your provider.   Please abstain from intercourse until cleared by your provider.    Anesthesia:  Drink small amounts of liquids initially and then slowly increase your intake of food. Drinking fluids will keep your bowels regular.   Avoid foods that are sweet, spicy or hard to digest today.  If you feel nauseated, rest your stomach for one hour, and then try drinking clear liquids again.  You may take a stool softener or miralax/milk of magnesia to help with constipation that may occur after anesthesia.  Please make sure a responsible adult is with you for at least 24 hours after surgery and do not drive or make important decisions during this time. Anesthesia may affect your judgment, coordination, and reaction time.    Pain:  Pain can be managed by taking Ibuprofen and Tylenol.  You will likely have mild cramping for a few days following IPAS.    Follow up:  You will require follow up HCG testing weekly until HCG is negative. If you have not scheduled this yet, please call the office in the next few days.  Your provider will call you with any genetic testing results.  You may require a cavity evaluation (hysteroscopy or saline ultrasound) prior to further treatment at the discretion of your provider.   Your next period may be earlier or late following IPAS.    When to call your provider:  Vaginal bleeding that is more than a normal period that doesn't taper down.  Bleeding  through 1 sanitary pad an hour.  Any clots the size of an egg or bigger.  Fever of 100.4 or higher with chills.  Unusual or foul smelling discharge.  Severe abdominal pain    Analy Benitez    8:40 AM

## 2025-01-02 NOTE — NURSING NOTE
Patient discharged to home in stable condition via wheelchair to RIDE HOME: Partner's car. VSS at time of discharge. Discharge instructions given and concerns addressed.    Analy Benitez RN 01/02/25 11:02 AM

## 2025-01-02 NOTE — ANESTHESIA POSTPROCEDURE EVALUATION
Patient: Jocelyn Dee    Procedure Summary       Date: 01/02/25 [facility]    Anesthesia Start: 0912 Anesthesia Stop: 0942    Procedure: MANUAL VACUUM ASPIRATION Diagnosis: Encounter for preprocedural laboratory examination    Scheduled Providers: Aye Teresa MD; Reji Wilson MD; DEEP De La Cruz Responsible Provider: Reji Wilson MD    Anesthesia Type: MAC ASA Status: 2            Anesthesia Type: MAC    Vitals Value Taken Time   /78 01/02/25 0937   Temp 36.5 °C (97.7 °F) 01/02/25 0937   Pulse 79 01/02/25 0937   Resp 18 01/02/25 0937   SpO2 99 % 01/02/25 0937       Anesthesia Post Evaluation    Patient location during evaluation: bedside  Patient participation: complete - patient participated  Level of consciousness: awake  Pain management: adequate  Airway patency: patent  Cardiovascular status: acceptable  Respiratory status: acceptable  Hydration status: acceptable  Postoperative Nausea and Vomiting: none        No notable events documented.

## 2025-01-09 ENCOUNTER — LAB (OUTPATIENT)
Dept: LAB | Facility: LAB | Age: 45
End: 2025-01-09
Payer: COMMERCIAL

## 2025-01-09 DIAGNOSIS — O02.1 MISSED ABORTION (HHS-HCC): ICD-10-CM

## 2025-01-09 LAB — B-HCG SERPL-ACNC: 328 MIU/ML

## 2025-01-09 PROCEDURE — 84702 CHORIONIC GONADOTROPIN TEST: CPT

## 2025-01-09 NOTE — PROGRESS NOTES
Patient going to  lab for 1 week follow up HCG after IPAS.  Patient had IPAS on 1/2 after missed AB was diagnosed on 12/30.   Pregnancy was achieved by FET on 11/27 with donor egg embryo.   Will call patient with results and plan.     Saira Alexandre 01/09/25 8:22 AM    Patient labs not back prior to end of day, added to noon huddle for tomorrow for HCG review.     Saira Alexandre 01/09/25 4:32 PM

## 2025-01-10 DIAGNOSIS — O03.9 MISCARRIAGE (HHS-HCC): Primary | ICD-10-CM

## 2025-01-10 DIAGNOSIS — N97.9 FEMALE INFERTILITY: ICD-10-CM

## 2025-01-10 NOTE — PROGRESS NOTES
Patient going to  lab for 1 week follow up HCG after IPAS.  Patient had IPAS on 1/2 after missed AB was diagnosed on 12/30.   Pregnancy was achieved by FET on 11/27 with donor egg embryo.   Will call patient with results and plan.   Patient added to huddle today as labs not back yesterday 1/9 before end of day.     Component      Latest Ref Rng 1/2/2025 1/9/2025   HCG, Beta-Quantitative      <5 mIU/mL 27,130 (H)  328 (H)       Saira Alexandre 01/10/25 7:58 AM    Monitoring patient: Miscarriage with IUP & IPAS on 1-2-2024, repeat HCG Level today trending down to 328 from 27,130 on 1-2-2024. Patient to repeat an HCG Level in 4 weeks or with a menses. Plan to be communicated by RN. Plan agreed upon by Dr. Chong. Perla Escobedo, AHLAJI 01/10/25 1:04 PM     Called patient with plan to repeat HCG with period or if no period check HCG in 4 weeks.   Patient called into office at same time as plan was being reviewed.   Patient attempting to schedule OB scan, states she was told my nurse after IPAS she needed follow up OB scan to ensure no POC were left after procedure.   Let patient know I will discuss with provider and give her a call back as we normally get lab work following IPAS not another OB scan.       Saira Alexandre 01/10/25 11:55 AM  ===  Will have patient call back in 4 weeks and plan for repeat hcg    Haylie Saleh 01/10/25 1:22 PM    Called patient and let her know she will need next follow up HCG in 4 weeks, she does not need another OB scan.   Patient verbalized understanding and all questions and concerns addressed.    Saira Alexandre 01/10/25 1:25 PM

## 2025-01-14 ENCOUNTER — TELEMEDICINE (OUTPATIENT)
Dept: ENDOCRINOLOGY | Facility: CLINIC | Age: 45
End: 2025-01-14
Payer: COMMERCIAL

## 2025-01-14 VITALS — WEIGHT: 153 LBS | BODY MASS INDEX: 26.12 KG/M2 | HEIGHT: 64 IN

## 2025-01-14 DIAGNOSIS — Z31.41 FERTILITY TESTING: Primary | ICD-10-CM

## 2025-01-14 DIAGNOSIS — N96 RECURRENT PREGNANCY LOSS WITHOUT CURRENT PREGNANCY: ICD-10-CM

## 2025-01-14 DIAGNOSIS — Z01.812 ENCOUNTER FOR PREPROCEDURAL LABORATORY EXAMINATION: ICD-10-CM

## 2025-01-14 RX ORDER — HYDROXYCHLOROQUINE SULFATE 200 MG/1
200 TABLET, FILM COATED ORAL
COMMUNITY

## 2025-01-14 ASSESSMENT — PATIENT HEALTH QUESTIONNAIRE - PHQ9
1. LITTLE INTEREST OR PLEASURE IN DOING THINGS: SEVERAL DAYS
SUM OF ALL RESPONSES TO PHQ9 QUESTIONS 1 AND 2: 1
10. IF YOU CHECKED OFF ANY PROBLEMS, HOW DIFFICULT HAVE THESE PROBLEMS MADE IT FOR YOU TO DO YOUR WORK, TAKE CARE OF THINGS AT HOME, OR GET ALONG WITH OTHER PEOPLE: NOT DIFFICULT AT ALL
2. FEELING DOWN, DEPRESSED OR HOPELESS: NOT AT ALL

## 2025-01-14 ASSESSMENT — PAIN SCALES - GENERAL: PAINLEVEL_OUTOF10: 0-NO PAIN

## 2025-01-14 NOTE — PROGRESS NOTES
Virtual or Telephone Consent: An interactive audio and video telecommunication system which permits real time communications between the patient (at the originating site) and provider (at the distant site) was utilized to provide this telehealth service and Verbal consent was requested and obtained from Jocelyn Dee on this date, 25 for a telehealth visit.    Follow Up Visit HPI    Patient is a 44 y.o.  female with Primary Ovarian Insufficiency presenting today for follow up visit after miscarriage following donor egg transer    Prior note reviewed/updated:  Had 2 negative FETs as below  Has seen Dr Reyna for endometriosis assessment--they decided not to proceed with laparoscopy   Now comes to discuss further management  Plan for endometriosis suppression prior to next FET    Also sees Rheumatologist- +LEELEE  Plans for Plaquenil to be started today    OB hx:  1. -ETOP age 33  2. -SAB, 1st trimester, missed ab. Conceived easily. Passed with cytotec. Age 37  -Both of the above with different partners  3. - missed AB following donor egg FET; D&C, ANORA results demonstrated normal chromosomes    Testing to date:        Latest Reference Range & Units Most Recent   Hemoglobin A1C see below % 5.2  24 08:39   HCG, Beta-Quantitative <5 mIU/mL 328 (H)  25 14:53   Thyroxine, Free 0.61 - 1.12 ng/dL 0.90  24 23:25   Progesterone ng/mL 110.3  24 11:06   Thyroid Stimulating Hormone 0.44 - 3.98 mIU/L 3.74  24 23:25   Vitamin D, 25-Hydroxy, Total ng/mL 47  23 11:40   Estradiol pg/mL 512  24 13:03   GLUCOSE 74 - 99 mg/dL 88  25 08:28   Estimated Average Glucose Not Established mg/dL 103  24 08:39   INR 0.9 - 1.1  1.2 (H)  24 18:02   DRVVT Screen RATIO 1.06  24 08:39   DRVVT Confirmation RATIO 0.89  24 08:39   Beta-2 Glyco 1 IgG <20.0 U/mL <1.4  24 08:39   Beta 2 Glyco 1 IgM <20.0 U/mL 1.8  24 08:39   Beta-2 Glyco 1 IgA <20.0 U/mL  0.6  4/22/24 08:39   Anticardiolipin IgG <20.0 GPL U/mL <1.6  4/22/24 08:39   Anticardiolipin IgM <20.0 MPL U/mL 1.2  4/22/24 08:39   Anticardiolipin IgA <20.0 APL U/mL 0.8  4/22/24 08:39   Protime 9.8 - 12.8 seconds 13.7 (H)  5/4/24 18:02   SCT Test Ratio <=1.16 RATIO 1.12  4/22/24 08:39   DRVVT Test Ratio <=1.20 RATIO 1.19  4/22/24 08:39   SCT Confirmation RATIO 0.84  4/22/24 08:39   SCT Screen RATIO 0.94  4/22/24 08:39   Lupus Anticoagulant Interpretation  No evidence of lupus anticoagulant in these assays (DRVVT and Silica Clotting Time (SCT)). 4/22/24 08:39   (H): Data is abnormally high  Hysterosalpingogram: FL HYSTEROSALPINGOGRAM (6/17/2024):   IMPRESSION:  1. Correlate with real time fluoroscopic findings at the time of  procedure.  2. Normal study.    Saline Infused Sonography: SIS: Normal, EE 6 mm, ovaries small with 0 AFC noted   Pelvic MRI:      FINDINGS:  UTERUS:  The uterus is retroverted and measures 5.6 x 2.8 x 7.2 cm. There is a  subserosal fibroid in the anterior uterine wall near the fundus  measuring 1.8 x 1 cm. The thickness of the junctional zone is within  normal limits.   The endometrium has normal thickness and appearance.  There are small nabothian cysts. The vagina and vulva are otherwise  unremarkable. Note is made of T2 hypointense asymmetric thickening of  the left round ligament. No additional T2 hypointense plaques or  thickening of the pelvic ligaments is noted.      OVARIES/ADNEXA:      RIGHT:  The right ovary is normal in size and appears unremarkable.   There  is no cystic or solid mass or endometriosis. There is  no  hydrosalpinx.      LEFT:  The left ovary is normal in size and appears unremarkable.   There is  no cystic or solid mass or endometriosis. There is  no hydrosalpinx.      PERITONEAL FLUID:  No  free or loculated fluid collections are evident in the pelvis.      PELVIC LYMPH NODES:  No abnormally enlarged pelvic lymph nodes are identified.      BOWEL:  Unremarkable.       BONES:  No focal lesions are noted in the bone.      IMPRESSION:  1.  T2 hypointense asymmetric thickening of the left round ligament.  Findings are nonspecific with deep infiltrating endometriosis not  excluded. However, no definite evidence of nodules or plaques in the  pelvis.  2. Note is made of a small subserosal fibroid in the anterior uterine  wall. 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  -Egg donor     Patient has had  Fragile X negative  Karyotype 46 XY  -No other genetic screening done       Treatment to date:   Fertility Cycles       Cycle Name Treatment Start Date Type Outcome    FET #3 11/06/2024 Embryo Transfer, Thaw (Embryo) Active    MOCK CYCLE , BCL6 ERA 01/20/2024  No Transfer    FET #2 12/13/2023 Embryo Transfer, Thaw (Embryo) No Pregnancy          Agency donor- 23 eggs, 17 mature, 15 2pn  9 blasts frozen     FET #1- Programmed FET with oral estrace and IM P4 75 mg--> had spotting prior to FET, negative hCG     FET #2- Aygestin pre-treatment x 1 month, Estrace patches and IM P4 100 mg+ vaginal prometrium 200 mg BID--> negative hCG     7 donor egg blasts remaining    FET#3: Lupron +Letrozole x 2 months prior to FET  Programmed FET with estrace patches, IM P4 100 mg+ vaginal prometrium 200 mg BID (starting 1 day after IM P4)  Prednisone  10 mg to start with progesterone per protocol  Stay on Plaquenil per rheumatology recommendations    -Should have 6 embryos remaining     Past Medical History:   Diagnosis Date     Papillomavirus as the cause of diseases classified elsewhere     HPV in female    Personal history of other complications of pregnancy, childbirth and the puerperium     History of spontaneous     Personal history of other diseases of the female genital tract     History of infertility    Personal history of other infectious and parasitic diseases     History of infection due to human papilloma virus (HPV)     Past Surgical History:   Procedure Laterality Date    DILATION AND CURETTAGE OF UTERUS N/A     IPAS    OTHER SURGICAL HISTORY  2022    Tonsillectomy    OTHER SURGICAL HISTORY  2022    Cervical surgery     -Did have D&C at [facility] due to irregular bleeding with HRT, was chelsey     Current Outpatient Medications on File Prior to Visit   Medication Sig Dispense Refill    cholecalciferol (Vitamin D-3) 25 MCG (1000 UT) capsule Take 1 capsule (25 mcg) by mouth once daily.      hydroxychloroquine (Plaquenil) 200 mg tablet Take 1 tablet (200 mg) by mouth.      levothyroxine (Synthroid, Levoxyl) 75 mcg tablet Take 1 tablet (75 mcg) by mouth once daily. Take on an empty stomach at the same time each day, either 30 to 60 minutes prior to breakfast. Repeat TSH 4-6 weeks, after week of 2025 30 tablet 1    predniSONE (Deltasone) 10 mg tablet Take 0.5 tablets (5 mg) by mouth once daily. Begin taking the day you start progesterone in oil. 30 tablet 2    prenat.vits,shellie,min-iron-folic tablet Take 1 tablet by mouth once daily.      sertraline (Zoloft) 50 mg tablet Take 1 tablet (50 mg) by mouth once daily. 90 tablet 3    estradiol (Vivelle-Dot) 0.1 mg/24 hr patch Place 3 patches over 48 hours on the skin 4 times a week. Apply three (3) new patches to lower stomach once every other day as directed per provider. (Patient not taking: Reported on 2025) 48 patch 2    estradiol (Vivelle-Dot) 0.1 mg/24 hr patch Apply three (3) new patches to lower stomach once every other day as directed per provider.  "(Patient not taking: Reported on 2025) 48 patch 2    leuprolide, 1-month, (Lupron Depot) 3.75 mg injection Inject 3.75 mg into the muscle every 30 (thirty) days. (Patient not taking: Reported on 2025)      lidocaine-prilocaine (Emla) 2.5-2.5 % cream Apply topically once daily. Apply to treatment area 1 hour prior to injection. (Patient not taking: Reported on 2025) 30 g 2    medroxyPROGESTERone (Provera) 10 mg tablet Take 1 tablet (10 mg) by mouth once daily. (Patient not taking: Reported on 2025) 7 tablet 0    progesterone (Prometrium) 200 mg capsule Insert 1 capsule (200 mg) into the vagina 2 times a day. Insert 1 capsule (200 mg) into the vagina 2 times a day (Patient not taking: Reported on 2025) 60 capsule 2    progesterone 50 mg/mL injection Inject 2 mL (100 mg) into the muscle once daily. Inject into the muscle at the same time each morning as directed per provider. (Patient not taking: Reported on 2025)      progesterone 50 mg/mL injection Inject 2 mL (100 mg) into the muscle once daily. Inject into the muscle at the same time each morning as directed per provider. (Patient not taking: Reported on 2025) 50 mL 3    Soolantra 1 % cream 1 Application once daily. APPLY TOPICALLY TO FACE 1 TIME IN THE MORNING      transparent dressings 2 3/8 X 2 3/4 \" bandage Apply 1 each topically once daily. Use as directed to cover lidocaine cream. (Patient not taking: Reported on 2025) 30 each 3     No current facility-administered medications on file prior to visit.       BMI:   BMI Readings from Last 1 Encounters:   25 26.26 kg/m²     VITALS:  Ht 1.626 m (5' 4\")   Wt 69.4 kg (153 lb)   LMP 2024 (Approximate)   Breastfeeding Unknown   BMI 26.26 kg/m²   LMP: Patient's last menstrual period was 2024 (approximate).    ASSESSMENT   44 y.o.  female with infertility , Recurrent Pregnancy Loss and Primary Ovarian Insufficiency and the following pertinent medical issues: " SAB following donor egg FET; history of +LEELEE, +BCL6 failed donor egg FET x2.    COUNSELING  We reviewed testing and treatment to date  Reviewed that we do not know reason for SAB, we can rule out chromosome abnormality with normal ANORA and likely good quality with donor egg  Patient does have possible lupus and we did better this time with endo suppression + prednisone and plaquenil    Reviewed option of gestational carrier if patient has another clinical loss      PLAN  Orders Placed This Encounter   Procedures    Hysteroscopy diagnostic    POCT pregnancy, urine manually resulted       FOLLOW UP     MD Completion:  Ectopic Risk: No  Medically Complex: No  Outstanding boarding pass items: updated hysteroscopy prior to next FET, update any  other labs as needed    Fertility Plan Update:  -If patient wants to proceed again will need repeat hysteroscopy after hCG trends to zero (repeat in 4 weeks from last hCG)  Lupron + Letrozole x 2 months prior to FET-   For next FET-  Programmed FET Estrace patches, IM P4 100 mg+ vaginal prometrium 200 mg BID (starting 1 day after IM P4)  Prednisone  10 mg to start with STIM START  Stay on Plaquenil per rheumatology recommendations  Baby ASA and Lovenox 40 mg subcutaneous daily to start with transfer    Jaymie Chong 01/14/25 2:41 PM

## 2025-01-21 ENCOUNTER — APPOINTMENT (OUTPATIENT)
Dept: ALLERGY | Facility: CLINIC | Age: 45
End: 2025-01-21
Payer: COMMERCIAL

## 2025-01-21 VITALS
BODY MASS INDEX: 26.12 KG/M2 | DIASTOLIC BLOOD PRESSURE: 62 MMHG | HEART RATE: 74 BPM | RESPIRATION RATE: 17 BRPM | HEIGHT: 64 IN | SYSTOLIC BLOOD PRESSURE: 100 MMHG | TEMPERATURE: 97.7 F | OXYGEN SATURATION: 95 % | WEIGHT: 153 LBS

## 2025-01-21 DIAGNOSIS — E88.09 DEFICIENCY OF MANNAN-BINDING PROTEIN: ICD-10-CM

## 2025-01-21 DIAGNOSIS — B37.9 YEAST INFECTION: ICD-10-CM

## 2025-01-21 DIAGNOSIS — R09.81 NASAL CONGESTION: ICD-10-CM

## 2025-01-21 DIAGNOSIS — J32.9 CHRONIC SINUSITIS, UNSPECIFIED LOCATION: Primary | ICD-10-CM

## 2025-01-21 PROCEDURE — 99214 OFFICE O/P EST MOD 30 MIN: CPT | Performed by: ALLERGY & IMMUNOLOGY

## 2025-01-21 PROCEDURE — 3008F BODY MASS INDEX DOCD: CPT | Performed by: ALLERGY & IMMUNOLOGY

## 2025-01-21 PROCEDURE — 95004 PERQ TESTS W/ALRGNC XTRCS: CPT | Performed by: ALLERGY & IMMUNOLOGY

## 2025-01-21 RX ORDER — AZITHROMYCIN 250 MG/1
TABLET, FILM COATED ORAL
Qty: 6 TABLET | Refills: 0 | Status: SHIPPED | OUTPATIENT
Start: 2025-01-21 | End: 2025-01-21 | Stop reason: SDUPTHER

## 2025-01-21 RX ORDER — FLUCONAZOLE 150 MG/1
150 TABLET ORAL ONCE
Qty: 1 TABLET | Refills: 1 | Status: SHIPPED | OUTPATIENT
Start: 2025-01-21 | End: 2025-01-21

## 2025-01-21 RX ORDER — AZITHROMYCIN 250 MG/1
TABLET, FILM COATED ORAL
Qty: 6 TABLET | Refills: 0 | Status: SHIPPED | OUTPATIENT
Start: 2025-01-21 | End: 2025-01-26

## 2025-01-21 RX ORDER — FLUTICASONE PROPIONATE 50 MCG
2 SPRAY, SUSPENSION (ML) NASAL DAILY
Qty: 16 G | Refills: 5 | Status: SHIPPED | OUTPATIENT
Start: 2025-01-21 | End: 2026-01-21

## 2025-01-21 RX ORDER — FLUTICASONE PROPIONATE 50 MCG
2 SPRAY, SUSPENSION (ML) NASAL DAILY
Qty: 16 G | Refills: 5 | Status: SHIPPED | OUTPATIENT
Start: 2025-01-21 | End: 2025-01-21 | Stop reason: SDUPTHER

## 2025-01-21 RX ORDER — FLUCONAZOLE 150 MG/1
150 TABLET ORAL ONCE
Qty: 1 TABLET | Refills: 1 | Status: SHIPPED | OUTPATIENT
Start: 2025-01-21 | End: 2025-01-21 | Stop reason: SDUPTHER

## 2025-01-21 NOTE — PATIENT INSTRUCTIONS
Chronic sinusitis without allergy.  You had an excellent response to Pneumovax.  You have slightly decreased Ochoa binding lectin.  We are monitoring this via labs.  Call if you are having illness symptoms.    Follow up 3-6 months    Allergy test are negative to cat, dog, dust mite, feather, cockroach, molds and pollens.

## 2025-01-21 NOTE — PROGRESS NOTES
Patient ID: Jocelyn Dee is a 44 y.o. female.     Chief Complaint: follow up  History Of Present Illness  Jocelyn Dee is a 44 y.o. female with PMx recurrent pregnancy loss, now with recent illness and cough presenting for follow up.     Asthma  Cough for several weeks  Has tried OTC meds  Now just coughing in the morning  Yellow sputum    Rhinoconjunctivitis  No    Drug Allergy   No    Insect Allergy   No    Infections  No history of frequent or recurrent infections      Review of Systems    Pertinent positives and negatives have been assessed in the HPI. All other systems have been reviewed and are negative except as noted in the HPI.    Allergies  Patient has no known allergies.    Past Medical History  She has a past medical history of Papillomavirus as the cause of diseases classified elsewhere, Personal history of other complications of pregnancy, childbirth and the puerperium, Personal history of other diseases of the female genital tract, and Personal history of other infectious and parasitic diseases.    Family History  Family History   Problem Relation Name Age of Onset    Breast cancer Mother      Lung cancer Mother      Diabetes Other      Drug abuse Other      Heart disease Other         No history of food allergy or atopic disease in the family.    Surgical History  She has a past surgical history that includes Other surgical history (01/11/2022); Other surgical history (01/11/2022); and Dilation and curettage of uterus (N/A, 2025).    Social/Environmental History  She reports that she has never smoked. She has never used smokeless tobacco. She reports current alcohol use. She reports that she does not use drugs.    MEDICATIONS  Current Outpatient Medications on File Prior to Visit   Medication Sig Dispense Refill    cholecalciferol (Vitamin D-3) 25 MCG (1000 UT) capsule Take 1 capsule (25 mcg) by mouth once daily.      hydroxychloroquine (Plaquenil) 200 mg tablet Take 1 tablet (200 mg) by  mouth.      levothyroxine (Synthroid, Levoxyl) 75 mcg tablet Take 1 tablet (75 mcg) by mouth once daily. Take on an empty stomach at the same time each day, either 30 to 60 minutes prior to breakfast. Repeat TSH 4-6 weeks, after week of 1- 30 tablet 1    prenat.vits,shellie,min-iron-folic tablet Take 1 tablet by mouth once daily.      sertraline (Zoloft) 50 mg tablet Take 1 tablet (50 mg) by mouth once daily. 90 tablet 3    estradiol (Vivelle-Dot) 0.1 mg/24 hr patch Place 3 patches over 48 hours on the skin 4 times a week. Apply three (3) new patches to lower stomach once every other day as directed per provider. (Patient not taking: Reported on 1/21/2025) 48 patch 2    estradiol (Vivelle-Dot) 0.1 mg/24 hr patch Apply three (3) new patches to lower stomach once every other day as directed per provider. (Patient not taking: Reported on 1/21/2025) 48 patch 2    leuprolide, 1-month, (Lupron Depot) 3.75 mg injection Inject 3.75 mg into the muscle every 30 (thirty) days. (Patient not taking: Reported on 1/21/2025)      lidocaine-prilocaine (Emla) 2.5-2.5 % cream Apply topically once daily. Apply to treatment area 1 hour prior to injection. (Patient not taking: Reported on 1/2/2025) 30 g 2    medroxyPROGESTERone (Provera) 10 mg tablet Take 1 tablet (10 mg) by mouth once daily. (Patient not taking: Reported on 1/2/2025) 7 tablet 0    predniSONE (Deltasone) 10 mg tablet Take 0.5 tablets (5 mg) by mouth once daily. Begin taking the day you start progesterone in oil. (Patient not taking: Reported on 1/21/2025) 30 tablet 2    progesterone (Prometrium) 200 mg capsule Insert 1 capsule (200 mg) into the vagina 2 times a day. Insert 1 capsule (200 mg) into the vagina 2 times a day (Patient not taking: Reported on 1/21/2025) 60 capsule 2    progesterone 50 mg/mL injection Inject 2 mL (100 mg) into the muscle once daily. Inject into the muscle at the same time each morning as directed per provider. (Patient not taking: Reported  "on 1/21/2025)      progesterone 50 mg/mL injection Inject 2 mL (100 mg) into the muscle once daily. Inject into the muscle at the same time each morning as directed per provider. (Patient not taking: Reported on 1/21/2025) 50 mL 3    Soolantra 1 % cream 1 Application once daily. APPLY TOPICALLY TO FACE 1 TIME IN THE MORNING (Patient not taking: Reported on 1/21/2025)      transparent dressings 2 3/8 X 2 3/4 \" bandage Apply 1 each topically once daily. Use as directed to cover lidocaine cream. (Patient not taking: Reported on 1/21/2025) 30 each 3     No current facility-administered medications on file prior to visit.     Physical Exam  Visit Vitals  /62   Pulse 74   Temp 36.5 °C (97.7 °F) (Temporal)   Resp 17   Ht 1.626 m (5' 4\")   Wt 69.4 kg (153 lb)   LMP 01/01/2024 (Approximate)   SpO2 95%   BMI 26.26 kg/m²   OB Status Having periods   Smoking Status Never   BSA 1.77 m²       Wt Readings from Last 1 Encounters:   01/21/25 69.4 kg (153 lb)       Physical Exam    General: Well appearing, no acute distress  Head: Normocephalic, atraumatic, neck supple with shotty cvl lymphadenopathy  Eyes: EOMI, non-injected  Nose: No nasal crease, nares with mild congestion. Pressure across nasal bridge  Throat: Normal dentition, no erythema, mucoid pnd  Heart: Regular rate and rhythm  Lungs: Clear to auscultation bilaterally, effort normal  Extremities: Moves all extremities symmetrically, no edema  Skin: No rashes/lesions  Psych: normal mood and affect    LAB RESULTS:  CBC:  Recent Labs     01/02/25 0828 12/24/24 2325 05/04/24 1701 04/22/24  0839   WBC 11.2 15.4* 18.9* 7.1  7.1   HGB 13.1 12.9 15.5 13.9  13.9   HCT 38.7 37.6 46.1* 41.9  41.9    480* 351 348  348   MCV 90 89 91 93  93   EOSABS  --  0.39 0.09 0.14       CMP:  Recent Labs     01/02/25 0828 12/24/24 2325 05/04/24  1701 04/22/24  0839 01/11/23  1140    135* 139   < > 140   K 4.3 3.8 3.5   < > 3.7    102 105   < > 105   CO2 24 23 23   " < > 27   ANIONGAP 10 14 15   < > 12   BUN 9 14 9   < > 8   CREATININE 0.59 0.57 0.69   < > 0.66   EGFR >90 >90 >90   < >  --    MG  --   --   --   --  2.30    < > = values in this interval not displayed.     Recent Labs     01/02/25  0828 12/24/24  2325 05/04/24  1701   ALBUMIN 3.8 4.0 4.7   ALKPHOS 56 64 31*   ALT 20 19 17   AST 22 22 15   BILITOT 0.3 0.2 0.4   LIPASE  --   --  13       ALLERGY:   Recent Labs     12/24/24  2325 05/04/24  1701 04/22/24  0839   EOSABS 0.39 0.09 0.14         IMMUNO:   Recent Labs     04/22/24  0839                   Component  Ref Range & Units 6 mo ago 9 mo ago   Serotype 1  ug/mL 2.19 0.42   Serotype 2  ug/mL 13.74 0.44   Serotype 3  ug/mL 4.35 0.64   Serotype 4  ug/mL 8.17 0.98   Serotype 5  ug/mL 5.11 0.63   Serotype 8  ug/mL 7.77 0.22   Serotype 9N  ug/mL 8.10 0.29   Serotype 12F  ug/mL 1.50 0.13   Serotype 14  ug/mL 1.92 0.21   Serotype 17F  ug/mL 10.56 0.46   Serotype 19F  ug/mL 10.95 2.78   Serotype 20  ug/mL 13.35 1.97   Serotype 22F  ug/mL 2.12 0.07   Serotype 23F  ug/mL 5.20 0.21   Serotype 6B(26)  ug/mL 0.41 0.28   Serotype 10A(34)  ug/mL 3.57 1.49   Serotype 11A(43)  ug/mL >3.45 0.93   Serotype 7F(51)  ug/mL 18.57 0.32   Serotype 15B(54)  ug/mL 7.21 0.33   Serotype 18C(56)  ug/mL >11.15 0.69   Serotype 19A(57)  ug/mL 1.30 1.19   Serotype 9V(68)  ug/mL >11.73 0.80   Serotype 33F(70)  ug/mL >13.39 5.91   Pneumo Serotype Interpretation See Note See Note CM   Comment: INTERPRETIVE INFORMATION: Streptococcus pneumoniae Antibodies, IgG             Component  Ref Range & Units 6 mo ago 9 mo ago   Ochoa Binding Lectin  >=76 ng/mL 55 Low  51 Low  CM   Comment: INTERPRETIVE INFORMATION: Mannose Binding Lectin          COAG:   Recent Labs     05/04/24  1802   INR 1.2*       ABO:   Recent Labs     01/02/25  0828   ABO A       HEME/ENDO:  Recent Labs     12/24/24  2325 09/03/24  1139 04/22/24  0839 12/20/23  0913 07/21/22  1727 04/29/22  1040   TSH 3.74 0.91  --   1.54   < >  --    HGBA1C  --   --  5.2  --   --  5.4    < > = values in this interval not displayed.     Allergy skin tests-reviewed and scanned.    Assessment/Plan   Allergy test are negative to cat, dog, dust mite, feather, cockroach, molds and pollens.   History of recurrent pregnancy loss.  Patient had good response to Pneumovax with slightly decreased patrick binding lectin.  Prolonged cough (2 months) with productive mucous.  Will give antibiotic and anti fungal on hand in case of vaginal yeast. Patient to call if not improved.  Follow up 6-12 months planned.    Valeri Newton DO

## 2025-02-06 ENCOUNTER — PATIENT MESSAGE (OUTPATIENT)
Dept: ENDOCRINOLOGY | Facility: CLINIC | Age: 45
End: 2025-02-06

## 2025-02-06 DIAGNOSIS — O02.1 MISSED ABORTION (HHS-HCC): ICD-10-CM

## 2025-02-06 NOTE — PROGRESS NOTES
Patient going to  lab for HCG after IPAS to trend to negative.  Patient had IPAS on 1/2 after missed AB was diagnosed on 12/30.   Pregnancy was achieved by FET on 11/27 with donor egg embryo.     Patient had FUV with Dr. Chong on 1/14 and plan will be to get repeat hysteroscopy after bhcg is at negative.   Lupron +Letrozole x 2 months prior to FET.     Will call patient with results and plan.    Jennifer Christian 02/06/25 7:42 AM        Order is not in process. Message sent to patient to confirm she was planning to go to a lab today to trend bhcg to negative.   Will place in noon [facility] for tomorrow.   Jennifer Christian 02/06/25 2:09 PM

## 2025-02-07 NOTE — PROGRESS NOTES
Patient going to  lab for HCG after IPAS to trend to negative.  Patient had IPAS on 1/2 after missed AB was diagnosed on 12/30.   Pregnancy was achieved by FET on 11/27 with donor egg embryo.      Patient had FUV with Dr. Chong on 1/14 and plan will be to get repeat hysteroscopy after bhcg is at negative.   Lupron +Letrozole x 2 months prior to FET.      Will call patient with results and plan.    Jennifer Christian 02/07/25 7:27 AM            Called and LVM with patient to see if she was planning to go to lab today for bhcg. Mychart was sent yesterday as well.   Jennifer Christian 02/07/25 12:55 PM

## 2025-02-10 NOTE — PROGRESS NOTES
Patient going to  lab for HCG after IPAS to trend to negative. IUP was confirmed.  Patient had IPAS on 1/2 after missed AB was diagnosed on 12/30.   Pregnancy was achieved by FET on 11/27 with donor egg embryo.      Patient had FUV with Dr. Chong on 1/14 and plan will be to get repeat hysteroscopy after bhcg is at negative.   Lupron +Letrozole x 2 months prior to FET.      Will call patient with results and plan.  Jennifer Christian 02/10/25 8:02 AM        LVM with patient to see if she is planning to go to outpatient lab for repeat bhcg to trend to negative.   Jennifer Christian 02/10/25 3:12 PM

## 2025-02-11 ENCOUNTER — TELEPHONE (OUTPATIENT)
Dept: ENDOCRINOLOGY | Facility: CLINIC | Age: 45
End: 2025-02-11
Payer: COMMERCIAL

## 2025-02-11 NOTE — TELEPHONE ENCOUNTER
Message sent to patient that we will look out for results and contact her with the plan.     Jennifer Christian 02/11/25 1:33 PM

## 2025-02-12 LAB
B-HCG SERPL-ACNC: <5 MIU/ML
ERYTHROCYTE [DISTWIDTH] IN BLOOD BY AUTOMATED COUNT: 12.1 % (ref 11–15)
HCT VFR BLD AUTO: 44.4 % (ref 35–45)
HGB BLD-MCNC: 14.6 G/DL (ref 11.7–15.5)
MCH RBC QN AUTO: 30.7 PG (ref 27–33)
MCHC RBC AUTO-ENTMCNC: 32.9 G/DL (ref 32–36)
MCV RBC AUTO: 93.5 FL (ref 80–100)
PLATELET # BLD AUTO: 308 THOUSAND/UL (ref 140–400)
PMV BLD REES-ECKER: 10.4 FL (ref 7.5–12.5)
RBC # BLD AUTO: 4.75 MILLION/UL (ref 3.8–5.1)
WBC # BLD AUTO: 7.4 THOUSAND/UL (ref 3.8–10.8)

## 2025-02-12 NOTE — PROGRESS NOTES
Patient presented to  lab yesterday for HCG after IPAS to trend to negative.     Patient had IPAS on 1/2 after missed AB was diagnosed on 12/30.   Pregnancy was achieved by FET on 11/27 with donor egg embryo.     Component      Latest Ref Rng 1/2/2025 1/9/2025 2/11/2025   HCG, Beta-Quantitative      <5 mIU/mL 27,130 (H)  328 (H)     HCG, TOTAL, QN      mIU/mL   <5      Plan after negative HCG:   Patient had FUV with Dr. Chong on 1/14 and plan will be to get repeat hysteroscopy.   Lupron +Letrozole x 2 months prior to FET.       02/12/25 at 8:01 AM - Raffaele Wilks RN     [facility] PROVIDER NOTE - HCG REVIEW  Ultrasound and/or labs reviewed at [facility].     Results for orders placed or performed in visit on 02/11/25 (from the past 96 hours)   CBC   Result Value Ref Range    WHITE BLOOD CELL COUNT 7.4 3.8 - 10.8 Thousand/uL    RED BLOOD CELL COUNT 4.75 3.80 - 5.10 Million/uL    HEMOGLOBIN 14.6 11.7 - 15.5 g/dL    HEMATOCRIT 44.4 35.0 - 45.0 %    MCV 93.5 80.0 - 100.0 fL    MCH 30.7 27.0 - 33.0 pg    MCHC 32.9 32.0 - 36.0 g/dL    RDW 12.1 11.0 - 15.0 %    PLATELET COUNT 308 140 - 400 Thousand/uL    MPV 10.4 7.5 - 12.5 fL   [facility] HCG, TOTAL, QN   Result Value Ref Range    HCG, TOTAL, QN <5 mIU/mL     *Note: Due to a large number of results and/or encounters for the requested time period, some results have not been displayed. A complete set of results can be found in Results Review.       Lab Results   Component Value Date    HCGQUANT <5 02/11/2025    HCGQUANT 328 (H) 01/09/2025    HCGQUANT 27,130 (H) 01/02/2025    HCGQUANT 24,422 (H) 12/24/2024    HCGQUANT 10,943 (H) 12/16/2024    HCGQUANT 1,001 (H) 12/09/2024    HCGQUANT <3 09/09/2024    HCGQUANT <3 09/03/2024     No further labs needed.   Joellen Onofre  02/12/2025  11:37 AM    MyChart sent to patient with MD plan and next steps.       02/12/25 at 12:48 PM - Raffaele Wilks RN

## 2025-02-13 ENCOUNTER — APPOINTMENT (OUTPATIENT)
Dept: ENDOCRINOLOGY | Facility: CLINIC | Age: 45
End: 2025-02-13
Payer: COMMERCIAL

## 2025-02-25 DIAGNOSIS — E03.9 HYPOTHYROIDISM, UNSPECIFIED TYPE: ICD-10-CM

## 2025-02-25 RX ORDER — LEVOTHYROXINE SODIUM 75 UG/1
75 TABLET ORAL DAILY
Qty: 30 TABLET | Refills: 1 | Status: SHIPPED | OUTPATIENT
Start: 2025-02-25 | End: 2026-02-25

## 2025-04-15 ENCOUNTER — TELEPHONE (OUTPATIENT)
Dept: ENDOCRINOLOGY | Facility: CLINIC | Age: 45
End: 2025-04-15
Payer: COMMERCIAL

## 2025-04-15 NOTE — TELEPHONE ENCOUNTER
Returned patient's call. Patient is calling for next steps. She is s/p MAN following donor egg FET. She did trend bhcg down to negative in February.  Plan per Dr. Chong's consult note was to have repeat hysteroscopy 4 weeks after negative bhcg prior to FET.   She is recommended to be on Lupron and Letrozole suppression x 2 months prior to transfer again. Patient does not get periods on her own. Message sent to Dr. Chong to confirm plan if patient should have lab work completed to time out suppression start. Patient aware we will contact her with plan.     Jennifer Christian 04/15/25 3:51 PM

## 2025-04-17 DIAGNOSIS — N97.9 FEMALE INFERTILITY: ICD-10-CM

## 2025-04-18 ENCOUNTER — DOCUMENTATION (OUTPATIENT)
Dept: ENDOCRINOLOGY | Facility: CLINIC | Age: 45
End: 2025-04-18
Payer: COMMERCIAL

## 2025-04-18 ENCOUNTER — PATIENT MESSAGE (OUTPATIENT)
Dept: ENDOCRINOLOGY | Facility: CLINIC | Age: 45
End: 2025-04-18
Payer: COMMERCIAL

## 2025-04-18 RX ORDER — LETROZOLE 2.5 MG/1
5 TABLET, FILM COATED ORAL DAILY
Qty: 60 TABLET | Refills: 2 | Status: SHIPPED | OUTPATIENT
Start: 2025-04-18 | End: 2025-07-17

## 2025-04-18 RX ORDER — MEDROXYPROGESTERONE ACETATE 10 MG/1
10 TABLET ORAL DAILY
Qty: 10 TABLET | Refills: 0 | Status: SHIPPED | OUTPATIENT
Start: 2025-04-18 | End: 2026-04-18

## 2025-04-18 RX ORDER — LEUPROLIDE ACETATE 3.75 MG
3.75 KIT INTRAMUSCULAR
Qty: 1 KIT | Refills: 2 | Status: SHIPPED | OUTPATIENT
Start: 2025-04-18 | End: 2026-04-18

## 2025-04-18 NOTE — PROGRESS NOTES
MD Jennifer Swartz, NEGAR; [facility] Clinical Support Staff  Yes can get hysteroscopy any time and can do same plan to start suppression     ----- Message -----  From: Jennifer Christian RN  Sent: 4/15/2025   3:40 PM EDT  To: Jaymie Chong MD; *  Subject: Suppression start                                Hi Dr. Chong,  This patient took a few months off after recent MAB from donor egg transfer.  She is planning to proceed with another transfer and is aware she needs a hysteroscopy prior to. She does not get periods anymore, and wondering if she can schedule at any time?    Also, would you like to check lab work for her in order to proceed with suppression protocol? (Lupron and letrozole x 2 months prior) If so, what lab work would you like, just a p4? I believe last time she did this the plan was if p4 was negative to start Provera 10 mg daily  x 7 days and start Lupron and Letrozole same time

## 2025-04-29 ENCOUNTER — LAB REQUISITION (OUTPATIENT)
Dept: LAB | Facility: HOSPITAL | Age: 45
End: 2025-04-29
Payer: COMMERCIAL

## 2025-04-29 DIAGNOSIS — O03.9 COMPLETE OR UNSPECIFIED SPONTANEOUS ABORTION WITHOUT COMPLICATION: ICD-10-CM

## 2025-04-29 DIAGNOSIS — E03.9 HYPOTHYROIDISM, UNSPECIFIED TYPE: ICD-10-CM

## 2025-04-29 LAB
B-HCG SERPL-ACNC: 2 MIU/ML
PROGEST SERPL-MCNC: 0.2 NG/ML

## 2025-04-29 PROCEDURE — 84702 CHORIONIC GONADOTROPIN TEST: CPT

## 2025-04-29 PROCEDURE — 84144 ASSAY OF PROGESTERONE: CPT

## 2025-04-29 RX ORDER — LEVOTHYROXINE SODIUM 75 UG/1
75 TABLET ORAL DAILY
Qty: 30 TABLET | Refills: 1 | Status: SHIPPED | OUTPATIENT
Start: 2025-04-29 | End: 2026-04-29

## 2025-04-29 NOTE — PROGRESS NOTES
Patient going to lab today for HCG and P4 level   If p4 was negative to start Provera 10 mg daily  x 7 days and start Lupron (schedule injection for next day) and Letrozole same time.  Will call patient with results and plan.    Saira Alexandre 04/29/25 8:27 AM    [facility] PROVIDER NOTE - HCG REVIEW  Ultrasound and/or labs reviewed at [facility].     Results for orders placed or performed in visit on 04/29/25 (from the past 96 hours)   Human Chorionic Gonadotropin, Serum Quantitative   Result Value Ref Range    HCG, Beta-Quantitative 2 <5 mIU/mL   Progesterone   Result Value Ref Range    Progesterone 0.2 ng/mL       Lab Results   Component Value Date    HCGQUANT 2 04/29/2025    HCGQUANT <5 02/11/2025    HCGQUANT 328 (H) 01/09/2025    HCGQUANT 27,130 (H) 01/02/2025    HCGQUANT 24,422 (H) 12/24/2024    HCGQUANT 10,943 (H) 12/16/2024    HCGQUANT 1,001 (H) 12/09/2024    HCGQUANT <3 09/09/2024       Reviewed HCG. Plan on provera 10mg x 10 days.   Joellen Onofre  04/29/2025  4:00 PM    Received message from pharmacy team that patient's lupron PA was denied.   Plan was for patient to be scheduled for lupron injection tomorrow and start letrozole and provera.  Pharmacy team will submit appeal.   Per Dr. Chambers ok for patient to recheck labs once/ if PA is approved to then start provera with endometrial suppression.  Called patient with plan, if appeal still denied will reach out to Dr. Chong.     Saira Alexandre 04/29/25 4:49 PM

## 2025-04-30 ENCOUNTER — DOCUMENTATION (OUTPATIENT)
Dept: ENDOCRINOLOGY | Facility: CLINIC | Age: 45
End: 2025-04-30
Payer: COMMERCIAL

## 2025-04-30 DIAGNOSIS — Z13.29 SCREENING FOR THYROID DISORDER: Primary | ICD-10-CM

## 2025-04-30 LAB
T4 FREE SERPL-MCNC: 1.5 NG/DL (ref 0.8–1.8)
TSH SERPL-ACNC: 0.03 MIU/L

## 2025-04-30 RX ORDER — LEVOTHYROXINE SODIUM 50 UG/1
50 TABLET ORAL DAILY
Qty: 30 TABLET | Refills: 2 | Status: SHIPPED | OUTPATIENT
Start: 2025-04-30 | End: 2026-04-30

## 2025-04-30 NOTE — PROGRESS NOTES
Reviewed TSH level with Dr. Chong. Plan to decrease synthroid from 75mcg to 50mcg and plan on rechecking in 4 weeks. RN will communicate.  Joellen Onofre 04/30/25 3:02 PM    Called patient with plan to decrease synthroid to 50mcg and repeat in four weeks. Patient out of town at 4 week camila, will check on 6/2, will add patient to noon huddle to review lab. Orders placed for repeat tsh and for decreased dose.   SATISH PRICE on 4/30/25 at 3:24 PM.

## 2025-05-01 ENCOUNTER — SPECIALTY PHARMACY (OUTPATIENT)
Dept: PHARMACY | Facility: CLINIC | Age: 45
End: 2025-05-01

## 2025-05-02 DIAGNOSIS — N97.9 FEMALE INFERTILITY: ICD-10-CM

## 2025-05-02 PROCEDURE — RXMED WILLOW AMBULATORY MEDICATION CHARGE

## 2025-05-02 NOTE — PROGRESS NOTES
Requested Prescriptions     Signed Prescriptions Disp Refills    elagolix (Orilissa) tablet 56 tablet 1     Sig: Take 1 tablet (200 mg) by mouth 2 times a day.     Sent in Orilissa 200mg BID for Endosuppression. Waiting on appeal outcome for coverage of medication.       Lissett Kam (Katie), PharmD  [facility]  Clinical Pharmacy Specialist- Fertility   [facility], [place] [address]  Email: [email or web address]  Tel: [phone]       Fax: [phone]

## 2025-05-08 DIAGNOSIS — Z01.812 ENCOUNTER FOR PREPROCEDURAL LABORATORY EXAMINATION: ICD-10-CM

## 2025-05-08 DIAGNOSIS — N97.9 FEMALE INFERTILITY: ICD-10-CM

## 2025-05-09 ENCOUNTER — SPECIALTY PHARMACY (OUTPATIENT)
Dept: PHARMACY | Facility: CLINIC | Age: 45
End: 2025-05-09

## 2025-05-09 ENCOUNTER — PHARMACY VISIT (OUTPATIENT)
Dept: PHARMACY | Facility: CLINIC | Age: 45
End: 2025-05-09
Payer: MEDICARE

## 2025-05-09 NOTE — PROGRESS NOTES
[facility] Clinical Note  Initial Patient Education- Fertility, Endosuppression    Introduction  Jocelyn Dee is a 45 y.o. female who is on the specialty pharmacy service for management of: Fertility Core.    Jocelyn Dee is initiating the following therapy. Dose and directions will be documented below for each medication.   Orilissa 200mg tablet    Medication receipt date: 5/9/25  Duration of therapy: 2 months    The most recent encounter visit with the referring prescriber Dr. Chong on 1/14/25 was reviewed.  Pharmacy will continue to collaborate in the care of this patient with the referring prescriber.    Medications should only be prescribed by fertility specialists for this indication.    Clinical Background  An initial assessment was conducted prior to first fill of the medication to determine the appropriateness of therapy given the patient's diagnosis, medication list, comorbidities, allergies, medical history, patient's ability to self administer medication, and therapeutic goals based on possible outcomes of therapy. Refer to initial assessment task completed on 5/9/25.    Labs for clinical appropriateness that were reviewed include:   N/A no labs needing to be reviewed, will start endosuppression with start of menses leading into FET     Education/Discussion  Jocelyn was contacted on 5/9/2025 at 12:10 PM for a pharmacy visit with encounter number [phone] from:   [facility]  [address] [place] [id number]  Dept: [phone]  Dept Fax: [phone]  Jocelyn consented to a/an In person visit, which was performed.      Medication Start Date (planned or actual): TBD- need to confirm dates  Education was conducted prior to start of therapy? Yes    Education discussed includes the following:  Patient Education  Counseled the Patient on the Following : Theraputic rationale and expected outcomes, Expected duration of therapy,  Doses and administration, Adherence and missed doses, Lab monitoring and follow-up, Possible side effects and management, Possible drug interactions, Safe handling, storage, and disposal, Contraindications and precautions, Pharmacy contact information, Cost of medications  Learner: Patient  Education Method: Explanation  Education Response: Verbalizes understanding  Additional details of the medication specific counseling are found within the linked patient education flowsheet.     The follow up timeline was discussed. Every person responds and reacts to therapy differently. Take as directed by your provider.  Adverse effects or efficacy to treatment can occur at any point during therapy. Recommended contacting the  Fertility Center after starting therapy and sooner if symptoms worsen or new symptoms develop.   Patient will be assessed for efficacy and tolerability in approximately: other - 1 month    Patient was encouraged to reach out to their doctor's office if they develop signs of an allergic reaction to any medication prescribed as part of their fertility treatment.    Provided education on goals and possible outcomes of therapy:  Suppress endometriosis and associated symptoms (dysmenorrhea and nonmenstrual pelvic pain) prior to embryo transfer. *Orilissa*    The importance of adherence was discussed and they were advised to take the medication as prescribed by their provider.     Patient is being seen by the  Fertility Clinic and is undergoing treatment with Endosuppression prior to a Frozen Embryo Transfer.     [facility]  Patient Management Program- Initial Patient Education:  Orilissa (Elagolix)    Clinical Intervention: Orilissa 200mg Tablet Initial Patient Education    Indication:  Orilissa is a gonadotropin- releasing hormone antagonist that works on the pituitary to suppress LH, FSH, and estradiol. In patients with endometriosis, these actions reduce dysmenorrhea and  non-menstrual pelvic pain.     Dose:   Take 1 tablet (200mg) twice daily for 2 months starting with menses.     Administration:   Administer at the same time(s) each day, with or without food.    Missed Doses:  Importance of adherence discussed with patient and they were advised to use a calendar to keep track of doses. If a dose is missed, the patient should take it as soon as they remember. If it is close to the time for your next dose, skip the missed dose and go back to your normal time. Do not take 2 doses at the same time or extra doses.    Warnings, Precautions, and Adverse Reactions:   - Discussed common adverse effects, warnings and precautions pertinent to the medication including but not limited to:  hot flashes, nausea/upset stomach, headache, amenorrhea (dose-dependent), mood changes, joint aches (5%).        Handling and Storage   Store at room temperature (36°F to 86°F).    Reproductive Considerations   Evaluate pregnancy status prior to use. This medication is contraindicated in pregnancy.    Disposal:  Unused or  tablets should be disposed of in special ways to ensure that pets, children, and other people cannot consume them either via regular trash or through a drug take-back program.  Do not flush this medication down the toilet.     Impression/Plan  Review and Assessment   Reviewed During This Encounter: Allergies, Medications, Problem list  Medications Assessed for Appropriate Use, Dose, Route, Frequency, and Duration: Yes  Medication Reconciliation Completed: Yes  Drug Interactions Evaluated: Yes  Clinically Relevant Drug Interactions Identified: No    This patient has not been identified as high risk.  The following action was taken: N/A.    QOL/Patient Satisfaction  Rate your quality of life on scale of 1-10: 10 - Completely satisfied  Rate your satisfaction with  Specialty Pharmacy on scale of 1-10: 10 - Completely satisfied    Provided contact information ([phone]) for  [facility] and reviewed dispensing process, refill timeline, and patient management follow up. Advised to contact the pharmacy if there are any adverse effects and/or changes to medication list, including prescriptions, OTC medications, herbal products, or supplements. Confirmed understanding of education conducted during assessment. All questions and concerns were addressed and patient was encouraged to reach out for additional questions or concerns.      Below is the link for the [facility] Welcome Packet for your reference:    [email or web address]          Lissett Kam (Katie), PharmD  [facility]  Clinical Pharmacy Specialist- Fertility   [facility], [place]  [address] [place] [address]  Email: [email or web address]  Tel: [phone]       Fax: [phone]

## 2025-05-12 ENCOUNTER — LAB REQUISITION (OUTPATIENT)
Dept: LAB | Facility: HOSPITAL | Age: 45
End: 2025-05-12
Payer: COMMERCIAL

## 2025-05-12 DIAGNOSIS — N97.9 FEMALE INFERTILITY, UNSPECIFIED: ICD-10-CM

## 2025-05-12 DIAGNOSIS — N97.9 FEMALE INFERTILITY: ICD-10-CM

## 2025-05-12 DIAGNOSIS — Z01.812 ENCOUNTER FOR PREPROCEDURAL LABORATORY EXAMINATION: ICD-10-CM

## 2025-05-12 DIAGNOSIS — Z13.1 SCREENING FOR DIABETES MELLITUS: ICD-10-CM

## 2025-05-12 DIAGNOSIS — Z31.83 ENCOUNTER FOR ASSISTED REPRODUCTIVE FERTILITY CYCLE: ICD-10-CM

## 2025-05-12 LAB
B-HCG SERPL-ACNC: 2 MIU/ML
ESTRADIOL SERPL-MCNC: <20 PG/ML
PROGEST SERPL-MCNC: 0.2 NG/ML

## 2025-05-12 PROCEDURE — 84702 CHORIONIC GONADOTROPIN TEST: CPT

## 2025-05-12 PROCEDURE — 84144 ASSAY OF PROGESTERONE: CPT

## 2025-05-12 PROCEDURE — 82670 ASSAY OF TOTAL ESTRADIOL: CPT

## 2025-05-12 NOTE — PROGRESS NOTES
Patient going to outpatient lab today for p4, e2 and bhcg to confirm start for endometriosis suppression x2 months before FET.     Jaymie Chong MD to [id number] Clinical Support Staff  Me    5/8/25 10:31 AM  Yes can do Orilissa rather than Lupron  Can check labs and start all right away if E2/P4 low      Will contact patient with results and plan.     Jennifer Christian 05/12/25 9:20 AM    [facility]ONE [facility]  Ultrasound and/or labs reviewed at [facility].   Results for orders placed or performed in visit on 05/12/25 (from the past 96 hours)   Estradiol   Result Value Ref Range    Estradiol <20 pg/mL   Progesterone   Result Value Ref Range    Progesterone 0.2 ng/mL   Human Chorionic Gonadotropin, Serum Quantitative   Result Value Ref Range    HCG, Beta-Quantitative 2 <5 mIU/mL     *Note: Due to a large number of results and/or encounters for the requested time period, some results have not been displayed. A complete set of results can be found in Results Review.       Ovulatory progesterone  no    Next steps: Ok to start suppression protocol as planned.   Joellen MCKENNA Kingston  05/12/2025  3:39 PM        Component      Latest Ref Rng 5/12/2025   Estradiol      pg/mL <20    Progesterone      ng/mL 0.2    HCG, Beta-Quantitative      <5 mIU/mL 2      LVM and sent [employer] message to patient with plan to start Orilissa and Letrozole suppression x 2 months tomorrow. Plan for FET around July. See [employer] message for more detail.   Jennifer Christian 05/12/25 3:28 PM

## 2025-05-12 NOTE — PROGRESS NOTES
Boarding Pass Interval FET Checklist    Age: 45 y.o.    Provider: Jaymie Chong MD  Primary RN:   Reasons for Treatment: Primary Ovarian Insufficiency, RPL  Last BMI  25 : 27.57 kg/m²       Medical History[1]    1. -ETOP age 33  2. -SAB, 1st trimester, missed ab. Conceived easily. Passed with cytotec. Age 37  -Both of the above with different partners  3. 2024- missed AB following donor egg FET; D&C, ANORA results demonstrated normal chromosomes    Date Done Consultation Results/Comments   2025 Medication Protocol Fertility Plan Update:   2 months of endo suppression with Orilissa and Letrozole     Programmed FET Estrace patches, IM P4 100 mg+ vaginal prometrium 200 mg BID (starting 1 day after IM P4)  Prednisone  10 mg to start with STIM START  Stay on Plaquenil per rheumatology recommendations  Baby ASA and Lovenox 40 mg subcutaneous daily to start with transfer   7/10/2025 Authorization to Share [x]      2025 FET Treatment Plan Packet- ID REQ (Every cycle) Received and in chart: Yes (Jennifer Christian RN)   2024 IVF Information and Authorization - [employer]/offsite Storage (ID REQ) (Yearly) Received and in chart: Yes (Phyllis Arias RN)   2024 [employer] Embryo- Couple or Single Packet (Yearly) Received and in chart: Yes (Jennifer Christian RN)   2025 [facility] Waiver (In) Form Received and in chart: Yes (Jennifer Christian RN)   2025 Embryo Ship In 1 Embryo ship in;    ID#   Embryo #1 [facility]      2025 Procedure Order Placed [x]   2023 MFM Consult Okay to proceed? Yes    Psych Consult Okay to proceed? Yes - patient met with Dr. Hartley 3/14/2024:     Focus of treatment:   Modality of treatment: CBT  Frequency of treatment: every 2-4 weeks     Diagnosis: adjustment disorder     Treatment plan: cbt     Symptoms: hot flashes, anxiety, rumination     Prognosis: very good     Progress to date: First session since consult she has POI and is perimenopausal and  having symptoms of menopause, and anxious about infertility and treatments along with worries about autoimmune disorders.  Worked with her to reduce her all or nothing thinking and her negative hypothesis guided searches.  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 N/A    Other    Date Done Female Labs Results/Comments   2025 T&S (Q 1 Year) ABO: A  Rh: POS  Antibody: NEG     9/3/2024 Hep B sAg Nonreactive   9/3/2024 Hep C AB Nonreactive   9/3/2024 HIV Nonreactive   9/3/2024 Syphilis Nonreactive   9/3/2024 GC/CT GC: Negative  CT: Negative   2022 Rubella (Q 5 Years) Equivocal - MMR vaccine given 2022 Varicella (Q 5 Years) POSITIVE   6/10/2025 TSH 1.40 (Ref range: mIU/L)   6/10/2025 HgbA1C 5.6 (Ref range: <5.7 %)    Uterine Cavity Eval HS2024  Uterus:  normal contour without filling defects  Tubes:  bilateral patency with free spill of dye, normal tubal architecture noted, and no loculations present.  Based on these findings, my recommendation is: No further follow up required.      Biopsy results 2024:  Normal secretory endometrium, resection of endometrium performed  BCL6: Positive for over expression  : Negative for endometritis       Hysteroscopy 2025:   Findings:   Cavity: Smooth cavity no lesions noted  Ostia: Bilateral tubal ostia visualized  Additional Notes: chronic endometritis vs atrophy from orlissa. Given clinical hx with +CE in the past and recent IPAS will send 14d doxy         2024 Pap Smear Negative for Intraepithelial lesion or malignancy  Endometrial cells are present   The presence of endometrial cells is reported at age 45 or older.  Their presence, particularly out of phase or after menopause,   may be associated with benign endometrium, hormonal alterations and less commonly, endometrial/uterine abnormalities.  Clinical correlation is  recommended.     HPV: Negative    6/23/2025 Mammogram ( > 40) IMPRESSION:  No mammographic evidence of malignancy.      BI-RADS CATEGORY:  BI-RADS Category:  1 Negative.  Recommendation:  Annual Screening.  Recommended Date:  1 Year.  Laterality:  Bilateral.                Date Done Miscellaneous Results/Comments   N/A BMI Checklist  BMI > 40 or < 18 Added to chart:       >= 45 Checklist  Added to chart:   Yes; Boarding Pass 45 and Older Checklist    Date Done Testing Results   2/11/2025 CBC Plt: 308 (Ref range: 140 - 400 Thousand/uL)  Hct: 44.4 (Ref range: 35.0 - 45.0 %)   1/2/2025 CMP BUN: 9 (Ref range: 6 - 23 mg/dL)  Cre: 0.59 (Ref range: 0.50 - 1.05 mg/dL)  AST: 22 (Ref range: 9 - 39 U/L)  ALT: 20 (Ref range: 7 - 45 U/L)   5/16/2025 Lipid Panel Cholesterol: 252 (H; Ref range: <200 mg/dL)  HDL: 77  Cholesterol/HDL Ratio: 3.3  LDL: 162 (H; Ref range: mg/dL (calc))  VLDL: No results found for requested labs within last 365 days.  Triglycerides: 44 (Ref range: <150 mg/dL)  Non HDL Cholesterol: 175 (H; Ref range: <130 mg/dL (calc))   6/10/2025 HgbA1C 5.6 (Ref range: <5.7 %)   6/10/2025 TSH (with reflex to T4) TSH: 1.40 (Ref range: mIU/L)  T4: 1.1 (Ref range: 0.8 - 1.8 ng/dL)   6/23/2025 EKG Sinus rhythm   Vertical axis   Normal variant of ECG      N/A Echo 6/20/2025 11:52 AM  Clara Leo RN   I messaged Dr. Kumar about this patient today & recommendations for an echo. Per Dr. Kumar: We generally don't get echocardiograms for age 45 (or older) unless they have other risk factors.    6/20/2025 12:26 PM  Clara Leo RN  If she doesn't have any other risk factors at this time - it is not recommended. Thanks    6/23/2025 Mammogram ( > 40) (Q 1 Year) IMPRESSION:  No mammographic evidence of malignancy.      BI-RADS CATEGORY:  BI-RADS Category:  1 Negative.  Recommendation:  Annual Screening.  Recommended Date:  1 Year.  Laterality:  Bilateral.      [facility] Clearance Clearance Letter-Provider Reviewed:   Patient  would like to follow them along in pregnancy, but does not need to be seen prior to.    7/3/2025 PCP/Internal Medicine Clearance (if required) Clearance Letter-Provider Reviewed: Yes    To Whom It May Concern,     Jocelyn Dee is an established patient with this practice. She recently had an EKG which was normal. She also had a mammogram that was normal. She is in good health and is be able to receive fertility treatments.      Please please feel free to contact this office if you need further information regarding this patient.     Sincerely,           Chanda Addison MD      7/1/2025 Colonoscopy Dear Dr. Jaymie Chong MD:     Thank you for referring Jocelyn Dee to me for evaluation.      Her colonoscopy was completed with excellent prep and no difficulty. The polyp removed was small and appearance was benign. Although final pathology is pending, I do not anticipate she will require sooner surveillance than 7 years. She is at acceptable risk to proceed with further fertility treatments from a gastrointestinal standpoint.       Please do not hesitate to contact with me with a any further questions or concerns.      Sincerely,  Geraldine Ansari MD    Pathology-  A.  POLYP (TRANSVERSE COLON), EXCISIONAL BIOPSIES:              - ADENOMATOUS POLYP (TUBULAR ADENOMA).     B.  COLON, RANDOM BIOPSIES:              - NO SIGNIFICANT HISTOPATHOLOGIC CHANGES; NEGATIVE FOR COLITIS AND DYSPLASIA.              - GRANULOMAS NOT PRESENT IN SUBMITTED MATERIAL.    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  [ X ] Other: Anonymous Egg Donor was  negative for this genetic condition and patient is cleared to move forward.   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  After we hear final clearance from GI she can proceed  Jaymie Chong 07/10/25 3:18 PM           [1]   Past Medical History:  Diagnosis Date    Papillomavirus as the cause of diseases classified elsewhere     HPV in female    Personal history of other complications of pregnancy, childbirth and the puerperium     History of spontaneous     Personal history of other diseases of the female genital tract     History of infertility    Personal history of other infectious and parasitic diseases     History of infection due to human papilloma virus (HPV)

## 2025-05-13 ENCOUNTER — APPOINTMENT (OUTPATIENT)
Dept: ALLERGY | Facility: CLINIC | Age: 45
End: 2025-05-13
Payer: COMMERCIAL

## 2025-05-13 LAB
HOLD SPECIMEN: NORMAL
HOLD SPECIMEN: NORMAL

## 2025-05-16 ENCOUNTER — TELEPHONE (OUTPATIENT)
Dept: PRIMARY CARE | Facility: CLINIC | Age: 45
End: 2025-05-16

## 2025-05-16 ENCOUNTER — APPOINTMENT (OUTPATIENT)
Dept: PRIMARY CARE | Facility: CLINIC | Age: 45
End: 2025-05-16
Payer: COMMERCIAL

## 2025-05-16 VITALS
BODY MASS INDEX: 27.57 KG/M2 | WEIGHT: 160.6 LBS | DIASTOLIC BLOOD PRESSURE: 79 MMHG | OXYGEN SATURATION: 95 % | SYSTOLIC BLOOD PRESSURE: 116 MMHG | HEART RATE: 72 BPM | TEMPERATURE: 98.6 F

## 2025-05-16 DIAGNOSIS — J06.9 RECURRENT UPPER RESPIRATORY INFECTION (URI): ICD-10-CM

## 2025-05-16 DIAGNOSIS — E28.319 EARLY MENOPAUSE: ICD-10-CM

## 2025-05-16 DIAGNOSIS — F40.243 ANXIETY WITH FLYING: ICD-10-CM

## 2025-05-16 DIAGNOSIS — R76.8 ANA POSITIVE: ICD-10-CM

## 2025-05-16 DIAGNOSIS — Z12.11 ENCOUNTER FOR SCREENING COLONOSCOPY: ICD-10-CM

## 2025-05-16 DIAGNOSIS — E03.9 HYPOTHYROIDISM, UNSPECIFIED TYPE: ICD-10-CM

## 2025-05-16 DIAGNOSIS — K90.0 CELIAC DISEASE (HHS-HCC): ICD-10-CM

## 2025-05-16 DIAGNOSIS — M35.9 UNDIFFERENTIATED CONNECTIVE TISSUE DISEASE (MULTI): ICD-10-CM

## 2025-05-16 DIAGNOSIS — N97.9 FEMALE INFERTILITY: ICD-10-CM

## 2025-05-16 DIAGNOSIS — Z00.00 ANNUAL PHYSICAL EXAM: Primary | ICD-10-CM

## 2025-05-16 DIAGNOSIS — F41.9 ANXIETY: ICD-10-CM

## 2025-05-16 DIAGNOSIS — D89.89 AUTOIMMUNE DISORDER (MULTI): ICD-10-CM

## 2025-05-16 DIAGNOSIS — M25.50 ARTHRALGIA, UNSPECIFIED JOINT: ICD-10-CM

## 2025-05-16 DIAGNOSIS — Z12.31 VISIT FOR SCREENING MAMMOGRAM: ICD-10-CM

## 2025-05-16 DIAGNOSIS — Z12.11 COLON CANCER SCREENING: ICD-10-CM

## 2025-05-16 RX ORDER — LORAZEPAM 1 MG/1
TABLET ORAL
Qty: 6 TABLET | Refills: 0 | Status: SHIPPED | OUTPATIENT
Start: 2025-05-16

## 2025-05-16 RX ORDER — SERTRALINE HYDROCHLORIDE 50 MG/1
50 TABLET, FILM COATED ORAL DAILY
Qty: 90 TABLET | Refills: 3 | Status: SHIPPED | OUTPATIENT
Start: 2025-05-16 | End: 2025-05-16 | Stop reason: SDUPTHER

## 2025-05-16 RX ORDER — SERTRALINE HYDROCHLORIDE 50 MG/1
50 TABLET, FILM COATED ORAL DAILY
Qty: 90 TABLET | Refills: 3 | Status: SHIPPED | OUTPATIENT
Start: 2025-05-16 | End: 2026-05-11

## 2025-05-16 RX ORDER — CYCLOBENZAPRINE HCL 10 MG
10 TABLET ORAL NIGHTLY PRN
COMMUNITY
Start: 2025-05-05

## 2025-05-16 NOTE — PROGRESS NOTES
Subjective   Patient ID: Jocelyn Dee is a 45 y.o. female who presents for Autoimmune disease (Patient is here to discuss autoimmune disease. ).    Here for CPE.  She has hypothyroid ,anxiety, she also has several complaints has recurrent upper respiratory infection, she has been using Flonase nasal spray off-and-on , she continue to be seen by her rheumatologist for her aches and pain was told she had autoimmune disease, we did refer her to her endocrinologist Dr. Gamez.  She has been dealing with fertility issues she was pregnant last December but unfortunately had a miscarriage at 7 weeks.  She will be trying again soon.  She is overdue for colonoscopy and  mammogram.  She takes sertraline for anxiety and levothyroxine for hypothyroid.  She also needs a few tablet for alprazolam she has fear of flying and she is traveling soon need few tablets.  She is seeing a dermatologist for skin exam.  She has tingling of feet and hands.  She is on gluten-free diet.  She needs a referral to her gynecologist for general GYN exam.         Review of Systems   Constitutional: Negative.    HENT:          As HPI   Eyes: Negative.    Respiratory: Negative.     Cardiovascular: Negative.    Gastrointestinal:         HPI   Endocrine:        As HPI   Genitourinary:         As HPI   Musculoskeletal:         As HPI   Allergic/Immunologic: Negative.    Neurological:         As HPI   Hematological: Negative.    Psychiatric/Behavioral:          Has anxiety responded to sertraline , she needs referral for counseling.       Objective   /79 (BP Location: Right arm, Patient Position: Sitting, BP Cuff Size: Adult)   Pulse 72   Temp 37 °C (98.6 °F) (Temporal)   Wt 72.8 kg (160 lb 9.6 oz)   SpO2 95%   BMI 27.57 kg/m²     Physical Exam  Vitals reviewed.   Constitutional:       General: She is not in acute distress.     Appearance: Normal appearance.   HENT:      Head: Normocephalic and atraumatic.      Right Ear: Tympanic membrane,  ear canal and external ear normal.      Left Ear: Tympanic membrane, ear canal and external ear normal.      Nose:      Comments: No sinus tenderness to palpation     Mouth/Throat:      Mouth: Mucous membranes are moist.      Pharynx: No oropharyngeal exudate or posterior oropharyngeal erythema.   Eyes:      General: No scleral icterus.     Extraocular Movements: Extraocular movements intact.      Pupils: Pupils are equal, round, and reactive to light.   Neck:      Vascular: No carotid bruit.   Cardiovascular:      Rate and Rhythm: Normal rate and regular rhythm.      Pulses: Normal pulses.      Heart sounds: Normal heart sounds. No murmur heard.  Pulmonary:      Effort: Pulmonary effort is normal. No respiratory distress.      Breath sounds: Normal breath sounds. No wheezing or rales.   Abdominal:      General: Abdomen is flat. Bowel sounds are normal.      Palpations: Abdomen is soft.      Tenderness: There is no right CVA tenderness or left CVA tenderness.   Musculoskeletal:      Cervical back: Normal range of motion and neck supple.      Right lower leg: No edema.      Left lower leg: No edema.      Comments: Pain in left-sided neck and left upper back with palpation   Lymphadenopathy:      Cervical: No cervical adenopathy.   Skin:     General: Skin is warm.      Comments: Skin exam done by dermatologist   Neurological:      General: No focal deficit present.      Mental Status: She is alert and oriented to person, place, and time.   Psychiatric:         Mood and Affect: Mood normal.         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  6 tablets of Ativan where prescribed.         Relevant Medications    LORazepam (Ativan) 1 mg tablet    sertraline (Zoloft) 50 mg tablet    Early menopause E28.319    Take calcium and vitamin D had  DEXA scan last year.           Female infertility N97.9    Arthralgia M25.50    Undifferentiated connective tissue disease (Multi) M35.9    Patient seen by rheumatologist         Celiac disease ([facility]) K90.0    Follow gluten-free diet         Annual physical exam - Primary Z00.00    Relevant Orders    TSH with reflex to Free T4 if abnormal (Completed)    Lipid Panel (Completed)    Hypothyroidism E03.9    Will check thyroid ultrasound and thyroid antibody  Patient to see Dr. Levi, endocrinologist  Patient levothyroxine was adjusted will recheck another level 6 weeks from then.         Relevant Orders    Thyroid Peroxidase (TPO) Antibody (Completed)    Visit for screening mammogram Z12.31    Relevant Orders    BI mammo bilateral screening tomosynthesis    Encounter for screening colonoscopy Z12.11    Will order colonoscopy         Relevant Orders    Colonoscopy Screening; Average Risk Patient    Recurrent upper respiratory infection (URI) J06.9    Advised patient to use Flonase nasal spray regularly.  Refer to ENT for further evaluation.         Relevant Orders    US thyroid    Referral to ENT    Anxiety F41.9    On sertraline  Refer to Dr. Lea for counseling.  Follow-up in 6 months.         Relevant Medications    LORazepam (Ativan) 1 mg tablet    sertraline (Zoloft) 50 mg tablet    Autoimmune disorder (Multi) D89.89    Patient seen by rheumatologist

## 2025-05-16 NOTE — TELEPHONE ENCOUNTER
Pt advised she is supposed to have an order for thyroid ultrasound. Pt will call central scheduling later today to schedule. Thank youy

## 2025-05-17 PROBLEM — F41.9 ANXIETY: Status: ACTIVE | Noted: 2025-05-17

## 2025-05-17 PROBLEM — Z00.00 ANNUAL PHYSICAL EXAM: Status: ACTIVE | Noted: 2025-05-17

## 2025-05-17 PROBLEM — E03.9 HYPOTHYROIDISM: Status: ACTIVE | Noted: 2025-05-17

## 2025-05-17 PROBLEM — D89.89 AUTOIMMUNE DISORDER (MULTI): Status: ACTIVE | Noted: 2025-05-17

## 2025-05-17 PROBLEM — E03.9 HYPOTHYROIDISM: Status: ACTIVE | Noted: 2023-06-21

## 2025-05-17 PROBLEM — J06.9 RECURRENT UPPER RESPIRATORY INFECTION (URI): Status: ACTIVE | Noted: 2025-05-17

## 2025-05-17 PROBLEM — Z12.31 VISIT FOR SCREENING MAMMOGRAM: Status: ACTIVE | Noted: 2025-05-17

## 2025-05-17 PROBLEM — Z12.11 ENCOUNTER FOR SCREENING COLONOSCOPY: Status: ACTIVE | Noted: 2025-05-17

## 2025-05-17 LAB
CHOLEST SERPL-MCNC: 252 MG/DL
CHOLEST/HDLC SERPL: 3.3 (CALC)
HDLC SERPL-MCNC: 77 MG/DL
LDLC SERPL CALC-MCNC: 162 MG/DL (CALC)
NONHDLC SERPL-MCNC: 175 MG/DL (CALC)
T4 FREE SERPL-MCNC: 1.1 NG/DL (ref 0.8–1.8)
THYROPEROXIDASE AB SERPL-ACNC: NORMAL [IU]/ML
TRIGL SERPL-MCNC: 44 MG/DL
TSH SERPL-ACNC: 0.16 MIU/L

## 2025-05-17 ASSESSMENT — ENCOUNTER SYMPTOMS
RESPIRATORY NEGATIVE: 1
CARDIOVASCULAR NEGATIVE: 1
EYES NEGATIVE: 1
ROS GI COMMENTS: HPI
HEMATOLOGIC/LYMPHATIC NEGATIVE: 1
ALLERGIC/IMMUNOLOGIC NEGATIVE: 1
ENDOCRINE COMMENTS: AS HPI
CONSTITUTIONAL NEGATIVE: 1

## 2025-05-17 NOTE — ASSESSMENT & PLAN NOTE
Will check thyroid ultrasound and thyroid antibody  Patient to see Dr. Lvei, endocrinologist  Patient levothyroxine was adjusted will recheck another level 6 weeks from then.

## 2025-05-18 DIAGNOSIS — E03.9 HYPOTHYROIDISM, UNSPECIFIED TYPE: Primary | ICD-10-CM

## 2025-05-19 LAB
CHOLEST SERPL-MCNC: 252 MG/DL
CHOLEST/HDLC SERPL: 3.3 (CALC)
HDLC SERPL-MCNC: 77 MG/DL
LDLC SERPL CALC-MCNC: 162 MG/DL (CALC)
NONHDLC SERPL-MCNC: 175 MG/DL (CALC)
T4 FREE SERPL-MCNC: 1.1 NG/DL (ref 0.8–1.8)
THYROPEROXIDASE AB SERPL-ACNC: <1 IU/ML
TRIGL SERPL-MCNC: 44 MG/DL
TSH SERPL-ACNC: 0.16 MIU/L

## 2025-05-19 RX ORDER — SODIUM, POTASSIUM,MAG SULFATES 17.5-3.13G
SOLUTION, RECONSTITUTED, ORAL ORAL
Qty: 2 EACH | Refills: 0 | Status: SHIPPED | OUTPATIENT
Start: 2025-05-19

## 2025-05-22 ENCOUNTER — APPOINTMENT (OUTPATIENT)
Dept: RADIOLOGY | Facility: CLINIC | Age: 45
End: 2025-05-22
Payer: COMMERCIAL

## 2025-06-02 ENCOUNTER — TELEPHONE (OUTPATIENT)
Dept: ENDOCRINOLOGY | Facility: CLINIC | Age: 45
End: 2025-06-02

## 2025-06-02 DIAGNOSIS — Z13.29 SCREENING FOR THYROID DISORDER: ICD-10-CM

## 2025-06-02 NOTE — PROGRESS NOTES
Patient initially added to huddle today for 4 week repeat TSH after decrease of synthroid from 75mcg to 50mcg on 4/30 by Dr. Chong.  TSH at that time was 0.03.   Patient had TSH rechecked on 5/16 by PCP, level was 0.16 and plan is for her to repeat TSH level in 1 month with PCP.  Will review with provider.     Saira Alexandre 06/02/25 10:43 AM    Reviewed above. Ok for PCP to monitor and repeat in 1 month.  Joellen Onofre 06/02/25 11:15 AM

## 2025-06-02 NOTE — TELEPHONE ENCOUNTER
Patient calling regarding TSH level.  States Dr. Chong normally manages her TSH and synthroid.   Patient on huddle today for 1 month TSH review after decrease of synthroid from 75mcg to 50mcg on 4/30 by Dr. Chong, TSH at that time was 0.03.   Patient had TSH rechecked on 5/16 by PCP, level was 0.16 and plan is for her to repeat TSH level in 1 month with PCP.  Patient states her PCP ordered TSH level because patient was concerned, PCP does not usually manage her hypothyroid.   Patient's TPO antibody came back negative, PCP referred patient to endocrinologist and for thyroid US.  Patient wondering if she needs to see endo.   Discussed with patient that it would be best for her to see endo as recommended since her TSH levels have been abnormal even with synthroid.   Let patient know I can add her to noon [facility] for 6/16 (1 month follow up of TSH by PCP) for review of her TSH as she still wants Dr. Chong/our office following her TSH.     Saira Alexandre 06/02/25 2:16 PM

## 2025-06-02 NOTE — TELEPHONE ENCOUNTER
Reason for call:   Notes: Patient has been messaging with nurse Danielle and is wondering if a nurse could call her and answer some questions

## 2025-06-05 ENCOUNTER — PATIENT MESSAGE (OUTPATIENT)
Dept: ENDOCRINOLOGY | Facility: CLINIC | Age: 45
End: 2025-06-05
Payer: COMMERCIAL

## 2025-06-05 ENCOUNTER — PHARMACY VISIT (OUTPATIENT)
Dept: PHARMACY | Facility: CLINIC | Age: 45
End: 2025-06-05
Payer: MEDICARE

## 2025-06-05 ENCOUNTER — SPECIALTY PHARMACY (OUTPATIENT)
Dept: PHARMACY | Facility: CLINIC | Age: 45
End: 2025-06-05

## 2025-06-05 PROCEDURE — RXMED WILLOW AMBULATORY MEDICATION CHARGE

## 2025-06-05 NOTE — PROGRESS NOTES
[facility] Clinical Note  Patient Reassessment    Introduction  Jocelyn Dee is a 45 y.o. female who is on the specialty pharmacy service for management of: Fertility Core. Endosuppression prior to FET.      [facility] supplied medication:   Orilissa 200mg tablet     Duration of therapy:  8 weeks    The most recent encounter visit with the referring prescriber Joellen Onofre CNP on 5/12/25 was reviewed.  Pharmacy will continue to collaborate in the care of this patient with the referring prescriber.    Discussion  Jocelyn was contacted on 6/5/2025 at 3:17 PM for a pharmacy visit with encounter number [id number] from:   [facility]  [address] [place] [id number]  Dept: [phone]  Dept Fax: [phone]  Jocelyn consented to a/an Telephone visit, which was performed.    Efficacy  Patient has developed new symptoms of condition: No    Goals  Provided education on goals and possible outcomes of therapy:  Suppress endometriosis and associated symptoms (dysmenorrhea and nonmenstrual pelvic pain) prior to embryo transfer.     Progress: tracking well with goals of therapy. On medication until 7/13/25 and then will proceed with FET protocol    Tolerance  Patient has experienced side effects from this medication: Yes - experienced some GI upset in first few days of starting medication, now resolved.  Changes to current therapy regimen: No    The follow-up timeline was discussed. Every person responds to and reacts to therapy differently. Patient should be assessed for efficacy and tolerability in approximately: at therapy completion    Adherence  Patient Information  Informant: Self (Patient)  Demonstrates Understanding of Importance of Adherence: Yes  Does the patient have any barriers to self-administration (including physical and mental?): No  Barriers to Self-Administration: none  Action Taken to Mitigate Barriers for Self-Administration: none  taken  Support Network for Adherence: Healthcare Provider  Adherence Tools Used: Alarm  Medication Information  Medication:  (Orilissa 200mg tablet)  Patient Reported Missed Doses in the Last 4 Weeks: 2 (missed 2 evening doses in last 2 weeks due to travel for work)  Estimated Medication Adherence Level: Good  Adherence Estimation Source: Claims history  Barriers to Adherence: Patient forgets  Action Taken to Address Barriers to Adherence: provided education  What concerns do you have regarding your medications?: no concerns   The importance of adherence was discussed and patient/caregiver was advised to take the medication as prescribed by their provider. Encouraged patient/caregiver to call physician's office or specialty pharmacy if they have a question regarding a missed dose.    General Assessment  Changes to home medications, OTCs or supplements: No  Current Medications[1]  Reported new allergies: No  Reported new medical conditions: No  Additional monitoring reviewed: N/a  Is laboratory follow up needed? No    Advised to contact the pharmacy if there are any changes to the patient's medication list, including prescriptions, OTC medications, herbal products, or supplements.    Impression/Plan  This patient has not been identified as high risk.   The following action was taken: N/A.    QOL/Patient Satisfaction  Rate your quality of life on scale of 1-10: 10 - Completely satisfied  Rate your satisfaction with  Specialty Pharmacy on scale of 1-10: 10 - Completely satisfied    Provided contact information ([phone]) for [facility] and reviewed dispensing process, refill timeline and patient management follow up. Confirmed understanding of education conducted during assessment. All questions and concerns were addressed and patient/caregiver was encouraged to reach out for additional questions or concerns.    Based on the patient's diagnosis, medication list, progress towards goals,  adherence, tolerance, and medication list, medication remains appropriate: Therapy remains appropriate (I attest)      Lissett Kam (Katie), Cindy  [facility]  Clinical Pharmacy Specialist- Fertility   [facility] [place]  [address] [place] [address]  Email: [email or web address]  Tel: [phone]       Fax: [phone]             [1]   Current Outpatient Medications   Medication Sig Dispense Refill    cholecalciferol (Vitamin D-3) 25 MCG (1000 UT) capsule Take 1 capsule (25 mcg) by mouth once daily.      cyclobenzaprine (Flexeril) 10 mg tablet Take 1 tablet (10 mg) by mouth as needed at bedtime.      elagolix (Orilissa) tablet Take 1 tablet (200 mg) by mouth 2 times a day. 56 tablet 1    estradiol (Vivelle-Dot) 0.1 mg/24 hr patch Place 3 patches over 48 hours on the skin 4 times a week. Apply three (3) new patches to lower stomach once every other day as directed per provider. 48 patch 2    fluticasone (Flonase) 50 mcg/actuation nasal spray Administer 2 sprays into each nostril once daily. Shake gently. Before first use, prime pump. After use, clean tip and replace cap. 16 g 5    hydroxychloroquine (Plaquenil) 200 mg tablet Take 1 tablet (200 mg) by mouth.      letrozole (Femara) 2.5 mg tablet Take 2 tablets (5 mg total) by mouth once daily.  Take with or without food. 60 tablet 2    leuprolide, 1-month, (Lupron Depot) 3.75 mg injection Inject 3.75 mg into the muscle every 30 (thirty) days. 1 kit 2    levothyroxine (Synthroid, Levoxyl) 50 mcg tablet Take 1 tablet (50 mcg) by mouth once daily. Take on an empty stomach at the same time each day, either 30 to 60 minutes prior to breakfast 30 tablet 2    lidocaine-prilocaine (Emla) 2.5-2.5 % cream Apply topically once daily. Apply to treatment area 1 hour prior to injection. 30 g 2    LORazepam (Ativan) 1 mg tablet Take 1 tablet 1 hour prior to flying 6 tablet 0    medroxyPROGESTERone  "(Provera) 10 mg tablet Take 1 tablet (10 mg) by mouth once daily. 10 tablet 0    predniSONE (Deltasone) 10 mg tablet Take 0.5 tablets (5 mg) by mouth once daily. Begin taking the day you start progesterone in oil. 30 tablet 2    prenat.vits,shellie,min-iron-folic tablet Take 1 tablet by mouth once daily.      progesterone (Prometrium) 200 mg capsule Insert 1 capsule (200 mg) into the vagina 2 times a day. Insert 1 capsule (200 mg) into the vagina 2 times a day 60 capsule 2    progesterone 50 mg/mL injection Inject 2 mL (100 mg) into the muscle once daily. Inject into the muscle at the same time each morning as directed per provider. 50 mL 3    sertraline (Zoloft) 50 mg tablet Take 1 tablet (50 mg) by mouth once daily. 90 tablet 3    sodium,potassium,mag sulfates (Suprep) 17.5-3.13-1.6 gram solution Take one bottle twice as directed by the prep instructions 2 each 0    Soolantra 1 % cream 1 Application once daily. APPLY TOPICALLY TO FACE 1 TIME IN THE MORNING      transparent dressings 2 3/8 X 2 3/4 \" bandage Apply 1 each topically once daily. Use as directed to cover lidocaine cream. 30 each 3     No current facility-administered medications for this visit.     "

## 2025-06-06 ENCOUNTER — APPOINTMENT (OUTPATIENT)
Dept: RADIOLOGY | Facility: CLINIC | Age: 45
End: 2025-06-06
Payer: COMMERCIAL

## 2025-06-06 DIAGNOSIS — J06.9 RECURRENT UPPER RESPIRATORY INFECTION (URI): ICD-10-CM

## 2025-06-06 PROCEDURE — 76536 US EXAM OF HEAD AND NECK: CPT | Performed by: RADIOLOGY

## 2025-06-06 PROCEDURE — 76536 US EXAM OF HEAD AND NECK: CPT

## 2025-06-09 NOTE — PROGRESS NOTES
Patient is a 46 yo patient of Dr. Chong who presents for recheck of TSH and updated HgbA1C.  Patient plans to set up for FET when boarding pass items are complete.    4/30: TSH = 0.3. Dr. Chong decreased Synthroid dose from 75mcg to 50mcg.  5/16: TSH = 0.16.  Pt continued Synthroid 50 mcg daily.  6/9: TSH =  HgbA1C=    Will review with provider once results are received.      06/09/25 at 10:40 AM - LYNDA BERNARD RN     No result to review. Message sent to add patient to noon huddle for tomorrow. [employer] message sent to patient to check in as well.   MADDIE GARZA RN 06/09/2025 16:27

## 2025-06-10 NOTE — PROGRESS NOTES
Patient is a 44 yo patient of Dr. Chong who presents for recheck of TSH and updated HgbA1C.  Patient plans to set up for FET when boarding pass items are complete.     4/30: TSH = 0.3. Dr. Chong decreased Synthroid dose from 75mcg to 50mcg.  5/16: TSH = 0.16.  Pt continued Synthroid 50 mcg daily.  6/9: TSH =  HgbA1C=     Will review with provider once results are received.     MADDIE GARZA RN 06/10/2025 10:03    No labs to review at this time. Will send [employer] message to patient to check in and add to noon huddle for review tomorrow.   MADDIE GARZA RN 06/10/2025 15:46

## 2025-06-11 LAB
EST. AVERAGE GLUCOSE BLD GHB EST-MCNC: 114 MG/DL
EST. AVERAGE GLUCOSE BLD GHB EST-SCNC: 6.3 MMOL/L
HBA1C MFR BLD: 5.6 %
TSH SERPL-ACNC: 1.4 MIU/L

## 2025-06-11 NOTE — PROGRESS NOTES
Patient is a 46 yo patient of Dr. Chong who presents for recheck of TSH and updated HgbA1C.  Patient plans to set up for FET when boarding pass items are complete.     4/30: TSH = 0.3. Dr. Chong decreased Synthroid dose from 75mcg to 50mcg.  5/16: TSH = 0.16.  Pt continued Synthroid 50 mcg daily.  6/10: TSH = 1.40  YsyJ4L=5.6%    Will review with provider and contact patient.     Phyllis Arias 06/11/25 8:02 AM     MD note: reviewed TSH level. Can remain on same dose of synthroid. Message to be sent to Dr. Chong if she wants to start patient on metformin due to HgA1c.    Joellen Onofre 06/11/25 11:53 AM      Called patient to review lab work after discussed with provider. Patient TSH level WNL and she will continue current dose of Synthroid. Informed patient that her hemoglobin A1c is on the higher end of normal at 5.6% and that I sent a message to Dr. Chong to see if she recommends patient taking a medication such as Metformin. Let patient know I will get back to her once I hear back from Dr. Chong. Patient educated on lower carb diet in the meantime to help level come down. Patient verbalized understanding. BP updated with recent lab results.  Phyllis Arias 06/11/25 2:33 PM

## 2025-06-12 ENCOUNTER — DOCUMENTATION (OUTPATIENT)
Dept: ENDOCRINOLOGY | Facility: CLINIC | Age: 45
End: 2025-06-12
Payer: COMMERCIAL

## 2025-06-12 NOTE — PROGRESS NOTES
MD Phyllis Swartz RN  That is a borderline result.  She can start it if she wants but not required- would definitely recommend cutting back on sugar in the diet and adding exercise.  If she does want to start would recommend 750 mg XL daily          Previous Messages       ----- Message -----  From: Phyllis Arias RN  Sent: 6/11/2025   2:23 PM EDT  To: Jaymie Chong MD  Subject: A1c                                              Hi Jocelyn Conrad was on the huddle for today for TSH and hemoglobin A1c review for her checklist items. I reviewed with Joellen, she wanted me to send you a message because her A1c came back at 5.6%. looks like she is not on any metformin, would you recommend she start this?

## 2025-06-13 ENCOUNTER — PREP FOR PROCEDURE (OUTPATIENT)
Dept: ENDOCRINOLOGY | Facility: CLINIC | Age: 45
End: 2025-06-13
Payer: COMMERCIAL

## 2025-06-13 ENCOUNTER — HOSPITAL ENCOUNTER (OUTPATIENT)
Dept: ENDOCRINOLOGY | Facility: CLINIC | Age: 45
Discharge: HOME | End: 2025-06-13
Payer: COMMERCIAL

## 2025-06-13 VITALS
OXYGEN SATURATION: 95 % | RESPIRATION RATE: 16 BRPM | WEIGHT: 162.04 LBS | HEIGHT: 64 IN | BODY MASS INDEX: 27.66 KG/M2 | HEART RATE: 79 BPM | TEMPERATURE: 98.8 F | SYSTOLIC BLOOD PRESSURE: 120 MMHG | DIASTOLIC BLOOD PRESSURE: 80 MMHG

## 2025-06-13 DIAGNOSIS — N71.1 CHRONIC ENDOMETRITIS: Primary | ICD-10-CM

## 2025-06-13 DIAGNOSIS — Z31.41 FERTILITY TESTING: ICD-10-CM

## 2025-06-13 LAB — PREGNANCY TEST URINE, POC: NEGATIVE

## 2025-06-13 RX ORDER — KETOROLAC TROMETHAMINE 30 MG/ML
30 INJECTION, SOLUTION INTRAMUSCULAR; INTRAVENOUS ONCE AS NEEDED
Status: DISCONTINUED | OUTPATIENT
Start: 2025-06-13 | End: 2025-06-14 | Stop reason: HOSPADM

## 2025-06-13 RX ORDER — KETOROLAC TROMETHAMINE 30 MG/ML
30 INJECTION, SOLUTION INTRAMUSCULAR; INTRAVENOUS ONCE AS NEEDED
Status: CANCELLED | OUTPATIENT
Start: 2025-06-13 | End: 2025-06-18

## 2025-06-13 RX ORDER — ACETAMINOPHEN 325 MG/1
650 TABLET ORAL ONCE AS NEEDED
Status: CANCELLED | OUTPATIENT
Start: 2025-06-13

## 2025-06-13 RX ORDER — ACETAMINOPHEN 325 MG/1
650 TABLET ORAL ONCE AS NEEDED
Status: DISCONTINUED | OUTPATIENT
Start: 2025-06-13 | End: 2025-06-14 | Stop reason: HOSPADM

## 2025-06-13 RX ORDER — DOXYCYCLINE 100 MG/1
100 CAPSULE ORAL 2 TIMES DAILY
Qty: 28 CAPSULE | Refills: 0 | Status: SHIPPED | OUTPATIENT
Start: 2025-06-13 | End: 2025-06-27

## 2025-06-13 ASSESSMENT — PAIN - FUNCTIONAL ASSESSMENT: PAIN_FUNCTIONAL_ASSESSMENT: 0-10

## 2025-06-13 ASSESSMENT — PAIN SCALES - GENERAL: PAINLEVEL_OUTOF10: 0 - NO PAIN

## 2025-06-13 NOTE — PROGRESS NOTES
Patient ID: Jocelyn Dee is a 45 y.o. female.    Hysteroscopy diagnostic    Date/Time: 6/13/2025 2:18 PM    Performed by: Haylie Saleh MD  Authorized by: Jaymie Chong MD    Consent:     Consent obtained:  Verbal and written    Consent given by:  Patient    Risks, benefits, and alternatives were discussed: yes      Risks discussed:  Bleeding, infection and pain  Universal protocol:     Procedure explained and questions answered to patient or proxy's satisfaction: yes      Relevant documents present and verified: yes      Test results available: yes      Imaging studies available: yes      Required blood products, implants, devices, and special equipment available: yes      Immediately prior to procedure, a time out was called: yes      Patient identity confirmed:  Verbally with patient, arm band and hospital-assigned identification number  Pre-procedure details:     Skin preparation:  Povidone-iodine  Procedure specific details:      Procedure: Diagnostic Hysteroscopy   Preop diagnosis: fertility testing  Post op diagnosis: Same   Assistant: Fellow    Anesthesia: None   IV: None   EBL: 3 cc  Specimens: None   Complications: None   Risks benefits and alternatives of the procedure explained to the patient and informed consent was obtained. Urine pregnancy test was performed and was negative. Time out was performed. The patient was placed in the dorsal lithotomy position and a sterile speculum was placed in the vagina. The cervix was sterilized with Betadine x3. Paracervical block with lidocaine 1% was administered.   Tenaculum: YES  Dilation: NO  The hysteroscope was placed in the cervix and advanced into the uterine cavity. Normal saline was used for distension media. Images were obtained and findings noted as below.   All instruments were then removed. Good hemostasis was noted. Patient tolerated the procedure well returned to the recovery area in stable condition. .   Findings:   Cavity: Smooth cavity  no lesions noted  Ostia: Bilateral tubal ostia visualized  Additional Notes: chronic endometritis vs atrophy from orlissa. Given clinical hx with +CE in the past and recent IPAS will send 14d doxy          Post-procedure details:     Procedure completion:  Tolerated well, no immediate complications    aHylie Saleh MD

## 2025-06-16 ENCOUNTER — APPOINTMENT (OUTPATIENT)
Dept: ENDOCRINOLOGY | Facility: CLINIC | Age: 45
End: 2025-06-16
Payer: COMMERCIAL

## 2025-06-17 ENCOUNTER — APPOINTMENT (OUTPATIENT)
Dept: PRIMARY CARE | Facility: CLINIC | Age: 45
End: 2025-06-17
Payer: COMMERCIAL

## 2025-06-18 ENCOUNTER — ANESTHESIA EVENT (OUTPATIENT)
Dept: GASTROENTEROLOGY | Facility: EXTERNAL LOCATION | Age: 45
End: 2025-06-18

## 2025-06-18 DIAGNOSIS — E03.9 HYPOTHYROIDISM, UNSPECIFIED TYPE: ICD-10-CM

## 2025-06-20 ENCOUNTER — DOCUMENTATION (OUTPATIENT)
Dept: ENDOCRINOLOGY | Facility: CLINIC | Age: 45
End: 2025-06-20
Payer: COMMERCIAL

## 2025-06-20 NOTE — PROGRESS NOTES
This RN had secure chat conversation with Clara Roa regarding meeting with [facility] again for recommendation on echo since patient is 45.       11:52 AM  Clara Leo RN   I messaged Dr. Kumar about this patient today & recommendations for an echo. Per Dr. Kumar: We generally don't get echocardiograms for age 45 (or older) unless they have other risk factors. But if their ([facility]) guidelines recommend it then I suppose they can order it    11:52 AM  Jennifer Christian RN  if he does not think it is recommended, then if we can just document that, that should be sufficent! it was only if it was recommended    12:26 PM  Clara Leo RN  If she doesn't have any other risk factors at this time - it is not recommended. Thanks

## 2025-06-23 ENCOUNTER — CLINICAL SUPPORT (OUTPATIENT)
Dept: PRIMARY CARE | Facility: CLINIC | Age: 45
End: 2025-06-23
Payer: COMMERCIAL

## 2025-06-23 ENCOUNTER — HOSPITAL ENCOUNTER (OUTPATIENT)
Dept: RADIOLOGY | Facility: CLINIC | Age: 45
Discharge: HOME | End: 2025-06-23
Payer: COMMERCIAL

## 2025-06-23 DIAGNOSIS — Z00.00 HEALTHCARE MAINTENANCE: ICD-10-CM

## 2025-06-23 DIAGNOSIS — Z12.31 VISIT FOR SCREENING MAMMOGRAM: ICD-10-CM

## 2025-06-23 PROCEDURE — 77067 SCR MAMMO BI INCL CAD: CPT

## 2025-06-23 PROCEDURE — 77067 SCR MAMMO BI INCL CAD: CPT | Performed by: RADIOLOGY

## 2025-06-23 PROCEDURE — 77063 BREAST TOMOSYNTHESIS BI: CPT | Performed by: RADIOLOGY

## 2025-06-23 PROCEDURE — 93000 ELECTROCARDIOGRAM COMPLETE: CPT | Performed by: INTERNAL MEDICINE

## 2025-06-25 ENCOUNTER — HOSPITAL ENCOUNTER (OUTPATIENT)
Dept: RADIOLOGY | Facility: EXTERNAL LOCATION | Age: 45
Discharge: HOME | End: 2025-06-25

## 2025-06-30 ENCOUNTER — APPOINTMENT (OUTPATIENT)
Facility: CLINIC | Age: 45
End: 2025-06-30
Payer: COMMERCIAL

## 2025-06-30 RX ORDER — ONDANSETRON HYDROCHLORIDE 2 MG/ML
4 INJECTION, SOLUTION INTRAVENOUS ONCE AS NEEDED
Status: CANCELLED | OUTPATIENT
Start: 2025-06-30

## 2025-07-01 ENCOUNTER — APPOINTMENT (OUTPATIENT)
Dept: GASTROENTEROLOGY | Facility: EXTERNAL LOCATION | Age: 45
End: 2025-07-01
Payer: COMMERCIAL

## 2025-07-01 ENCOUNTER — ANESTHESIA (OUTPATIENT)
Dept: GASTROENTEROLOGY | Facility: EXTERNAL LOCATION | Age: 45
End: 2025-07-01

## 2025-07-01 VITALS
DIASTOLIC BLOOD PRESSURE: 81 MMHG | HEIGHT: 64 IN | SYSTOLIC BLOOD PRESSURE: 120 MMHG | BODY MASS INDEX: 27.31 KG/M2 | HEART RATE: 89 BPM | RESPIRATION RATE: 17 BRPM | TEMPERATURE: 97.2 F | WEIGHT: 160 LBS | OXYGEN SATURATION: 99 %

## 2025-07-01 DIAGNOSIS — Z12.11 ENCOUNTER FOR SCREENING COLONOSCOPY: ICD-10-CM

## 2025-07-01 PROCEDURE — 45385 COLONOSCOPY W/LESION REMOVAL: CPT | Performed by: STUDENT IN AN ORGANIZED HEALTH CARE EDUCATION/TRAINING PROGRAM

## 2025-07-01 PROCEDURE — 45380 COLONOSCOPY AND BIOPSY: CPT | Performed by: STUDENT IN AN ORGANIZED HEALTH CARE EDUCATION/TRAINING PROGRAM

## 2025-07-01 RX ORDER — PROPOFOL 10 MG/ML
INJECTION, EMULSION INTRAVENOUS AS NEEDED
Status: DISCONTINUED | OUTPATIENT
Start: 2025-07-01 | End: 2025-07-01

## 2025-07-01 RX ORDER — SODIUM CHLORIDE 9 MG/ML
INJECTION, SOLUTION INTRAVENOUS CONTINUOUS PRN
Status: DISCONTINUED | OUTPATIENT
Start: 2025-07-01 | End: 2025-07-01

## 2025-07-01 RX ORDER — LIDOCAINE HYDROCHLORIDE 20 MG/ML
INJECTION, SOLUTION INFILTRATION; PERINEURAL AS NEEDED
Status: DISCONTINUED | OUTPATIENT
Start: 2025-07-01 | End: 2025-07-01

## 2025-07-01 RX ADMIN — PROPOFOL 100 MG: 10 INJECTION, EMULSION INTRAVENOUS at 08:44

## 2025-07-01 RX ADMIN — PROPOFOL 90 MG: 10 INJECTION, EMULSION INTRAVENOUS at 08:34

## 2025-07-01 RX ADMIN — SODIUM CHLORIDE: 9 INJECTION, SOLUTION INTRAVENOUS at 08:34

## 2025-07-01 RX ADMIN — PROPOFOL 100 MG: 10 INJECTION, EMULSION INTRAVENOUS at 08:38

## 2025-07-01 RX ADMIN — PROPOFOL 110 MG: 10 INJECTION, EMULSION INTRAVENOUS at 08:49

## 2025-07-01 RX ADMIN — LIDOCAINE HYDROCHLORIDE 3 ML: 20 INJECTION, SOLUTION INFILTRATION; PERINEURAL at 08:34

## 2025-07-01 SDOH — HEALTH STABILITY: MENTAL HEALTH: CURRENT SMOKER: 0

## 2025-07-01 ASSESSMENT — PAIN SCALES - GENERAL
PAINLEVEL_OUTOF10: 0 - NO PAIN

## 2025-07-01 ASSESSMENT — PAIN - FUNCTIONAL ASSESSMENT
PAIN_FUNCTIONAL_ASSESSMENT: 0-10

## 2025-07-01 NOTE — DISCHARGE INSTRUCTIONS

## 2025-07-01 NOTE — ANESTHESIA PREPROCEDURE EVALUATION
Patient: Jocelyn Dee    Procedure Information       Date/Time: 07/01/25 0830    Scheduled providers: Geraldine Ansari MD    Procedure: COLONOSCOPY    Location: [facility]            Relevant Problems   Cardiac   (+) Hyperlipidemia      Neuro   (+) Anxiety   (+) Anxiety with flying   (+) Cervical radiculopathy, acute   (+) Lumbosacral radiculitis      GI   (+) Irritable bowel syndrome with both constipation and diarrhea      Endocrine   (+) Hypothyroidism      HEENT   (+) Chronic pansinusitis      ID   (+) Acute URI   (+) Acute respiratory infection   (+) Chronic infection   (+) Human papilloma virus (HPV) infection   (+) Recurrent upper respiratory infection (URI)   (+) Upper respiratory tract infection       Clinical information reviewed:   Tobacco  Allergies  Meds   Med Hx  Surg Hx   Fam Hx  Soc Hx        NPO Detail:  No data recorded     Physical Exam    Airway  Mallampati: II  TM distance: >3 FB  Neck ROM: full  Mouth opening: 3 or more finger widths     Cardiovascular - normal exam   Dental - normal exam     Pulmonary - normal exam   Abdominal - normal exam           Anesthesia Plan    History of general anesthesia?: yes  History of complications of general anesthesia?: no    ASA 2     MAC     The patient is not a current smoker.  Patient was not previously instructed to abstain from smoking on day of procedure.  Patient did not smoke on day of procedure.    intravenous induction   Anesthetic plan and risks discussed with patient.

## 2025-07-01 NOTE — LETTER
July 7, 2025     Jaymie Chong MD  [address] [facility]  [facility] [address]    Patient: Jocelyn Dee   YOB: 1980   Date of Visit: 7/1/2025     Dear Dr. Jaymie Chong MD:    Thank you for referring Jocelyn Dee to me for evaluation.     Her colonoscopy was completed with excellent prep and no difficulty. The polyp removed was small and appearance was benign. Although final pathology is pending, I do not anticipate she will require sooner surveillance than 7 years. She is at acceptable risk to proceed with further fertility treatments from a gastrointestinal standpoint.      Please do not hesitate to contact with me with a any further questions or concerns.     Sincerely,        Geraldine Ansari MD        CC: Jocelyn Dee

## 2025-07-01 NOTE — ANESTHESIA POSTPROCEDURE EVALUATION
Patient: Jocelyn Dee    Procedure Summary       Date: 07/01/25 Room / Location: [facility]    Anesthesia Start: 0831 Anesthesia Stop: 0853    Procedure: COLONOSCOPY Diagnosis: Encounter for screening colonoscopy    Scheduled Providers: Geraldine Ansari MD Responsible Provider: SHANICE Hilario    Anesthesia Type: MAC ASA Status: 2            Anesthesia Type: MAC    Vitals Value Taken Time   BP 97/59 07/01/25 08:53   Temp 36.2 °C (97.2 °F) 07/01/25 08:53   Pulse 80 07/01/25 08:53   Resp 13 07/01/25 08:53   SpO2 97 % 07/01/25 08:53       Anesthesia Post Evaluation    Patient participation: complete - patient participated  Level of consciousness: awake  Pain management: adequate  Airway patency: patent  Cardiovascular status: acceptable  Respiratory status: acceptable  Hydration status: acceptable  Postoperative Nausea and Vomiting: none        No notable events documented.

## 2025-07-02 RX ORDER — PROGESTERONE 50 MG/ML
100 INJECTION, SOLUTION INTRAMUSCULAR DAILY
Qty: 50 ML | Refills: 3 | Status: SHIPPED | OUTPATIENT
Start: 2025-07-02 | End: 2026-07-02

## 2025-07-02 RX ORDER — PROGESTERONE 200 MG/1
200 CAPSULE ORAL 2 TIMES DAILY
Qty: 60 CAPSULE | Refills: 2 | Status: SHIPPED | OUTPATIENT
Start: 2025-07-02 | End: 2026-07-02

## 2025-07-02 RX ORDER — ASPIRIN 81 MG/1
81 TABLET ORAL DAILY
Qty: 30 TABLET | Refills: 2 | Status: SHIPPED | OUTPATIENT
Start: 2025-07-02 | End: 2026-07-02

## 2025-07-02 RX ORDER — LIDOCAINE AND PRILOCAINE 25; 25 MG/G; MG/G
CREAM TOPICAL DAILY
Qty: 30 G | Refills: 2 | Status: SHIPPED | OUTPATIENT
Start: 2025-07-02 | End: 2026-07-02

## 2025-07-02 RX ORDER — ESTRADIOL 0.1 MG/D
3 FILM, EXTENDED RELEASE TRANSDERMAL
Qty: 48 PATCH | Refills: 2 | Status: SHIPPED | OUTPATIENT
Start: 2025-07-03 | End: 2026-07-03

## 2025-07-02 RX ORDER — ENOXAPARIN SODIUM 100 MG/ML
40 INJECTION SUBCUTANEOUS DAILY
Qty: 30 EACH | Refills: 2 | Status: SHIPPED | OUTPATIENT
Start: 2025-07-02

## 2025-07-02 RX ORDER — PROPYLENE GLYCOL/PEG 400 0.3 %-0.4%
1 DROPS OPHTHALMIC (EYE) DAILY
Qty: 30 EACH | Refills: 3 | Status: SHIPPED | OUTPATIENT
Start: 2025-07-02

## 2025-07-02 RX ORDER — PREDNISONE 10 MG/1
10 TABLET ORAL DAILY
Qty: 30 TABLET | Refills: 2 | Status: SHIPPED | OUTPATIENT
Start: 2025-07-02 | End: 2026-07-02

## 2025-07-03 ENCOUNTER — TELEPHONE (OUTPATIENT)
Dept: ENDOCRINOLOGY | Facility: CLINIC | Age: 45
End: 2025-07-03

## 2025-07-03 ENCOUNTER — TELEPHONE (OUTPATIENT)
Dept: GASTROENTEROLOGY | Facility: CLINIC | Age: 45
End: 2025-07-03

## 2025-07-03 ENCOUNTER — APPOINTMENT (OUTPATIENT)
Dept: OTOLARYNGOLOGY | Facility: CLINIC | Age: 45
End: 2025-07-03
Payer: COMMERCIAL

## 2025-07-03 NOTE — TELEPHONE ENCOUNTER
Returned patient's call. Patient received clearnce from PCP to proceed with IVF. Letter is in EPIC. Will add this to boarding pass. Patient stated she reached out to her GI doc regarding colonoscopy and if they can clear her to proceed with IVF prior to biopsy results coming back. Due to holiday weekend, we will touch base on Monday 7/7 to confirm. She has enough letrozole, and Orilissa to make it through 7/8 in case she needs to extend while we await response/results.   Patient aware for her and partner to sign consent forms this weekend.     Plan was for tentative lining check on 7/22, ROBB start on 7/27 for FET on 8/1 but of course this can change if she needs to extend suppression.     Jennifer Christian 07/03/25 3:44 PM

## 2025-07-03 NOTE — TELEPHONE ENCOUNTER
Patient called regarding her colonoscopy/biopsy results from her procedure on July 1, 2025 with Dr. Ansari.  Please call her.  Thank you.

## 2025-07-03 NOTE — TELEPHONE ENCOUNTER
Spoke to patient and let her know the biopsy results usually take about 2 weeks before they come back also I told I forward the message to Dr. Oshea about getting a letter sent to her OBGYN to see if it's ok to do another round of IVF.

## 2025-07-07 ENCOUNTER — TELEPHONE (OUTPATIENT)
Dept: ENDOCRINOLOGY | Facility: CLINIC | Age: 45
End: 2025-07-07
Payer: COMMERCIAL

## 2025-07-07 ENCOUNTER — APPOINTMENT (OUTPATIENT)
Dept: ENDOCRINOLOGY | Facility: CLINIC | Age: 45
End: 2025-07-07
Payer: COMMERCIAL

## 2025-07-07 ENCOUNTER — DOCUMENTATION (OUTPATIENT)
Dept: ENDOCRINOLOGY | Facility: CLINIC | Age: 45
End: 2025-07-07
Payer: COMMERCIAL

## 2025-07-07 DIAGNOSIS — N97.9 FEMALE INFERTILITY: ICD-10-CM

## 2025-07-07 RX ORDER — PROGESTERONE 50 MG/ML
75 INJECTION, SOLUTION INTRAMUSCULAR DAILY
Qty: 50 ML | Refills: 3 | Status: CANCELLED | OUTPATIENT
Start: 2025-07-07 | End: 2026-07-07

## 2025-07-07 RX ORDER — PROGESTERONE 50 MG/ML
100 INJECTION, SOLUTION INTRAMUSCULAR DAILY
Qty: 50 ML | Refills: 3 | Status: SHIPPED | OUTPATIENT
Start: 2025-07-07 | End: 2026-07-07

## 2025-07-07 NOTE — TELEPHONE ENCOUNTER
LVM with patient. We received letter from GI doctor that she is okay to proceed from their standpoint with fertility treatments after colonoscopy.   Patient advised to complete her consent forms today and take last Orilissa and letrozole tomorrow 7/8 and start transfer medications 7/9.   Patient is being brought to [facility] for FET set up on 7/9.     Jennifer Christian 07/07/25 2:54 PM

## 2025-07-07 NOTE — PROGRESS NOTES
MD Jennifer Swartz, NEGAR; P Mac [id number] [facility] Clinical Support Staff  Yes if she tolerated ethyl oleate better would do that          Previous Messages       ----- Message -----  From: Jennifer Christian RN  Sent: 7/2/2025   4:06 PM EDT  To: Jaymie Chong MD  Subject: ROBB                                              Hi Dr. Chong,    This patient has some questions regarding  her ROBB for upcoming FET.  She previously did two FET cycles that were unsuccessful using our standard ROBB    We then switched to Prog in Ethyl Oleate because she said her nerves were very sensitive to ROBB. No rashes/itching, but did have better response. She said this was the only cycle that ended up in positive bhcg. But we are also adding in more adjuncts as well.  She said it is more expensive so would do what you recommend

## 2025-07-08 ENCOUNTER — TELEPHONE (OUTPATIENT)
Dept: ENDOCRINOLOGY | Facility: CLINIC | Age: 45
End: 2025-07-08
Payer: COMMERCIAL

## 2025-07-08 ENCOUNTER — SPECIALTY PHARMACY (OUTPATIENT)
Dept: PHARMACY | Facility: CLINIC | Age: 45
End: 2025-07-08

## 2025-07-08 NOTE — TELEPHONE ENCOUNTER
Reason for call: Call Back  Notes: Pt would like to discuss next steps. She stated she was expecting a call yesterday to touch base but has not yet heard back. Today is her last day of meds.

## 2025-07-08 NOTE — TELEPHONE ENCOUNTER
Returned patient call regarding suppression/FET set up. Patient reminded to complete Engaged MD forms ASAP. Patient setting up for following transfer protocol:     Fertility Plan Update:   2 months of endo suppression with Orilissa and Letrozole      Programmed FET Estrace patches, IM P4 100 mg+ vaginal prometrium 200 mg BID (starting 1 day after IM P4)  Prednisone  10 mg to start with STIM START  Stay on Plaquenil per rheumatology recommendations  Baby ASA and Lovenox 40 mg subcutaneous daily to start with transfer    Patient instructed we received letter from GI doctor that she is okay to proceed from their standpoint with fertility treatments after colonoscopy. Patient instructed to take last Orilissa and letrozole tomorrow 7/8 and start transfer medications tomorrow. Patient is being brought to noon huddle for FET set up on tomorrow. Patient instructed the plan is for tentative lining check on 7/22, ROBB start on 7/27 for FET on 8/1. Patient agreeable and denies further questions/concerns. Follow up my chart message sent to patient.   ARTHUR AMOS on 7/8/25 at 4:48 PM.

## 2025-07-09 NOTE — PROGRESS NOTES
Patient called with menses for FET setup.   LMP: n/a - patient has been on 2 months of Suppression with Orilissa and Letrozole   Protocol:   Programmed FET Estrace patches, IM P4 (ethyl oleate) 100 mg+ vaginal prometrium 200 mg BID (starting 1 day after IM P4)  Prednisone 10 mg to start with STIM START  Stay on Plaquenil per rheumatology recommendations  Baby ASA and Lovenox 40 mg subcutaneous daily to start with transfer  Tentative lining check: 7/22  Tentative progesterone start: 7/27  Tentative transfer date: 8/1  Number of days of progesterone for transfer: 6  Treatment plan confirmed: *Needs to sign, reminded today*  In waiver confirmed: *Needs to sign, reminded today*    Embryo # confirmed: 6 untested  Location of Embryo: [facility]  Embryo # to transfer: 1    Boarding pass signed off:  No will need to be signed off once FET treatment plan signed    Jennifer Christian  07/09/2025  8:11 AM    Haylie Saleh 07/09/25 12:11 PM

## 2025-07-10 ENCOUNTER — DOCUMENTATION (OUTPATIENT)
Dept: ENDOCRINOLOGY | Facility: CLINIC | Age: 45
End: 2025-07-10
Payer: COMMERCIAL

## 2025-07-10 LAB
LABORATORY COMMENT REPORT: NORMAL
PATH REPORT.FINAL DX SPEC: NORMAL
PATH REPORT.GROSS SPEC: NORMAL
PATH REPORT.MICROSCOPIC SPEC OTHER STN: NORMAL
PATH REPORT.RELEVANT HX SPEC: NORMAL
PATH REPORT.TOTAL CANCER: NORMAL

## 2025-07-10 NOTE — PROGRESS NOTES
MD Jennifer Swartz RN  Okay that should be sufficient          Previous Messages       ----- Message -----  From: Jennifer Christian RN  Sent: 7/10/2025   3:30 PM EDT  To: Jaymie Chong MD  Subject: GI letter today                                  HI Dr. Chong,    The GI doc did send this letter today:      Dear Ms. Dee:     The final pathology report on the polyps removed during your recent colonoscopy did not show any cancerous growth. This type of polyp, if not removed, can potentially grow and become malignant. Fortunately, we identified and removed the polyp at a very early stage. I would recommend that you undergo a repeat colonoscopy in approximately 7 years.     In addition to the repeat tests indicated above, I suggest you adopt the following healthy habits to lower your risk of future polyps and colon cancer: exercise regularly, don't smoke, limit red meat in your diet, and include ample fruits and vegetables in your diet.     If you have any questions or concerns, please do not hesitate to call.        Sincerely,     Geraldine Ansari MD

## 2025-07-15 ENCOUNTER — DOCUMENTATION (OUTPATIENT)
Dept: ENDOCRINOLOGY | Facility: CLINIC | Age: 45
End: 2025-07-15
Payer: COMMERCIAL

## 2025-07-15 NOTE — PROGRESS NOTES
MD Geraldine Swartz MD  Cc: LUBA Grier [id number] Clinical Support Staff  Thank you!  Jaymie Chong 07/15/25 7:35 AM     ----- Message -----  From: Geraldine Ansari MD  Sent: 7/14/2025   8:42 PM EDT  To: Jaymie Chong MD  Subject: RE: Path result                                  Yes, she Is cleared from GI standpoint to proceed with pregnancy  ----- Message -----  From: Jaymie Chong MD  Sent: 7/10/2025   3:13 PM EDT  To: Geraldine Ansari MD  Subject: Path result                                      Hi-  Just wanted to confirm this patient is cleared to proceed with pregnancy. It looks like her path resulted and was consistent with polyp but wanted to make sure there was nothing that needs follow up on your end.    Thank you,    Jaymie Chong 07/10/25 3:13 PM

## 2025-07-16 ENCOUNTER — APPOINTMENT (OUTPATIENT)
Facility: CLINIC | Age: 45
End: 2025-07-16
Payer: COMMERCIAL

## 2025-07-16 VITALS
WEIGHT: 160 LBS | TEMPERATURE: 97.5 F | HEIGHT: 64 IN | SYSTOLIC BLOOD PRESSURE: 121 MMHG | BODY MASS INDEX: 27.31 KG/M2 | DIASTOLIC BLOOD PRESSURE: 81 MMHG

## 2025-07-16 DIAGNOSIS — M26.609 TEMPOROMANDIBULAR JOINT DISORDER: ICD-10-CM

## 2025-07-16 DIAGNOSIS — H93.13 TINNITUS OF BOTH EARS: ICD-10-CM

## 2025-07-16 DIAGNOSIS — G50.1 ATYPICAL FACE PAIN: ICD-10-CM

## 2025-07-16 DIAGNOSIS — H90.3 BILATERAL SENSORINEURAL HEARING LOSS: Primary | ICD-10-CM

## 2025-07-16 DIAGNOSIS — J30.9 CHRONIC ALLERGIC RHINITIS: ICD-10-CM

## 2025-07-16 PROCEDURE — 99204 OFFICE O/P NEW MOD 45 MIN: CPT | Performed by: OTOLARYNGOLOGY

## 2025-07-16 PROCEDURE — RXMED WILLOW AMBULATORY MEDICATION CHARGE

## 2025-07-16 PROCEDURE — 1036F TOBACCO NON-USER: CPT | Performed by: OTOLARYNGOLOGY

## 2025-07-16 PROCEDURE — 3008F BODY MASS INDEX DOCD: CPT | Performed by: OTOLARYNGOLOGY

## 2025-07-16 NOTE — PROGRESS NOTES
Impression:  1. Bilateral sensorineural hearing loss        2. Tinnitus of both ears        3. Temporomandibular joint disorder        4. Atypical face pain        5. Chronic allergic rhinitis             RECOMMENDATIONS/PLAN :  I reassured the patient that there is no evidence of any infection from her sinuses and more than likely she is dealing with intermittent allergies.  I want her to use Flonase nasal spray-2 puffs each nostril daily and I also want her to rinse her nose using saline rinses on a daily basis.  I gave her the name of a dentist who specializes in TMJ disorder as well.  We will set her up with a formal audiogram in the near future and I will be happy to go over that with her.  We discussed various masking techniques to help cover up the ringing in her ears.  She will touch base with her neurologist regarding the atypical facial pain and numbness and tingling of her neck.      **This electronic medical record note was created with the use of voice recognition software.  Despite proofreading, typographical or grammatical errors may be present that could affect meaning of content **    Subjective   Patient ID:     Jocelyn Dee is a 45 y.o. female who presents to the office today with multiple complaints.  She has a longstanding history of hearing loss and has not had a recent audiogram.  She denies any vertigo or neurologic complaints.  She does have ringing in the ears.  She also complains of TMJ clicking and popping with intermittent pain.  She does clench her teeth at night however she does wear a nightguard on occasion.  She has had some allergy congestion with postnasal drip but no pus draining from the sinuses fever or chills.  Occasionally she has numbness and tingling over her left maxillary sinus.    ROS:  A detailed 12 system review of systems is noted on the intake form has been reviewed with the patient with details noted in the HPI and scanned into the patient's medical  "record.    Objective     Medical History[1]     Surgical History[2]     RX Allergies[3]     Current Medications[4]     Tobacco Use: Low Risk  (7/16/2025)    Patient History     Smoking Tobacco Use: Never     Smokeless Tobacco Use: Never     Passive Exposure: Not on file        Alcohol Use: Not on file        Social History     Substance and Sexual Activity   Drug Use Never        Physical Exam:  Visit Vitals  /81   Temp 36.4 °C (97.5 °F) (Temporal)   Ht 1.626 m (5' 4\")   Wt 72.6 kg (160 lb)   BMI 27.46 kg/m²   OB Status Menopausal   Smoking Status Never   BSA 1.81 m²      General: Patient is alert, oriented, cooperative in no apparent distress.  Head: Normocephalic, atraumatic.  Eyes: PERRL, EOMI, Conjunctiva is clear. No nystagmus.  Ears: Right Ear-- Pinna is normal.  External auditory canal is patent. Tympanic membrane is [intact, translucent and has good mobility with my pneumatic otoscope. No effusion].  Mastoid is nontender.  Left ear-- Pinna is normal.  External auditory canal is patent. Tympanic membrane is [intact, translucent and has good mobility with my pneumatic otoscope.  No effusion].  Mastoid is nontender.  Nose: Septum is relatively straight.  No septal perforation or lesions. No septal hematoma/ seroma.  No signs of bleeding.  Inferior turbinates are mildly swollen.   No evidence of intranasal polyps.  No infectious drainage.  Throat:  Floor of mouth is clear, no masses.  Tongue appears normal, no lesions or masses. Gums, gingiva, buccal mucosa appear pink and moist, no lesions. Teeth are in good repair.  No obvious dental infections.  Peritonsillar regions appear symmetric without swelling.  Hard and soft palate appear normal, no obvious cleft. Uvula is midline.  Oropharynx: No lesions. Retropharyngeal wall is flat.  No active postnasal drip.  Neck: Supple,  no lymphadenopathy.  No masses.  Salivary Glands: Symmetric bilaterally.  No palpable masses.  No evidence of acute infection or " salivary stones  Neurologic: Cranial Nerves 2-12 are grossly intact without focal deficits. Cerebellar function testing is normal.     Results:   []    Procedure:   []    Librado R DO Salud        [1]   Past Medical History:  Diagnosis Date    Dental disease     Disease of thyroid gland     Fatigue     HL (hearing loss)     Nosebleed     Papillomavirus as the cause of diseases classified elsewhere     HPV in female    Personal history of other complications of pregnancy, childbirth and the puerperium     History of spontaneous     Personal history of other diseases of the female genital tract     History of infertility    Personal history of other infectious and parasitic diseases     History of infection due to human papilloma virus (HPV)    Sleep difficulties     Tinnitus     TMJ dysfunction    [2]   Past Surgical History:  Procedure Laterality Date    DILATION AND CURETTAGE OF UTERUS N/A     IPAS    OTHER SURGICAL HISTORY  2022    Tonsillectomy    OTHER SURGICAL HISTORY  2022    Cervical surgery    TONSILLECTOMY     [3] No Known Allergies  [4]   Current Outpatient Medications:     cholecalciferol (Vitamin D-3) 25 MCG (1000 UT) capsule, Take 1 capsule (25 mcg) by mouth once daily., Disp: , Rfl:     estradiol (Vivelle-Dot) 0.1 mg/24 hr patch, Place 3 patches over 48 hours on the skin 4 times a week. Apply three (3) new patches to lower stomach once every other day as directed per provider., Disp: 48 patch, Rfl: 2    estradiol (Vivelle-Dot) 0.1 mg/24 hr patch, Apply three (3) new patches to lower stomach once every other day as directed per provider., Disp: 48 patch, Rfl: 2    hydroxychloroquine (Plaquenil) 200 mg tablet, Take 1 tablet (200 mg) by mouth., Disp: , Rfl:     levothyroxine (Synthroid, Levoxyl) 50 mcg tablet, Take 1 tablet (50 mcg) by mouth once daily. Take on an empty stomach at the same time each day, either 30 to 60 minutes prior to breakfast, Disp: 30 tablet, Rfl: 2    LORazepam  (Ativan) 1 mg tablet, Take 1 tablet 1 hour prior to flying, Disp: 6 tablet, Rfl: 0    medroxyPROGESTERone (Provera) 10 mg tablet, Take 1 tablet (10 mg) by mouth once daily., Disp: 10 tablet, Rfl: 0    predniSONE (Deltasone) 10 mg tablet, Take 0.5 tablets (5 mg) by mouth once daily. Begin taking the day you start progesterone in oil., Disp: 30 tablet, Rfl: 2    predniSONE (Deltasone) 10 mg tablet, Take 1 tablet (10 mg) by mouth once daily. Begin taking the day you start estrace patches, Disp: 30 tablet, Rfl: 2    prenat.vits,shellie,min-iron-folic tablet, Take 1 tablet by mouth once daily., Disp: , Rfl:     progesterone (Prometrium) 200 mg capsule, Insert 1 capsule (200 mg) into the vagina 2 times a day. Insert 1 capsule (200 mg) into the vagina 2 times a day, Disp: 60 capsule, Rfl: 2    progesterone (Prometrium) 200 mg capsule, Insert 1 capsule (200 mg) into the vagina 2 times a day. Insert 1 capsule (200 mg) into the vagina 2 times a day, Disp: 60 capsule, Rfl: 2    progesterone 50 mg/mL injection, Draw up and inject 2 mL (100 mg) into the muscle once daily. Inject into the muscle at the same time each morning as directed per provider., Disp: 50 mL, Rfl: 3    progesterone 50 mg/mL injection, Inject 2 mL (100 mg) into the muscle once daily. Inject into the muscle at the same time each morning as directed per provider., Disp: 50 mL, Rfl: 3    sertraline (Zoloft) 50 mg tablet, Take 1 tablet (50 mg) by mouth once daily., Disp: 90 tablet, Rfl: 3    Soolantra 1 % cream, 1 Application once daily. APPLY TOPICALLY TO FACE 1 TIME IN THE MORNING, Disp: , Rfl:     aspirin 81 mg EC tablet, Take 1 tablet (81 mg) by mouth once daily. (Patient not taking: Reported on 7/16/2025), Disp: 30 tablet, Rfl: 2    cyclobenzaprine (Flexeril) 10 mg tablet, Take 1 tablet (10 mg) by mouth as needed at bedtime. (Patient not taking: Reported on 7/16/2025), Disp: , Rfl:     elagolix (Orilissa) tablet, Take 1 tablet (200 mg) by mouth 2 times a day.  "(Patient not taking: Reported on 7/16/2025), Disp: 56 tablet, Rfl: 1    enoxaparin (Lovenox) 40 mg/0.4 mL syringe, Inject 0.4 mL (40 mg) under the skin once daily. Inject under the skin as directed by provider (Patient not taking: Reported on 7/16/2025), Disp: 30 each, Rfl: 2    fluticasone (Flonase) 50 mcg/actuation nasal spray, Administer 2 sprays into each nostril once daily. Shake gently. Before first use, prime pump. After use, clean tip and replace cap. (Patient not taking: Reported on 7/16/2025), Disp: 16 g, Rfl: 5    letrozole (Femara) 2.5 mg tablet, Take 2 tablets (5 mg total) by mouth once daily.  Take with or without food. (Patient not taking: Reported on 7/16/2025), Disp: 60 tablet, Rfl: 2    leuprolide, 1-month, (Lupron Depot) 3.75 mg injection, Inject 3.75 mg into the muscle every 30 (thirty) days. (Patient not taking: Reported on 7/16/2025), Disp: 1 kit, Rfl: 2    lidocaine-prilocaine (Emla) 2.5-2.5 % cream, Apply topically once daily. Apply to treatment area 1 hour prior to injection. (Patient not taking: Reported on 7/16/2025), Disp: 30 g, Rfl: 2    lidocaine-prilocaine (Emla) 2.5-2.5 % cream, Apply to treatment area 1 hour prior to injection. (Patient not taking: Reported on 7/16/2025), Disp: 30 g, Rfl: 2    transparent dressings 2 3/8 X 2 3/4 \" bandage, Apply 1 each topically once daily. Use as directed to cover lidocaine cream., Disp: 30 each, Rfl: 3    transparent dressings 2 3/8 X 2 3/4 \" bandage, Use as directed to cover lidocaine cream., Disp: 30 each, Rfl: 3    "

## 2025-07-21 ENCOUNTER — APPOINTMENT (OUTPATIENT)
Dept: PRIMARY CARE | Facility: CLINIC | Age: 45
End: 2025-07-21
Payer: COMMERCIAL

## 2025-07-22 ENCOUNTER — ANCILLARY PROCEDURE (OUTPATIENT)
Dept: ENDOCRINOLOGY | Facility: CLINIC | Age: 45
End: 2025-07-22
Payer: COMMERCIAL

## 2025-07-22 ENCOUNTER — LAB REQUISITION (OUTPATIENT)
Dept: LAB | Facility: HOSPITAL | Age: 45
End: 2025-07-22
Payer: COMMERCIAL

## 2025-07-22 DIAGNOSIS — N97.9 FEMALE INFERTILITY: ICD-10-CM

## 2025-07-22 LAB
ESTRADIOL SERPL-MCNC: 159 PG/ML
PROGEST SERPL-MCNC: 0.4 NG/ML

## 2025-07-22 PROCEDURE — 84144 ASSAY OF PROGESTERONE: CPT

## 2025-07-22 PROCEDURE — 76857 US EXAM PELVIC LIMITED: CPT

## 2025-07-22 PROCEDURE — 82670 ASSAY OF TOTAL ESTRADIOL: CPT

## 2025-07-22 NOTE — PROGRESS NOTES
CYCLING NOTE - LINING CHECK  Cycle #: 4  Reason For Treatment: Primary Ovarian Insufficiency, RPL   Protocol: 2 months of suppression with Orilissa and Letrozole followed by Programmed FET with estrace patches, IM P4 100mg + vaginal Prometrium 200mg BID (starting 1 day after ROBB)  Prednisone to start with estrace patches  Stay on Plaquenil per rheumatology recommendations   Baby ASA and Lovenox 40mg subcutaneous to start with FET   Day of stim: 14  Patient Hx: 45 year old, patient of Dr. Chong.   Notes: Plan to start ROBB 7/27 for FET on 8/1    Here for US and/or lab monitoring; relevant findings reviewed.    Patient stayed for nurse visit. Pain is 0/10 and Progesterone teaching completed with patient , injection sites marked. Patient is deciding between ethyl oleate or ROBB. She did have success with ethyl oleate, but is concerned with costs and is using it more so for nerve pain. Dr. Chong recommends ethyl oleate.     Team will contact patient later today with results and plan.    Jennifer Christian  07/22/2025  6:46 AM      LVM with patient with plan. Patient told to continue estrace patches and prednisone, and get p4 level drawn on 7/25 before starting ROBB. She is to call back to confirm if she decided on ethyl oleate or ROBB, and if she wants to come to clinic to get labs drawn.   Jennifer Christian 07/22/25 12:51 PM

## 2025-07-23 ENCOUNTER — PHARMACY VISIT (OUTPATIENT)
Dept: PHARMACY | Facility: CLINIC | Age: 45
End: 2025-07-23
Payer: MEDICARE

## 2025-07-24 ENCOUNTER — TELEPHONE (OUTPATIENT)
Dept: ENDOCRINOLOGY | Facility: CLINIC | Age: 45
End: 2025-07-24
Payer: COMMERCIAL

## 2025-07-25 ENCOUNTER — LAB REQUISITION (OUTPATIENT)
Dept: LAB | Facility: HOSPITAL | Age: 45
End: 2025-07-25
Payer: COMMERCIAL

## 2025-07-25 ENCOUNTER — CLINICAL SUPPORT (OUTPATIENT)
Dept: ENDOCRINOLOGY | Facility: CLINIC | Age: 45
End: 2025-07-25
Payer: COMMERCIAL

## 2025-07-25 ENCOUNTER — APPOINTMENT (OUTPATIENT)
Dept: ENDOCRINOLOGY | Facility: CLINIC | Age: 45
End: 2025-07-25
Payer: COMMERCIAL

## 2025-07-25 DIAGNOSIS — N97.9 FEMALE INFERTILITY, UNSPECIFIED: ICD-10-CM

## 2025-07-25 DIAGNOSIS — N97.9 FEMALE INFERTILITY: ICD-10-CM

## 2025-07-25 LAB — PROGEST SERPL-MCNC: 0.2 NG/ML

## 2025-07-25 PROCEDURE — 84144 ASSAY OF PROGESTERONE: CPT

## 2025-07-25 NOTE — PROGRESS NOTES
Patient here for repeat progesterone level prior to starting ROBB on 7/27. Patient was here for lining check on 7/22 and lining was approved to proceed, but did not start ROBB for another 5 days, so just making sure it is still low in order to proceed.   ROBB 100mg to start 7/27  Prometrium 200mg BID to start 7/28  FET plan for 8/1   Lovenox and baby ASA to start with FET.     Jennifer Christian 07/25/25 7:42 AM      Component      Latest Ref Rng 7/22/2025 7/25/2025   Progesterone      ng/mL 0.4  0.2        Called patient and sent mychart with plan.   Jennifer Christian 07/25/25 2:05 PM

## 2025-08-01 ENCOUNTER — HOSPITAL ENCOUNTER (OUTPATIENT)
Dept: ENDOCRINOLOGY | Facility: CLINIC | Age: 45
Discharge: HOME | End: 2025-08-01

## 2025-08-01 ENCOUNTER — APPOINTMENT (OUTPATIENT)
Dept: ENDOCRINOLOGY | Facility: CLINIC | Age: 45
End: 2025-08-01

## 2025-08-01 ENCOUNTER — PREP FOR PROCEDURE (OUTPATIENT)
Dept: ENDOCRINOLOGY | Facility: CLINIC | Age: 45
End: 2025-08-01
Payer: COMMERCIAL

## 2025-08-01 ENCOUNTER — LAB REQUISITION (OUTPATIENT)
Dept: LAB | Facility: HOSPITAL | Age: 45
End: 2025-08-01
Payer: COMMERCIAL

## 2025-08-01 DIAGNOSIS — Z31.83 ENCOUNTER FOR ASSISTED REPRODUCTIVE FERTILITY CYCLE: ICD-10-CM

## 2025-08-01 DIAGNOSIS — N97.9 FEMALE INFERTILITY, UNSPECIFIED: ICD-10-CM

## 2025-08-01 DIAGNOSIS — N97.9 FEMALE INFERTILITY: ICD-10-CM

## 2025-08-01 DIAGNOSIS — Z32.00 ENCOUNTER FOR PREGNANCY TEST, RESULT UNKNOWN: Primary | ICD-10-CM

## 2025-08-01 LAB — PROGEST SERPL-MCNC: 64.8 NG/ML

## 2025-08-01 PROCEDURE — 89255 PREPARE EMBRYO FOR TRANSFER: CPT | Performed by: OBSTETRICS & GYNECOLOGY

## 2025-08-01 PROCEDURE — 58974 EMBRYO TRANSFER INTRAUTERINE: CPT | Performed by: OBSTETRICS & GYNECOLOGY

## 2025-08-01 PROCEDURE — 89253 EMBRYO HATCHING: CPT | Performed by: OBSTETRICS & GYNECOLOGY

## 2025-08-01 PROCEDURE — 76998 US GUIDE INTRAOP: CPT | Performed by: OBSTETRICS & GYNECOLOGY

## 2025-08-01 PROCEDURE — 84144 ASSAY OF PROGESTERONE: CPT

## 2025-08-01 PROCEDURE — 89352 THAWING CRYOPRESRVED EMBRYO: CPT | Performed by: OBSTETRICS & GYNECOLOGY

## 2025-08-01 RX ORDER — LIDOCAINE HYDROCHLORIDE 10 MG/ML
0.1 INJECTION, SOLUTION INFILTRATION; PERINEURAL ONCE
Status: DISCONTINUED | OUTPATIENT
Start: 2025-08-01 | End: 2025-08-02 | Stop reason: HOSPADM

## 2025-08-01 RX ORDER — ACETAMINOPHEN 325 MG/1
650 TABLET ORAL ONCE AS NEEDED
Status: CANCELLED | OUTPATIENT
Start: 2025-08-01

## 2025-08-01 RX ORDER — ACETAMINOPHEN 325 MG/1
650 TABLET ORAL EVERY 4 HOURS PRN
Status: DISCONTINUED | OUTPATIENT
Start: 2025-08-01 | End: 2025-08-02 | Stop reason: HOSPADM

## 2025-08-01 RX ORDER — LABETALOL HYDROCHLORIDE 5 MG/ML
5 INJECTION, SOLUTION INTRAVENOUS ONCE AS NEEDED
Status: DISCONTINUED | OUTPATIENT
Start: 2025-08-01 | End: 2025-08-02 | Stop reason: HOSPADM

## 2025-08-01 RX ORDER — PROGESTERONE 200 MG/1
200 CAPSULE ORAL 2 TIMES DAILY
Qty: 90 CAPSULE | Refills: 1 | Status: CANCELLED | OUTPATIENT
Start: 2025-08-01

## 2025-08-01 RX ORDER — ACETAMINOPHEN 325 MG/1
650 TABLET ORAL ONCE AS NEEDED
Status: DISCONTINUED | OUTPATIENT
Start: 2025-08-01 | End: 2025-08-02 | Stop reason: HOSPADM

## 2025-08-01 RX ORDER — PROGESTERONE 200 MG/1
200 CAPSULE ORAL 2 TIMES DAILY
Qty: 60 CAPSULE | Refills: 2 | Status: SHIPPED | OUTPATIENT
Start: 2025-08-01 | End: 2026-08-01

## 2025-08-01 RX ORDER — ONDANSETRON HYDROCHLORIDE 2 MG/ML
4 INJECTION, SOLUTION INTRAVENOUS ONCE AS NEEDED
Status: DISCONTINUED | OUTPATIENT
Start: 2025-08-01 | End: 2025-08-02 | Stop reason: HOSPADM

## 2025-08-01 NOTE — DISCHARGE INSTRUCTIONS
[facility]  [address]. [place]  [address]  Tel: [phone]   Fax: [phone]    Home Going Instructions after Embryo Transfer:     After completing your embryo transfer please treat your body as though you are pregnant.  No smoking, alcohol, or recreational drugs.  Make sure you are taking a prenatal vitamin daily.   Continue all medications currently prescribed for embryo transfer until otherwise instructed by your physician, even if you experience spotting or bleeding and even if you have a negative home pregnancy test.   Medications to avoid in pregnancy: Advil, Motrin, Ibuprofen, Aleve, Excedrin, [place], Sudafed, and Pepto-Bismol. Avoid aspirin unless you are taking this daily at the direction of your physician.   Medications that are generally safe in pregnancy: Tylenol, Benadryl, Plain Robitussin, Zyrtec, Claritin, Pepcid, Tums, Rolaids, Mylanta, Nasonex.   Foods to avoid:   Seafood that is high in mercury; you can have canned tuna twice a week.   Deli meats, deli salads, hot dogs, and unpasteurized cheeses due to the risk of Listeria.  Activities to avoid:   No hot tubs, whirlpools, or saunas.  Avoid starting new exercise routines that you have not done previously.  Avoid lifting > 30 lbs.  No cleaning litter boxes.  No intercourse until you test for pregnancy.   You do not need to be on bed rest following the transfer. It is healthy, normal, and recommended to go about routine physical activity.   We advise against taking a home pregnancy test due to the possibility of false negative and false positive results.   You will test for pregnancy in approximately 10 days, at which point you will be about 4 weeks pregnant.   If blood work was drawn on the day of your transfer, you can expect a phone that day with the results of your bloodwork. We expect a progesterone level greater than or equal to 16. If you level is less than 16  we will adjust your progesterone dose and repeat your level in 2 days.     Analy Benitez    10:56 AM

## 2025-08-01 NOTE — PROGRESS NOTES
Patient ID: Jocelyn Dee is a 45 y.o. female.    Embryo Transfer    Date/Time: 8/1/2025 11:41 AM    Performed by: Jaymie Chong MD  Authorized by: Jaymie Chong MD    Consent:     Consent obtained:  Written    Consent given by:  Patient    Procedure risks and benefits discussed: yes      Patient questions answered: yes      Patient agrees, verbalizes understanding, and wants to proceed: yes      Educational handouts given: yes      Instructions and paperwork completed: yes    Procedure:     Pelvic exam performed: No      Cervix cleaned and prepped: Yes      Speculum placed in vagina: Yes      Ultrasound guidance: Yes      Catheter type:  Sure View Balderrama    Difficulty: easy      Estimated blood loss:  None    Embryos transferred:  1    Catheter navigation notes:  Easy afterload, RV curve  Post-procedure:     Patient tolerated procedure well: yes    Comments:      Preop diagnosis: Infertility  Post op diagnosis: Same  Assistant: none  Depth: - cm  Curve: 30 RV  Distance from fundus: 11 mm  Embryo stage at day of transfer: day 5  Embryo notes: 4AA   PGT: Not performed  Anesthesia: None    IV Fluids: None  UOP: Not recorded  Specimens: None  Complications: None    I performed the procedure without a resident or fellow.    Jaymie Chong 08/01/25 11:43 AM

## 2025-08-04 ENCOUNTER — APPOINTMENT (OUTPATIENT)
Facility: CLINIC | Age: 45
End: 2025-08-04
Payer: COMMERCIAL

## 2025-08-04 ENCOUNTER — APPOINTMENT (OUTPATIENT)
Dept: AUDIOLOGY | Facility: CLINIC | Age: 45
End: 2025-08-04
Payer: COMMERCIAL

## 2025-08-11 ENCOUNTER — TELEPHONE (OUTPATIENT)
Dept: ENDOCRINOLOGY | Facility: CLINIC | Age: 45
End: 2025-08-11
Payer: COMMERCIAL

## 2025-08-11 ENCOUNTER — LAB REQUISITION (OUTPATIENT)
Dept: LAB | Facility: HOSPITAL | Age: 45
End: 2025-08-11
Payer: COMMERCIAL

## 2025-08-11 DIAGNOSIS — Z32.00 ENCOUNTER FOR PREGNANCY TEST, RESULT UNKNOWN: ICD-10-CM

## 2025-08-11 DIAGNOSIS — Z01.812 ENCOUNTER FOR PREPROCEDURAL LABORATORY EXAMINATION: ICD-10-CM

## 2025-08-11 LAB — B-HCG SERPL-ACNC: 137 MIU/ML

## 2025-08-11 PROCEDURE — 84702 CHORIONIC GONADOTROPIN TEST: CPT

## 2025-08-14 ENCOUNTER — TELEPHONE (OUTPATIENT)
Dept: ENDOCRINOLOGY | Facility: CLINIC | Age: 45
End: 2025-08-14
Payer: COMMERCIAL

## 2025-08-15 ENCOUNTER — APPOINTMENT (OUTPATIENT)
Dept: LAB | Facility: HOSPITAL | Age: 45
End: 2025-08-15
Payer: COMMERCIAL

## 2025-08-15 ENCOUNTER — TELEPHONE (OUTPATIENT)
Dept: ENDOCRINOLOGY | Facility: CLINIC | Age: 45
End: 2025-08-15

## 2025-08-15 ENCOUNTER — LAB REQUISITION (OUTPATIENT)
Dept: LAB | Facility: HOSPITAL | Age: 45
End: 2025-08-15
Payer: COMMERCIAL

## 2025-08-15 DIAGNOSIS — Z01.812 ENCOUNTER FOR PREPROCEDURAL LABORATORY EXAMINATION: ICD-10-CM

## 2025-08-15 DIAGNOSIS — O02.81 BIOCHEMICAL PREGNANCY (HHS-HCC): Primary | ICD-10-CM

## 2025-08-15 DIAGNOSIS — Z01.812 ENCOUNTER FOR PREPROCEDURAL LABORATORY EXAMINATION: Primary | ICD-10-CM

## 2025-08-15 LAB — B-HCG SERPL-ACNC: 34 MIU/ML

## 2025-08-15 PROCEDURE — 84702 CHORIONIC GONADOTROPIN TEST: CPT

## 2025-08-16 LAB — TSH SERPL-ACNC: 2.68 MIU/L

## 2025-08-19 PROCEDURE — 84702 CHORIONIC GONADOTROPIN TEST: CPT

## 2025-08-20 DIAGNOSIS — Z32.01 POSITIVE BLOOD PREGNANCY TEST (HHS-HCC): Primary | ICD-10-CM

## 2025-08-21 ENCOUNTER — TELEPHONE (OUTPATIENT)
Dept: ENDOCRINOLOGY | Facility: CLINIC | Age: 45
End: 2025-08-21
Payer: COMMERCIAL

## 2025-08-22 ENCOUNTER — LAB REQUISITION (OUTPATIENT)
Dept: LAB | Facility: HOSPITAL | Age: 45
End: 2025-08-22

## 2025-08-22 DIAGNOSIS — O02.81 INAPPROPRIATE CHANGE IN QUANTITATIVE HUMAN CHORIONIC GONADOTROPIN (HCG) IN EARLY PREGNANCY (HHS-HCC): ICD-10-CM

## 2025-08-22 LAB — B-HCG SERPL-ACNC: 5 MIU/ML

## 2025-08-27 ENCOUNTER — LAB REQUISITION (OUTPATIENT)
Dept: LAB | Facility: HOSPITAL | Age: 45
End: 2025-08-27
Payer: COMMERCIAL

## 2025-08-27 DIAGNOSIS — Z32.01 ENCOUNTER FOR PREGNANCY TEST, RESULT POSITIVE (HHS-HCC): ICD-10-CM

## 2025-08-27 LAB — B-HCG SERPL-ACNC: <2 MIU/ML

## 2025-08-27 PROCEDURE — 84702 CHORIONIC GONADOTROPIN TEST: CPT

## 2025-08-28 ENCOUNTER — DOCUMENTATION (OUTPATIENT)
Dept: ENDOCRINOLOGY | Facility: CLINIC | Age: 45
End: 2025-08-28
Payer: COMMERCIAL

## 2025-11-17 ENCOUNTER — APPOINTMENT (OUTPATIENT)
Dept: PRIMARY CARE | Facility: CLINIC | Age: 45
End: 2025-11-17
Payer: COMMERCIAL

## 2025-12-10 ENCOUNTER — APPOINTMENT (OUTPATIENT)
Dept: OBSTETRICS AND GYNECOLOGY | Facility: CLINIC | Age: 45
End: 2025-12-10
Payer: COMMERCIAL

## 2026-05-18 ENCOUNTER — APPOINTMENT (OUTPATIENT)
Dept: PRIMARY CARE | Facility: CLINIC | Age: 46
End: 2026-05-18
Payer: COMMERCIAL